# Patient Record
Sex: FEMALE
[De-identification: names, ages, dates, MRNs, and addresses within clinical notes are randomized per-mention and may not be internally consistent; named-entity substitution may affect disease eponyms.]

---

## 2017-04-21 ENCOUNTER — HOSPITAL ENCOUNTER (INPATIENT)
Dept: HOSPITAL 42 - ED | Age: 82
LOS: 6 days | Discharge: NURSING, REHAB OR LONG-TERM CARE | DRG: 309 | End: 2017-04-27
Attending: INTERNAL MEDICINE | Admitting: INTERNAL MEDICINE
Payer: MEDICARE

## 2017-04-21 VITALS — HEART RATE: 64 BPM

## 2017-04-21 VITALS — BODY MASS INDEX: 22.4 KG/M2

## 2017-04-21 DIAGNOSIS — J45.909: ICD-10-CM

## 2017-04-21 DIAGNOSIS — I48.0: Primary | ICD-10-CM

## 2017-04-21 DIAGNOSIS — R19.00: ICD-10-CM

## 2017-04-21 DIAGNOSIS — Z79.01: ICD-10-CM

## 2017-04-21 DIAGNOSIS — Z79.84: ICD-10-CM

## 2017-04-21 DIAGNOSIS — Z91.14: ICD-10-CM

## 2017-04-21 DIAGNOSIS — G30.9: ICD-10-CM

## 2017-04-21 DIAGNOSIS — Z86.73: ICD-10-CM

## 2017-04-21 DIAGNOSIS — Z85.3: ICD-10-CM

## 2017-04-21 DIAGNOSIS — J44.9: ICD-10-CM

## 2017-04-21 DIAGNOSIS — E86.0: ICD-10-CM

## 2017-04-21 DIAGNOSIS — I48.2: ICD-10-CM

## 2017-04-21 DIAGNOSIS — E87.6: ICD-10-CM

## 2017-04-21 DIAGNOSIS — Z95.5: ICD-10-CM

## 2017-04-21 DIAGNOSIS — E11.65: ICD-10-CM

## 2017-04-21 DIAGNOSIS — R44.1: ICD-10-CM

## 2017-04-21 DIAGNOSIS — K59.00: ICD-10-CM

## 2017-04-21 DIAGNOSIS — Z95.1: ICD-10-CM

## 2017-04-21 DIAGNOSIS — J84.9: ICD-10-CM

## 2017-04-21 DIAGNOSIS — I10: ICD-10-CM

## 2017-04-21 DIAGNOSIS — Z90.13: ICD-10-CM

## 2017-04-21 DIAGNOSIS — F02.80: ICD-10-CM

## 2017-04-21 DIAGNOSIS — I25.119: ICD-10-CM

## 2017-04-21 DIAGNOSIS — E78.00: ICD-10-CM

## 2017-04-21 DIAGNOSIS — H91.90: ICD-10-CM

## 2017-04-21 LAB
ADD MANUAL DIFF?: NO
ALBUMIN/GLOB SERPL: 1.2 {RATIO} (ref 1.1–1.8)
ALP SERPL-CCNC: 62 U/L (ref 38–133)
ALT SERPL-CCNC: 40 U/L (ref 7–56)
APPEARANCE UR: CLEAR
APTT BLD: 26.4 SECONDS (ref 23.7–30.8)
AST SERPL-CCNC: 38 U/L (ref 15–39)
BACTERIA #/AREA URNS HPF: (no result) /[HPF]
BASE EXCESS BLDV CALC-SCNC: 1.5 MMOL/L (ref 0–2)
BASOPHILS # BLD AUTO: 0.07 K/MM3 (ref 0–2)
BASOPHILS NFR BLD: 0.6 % (ref 0–3)
BILIRUB SERPL-MCNC: 1.1 MG/DL (ref 0.2–1.3)
BILIRUB UR-MCNC: NEGATIVE MG/DL
BUN SERPL-MCNC: 32 MG/DL (ref 7–21)
CALCIUM SERPL-MCNC: 9.6 MG/DL (ref 8.4–10.5)
CHLORIDE SERPL-SCNC: 103 MMOL/L (ref 98–107)
CO2 SERPL-SCNC: 25 MMOL/L (ref 21–33)
COLOR UR: YELLOW
EOSINOPHIL # BLD: 0.2 10*3/UL (ref 0–0.7)
EOSINOPHIL NFR BLD: 1.3 % (ref 1.5–5)
EPI CELLS #/AREA URNS HPF: (no result) /HPF (ref 0–5)
ERYTHROCYTE [DISTWIDTH] IN BLOOD BY AUTOMATED COUNT: 12.7 % (ref 11.5–14.5)
GLOBULIN SER-MCNC: 3.6 GM/DL
GLUCOSE SERPL-MCNC: 186 MG/DL (ref 70–110)
GLUCOSE UR STRIP-MCNC: 100 MG/DL
GRANULOCYTES # BLD: 9.1 10*3/UL (ref 1.4–6.5)
GRANULOCYTES NFR BLD: 74.3 % (ref 50–68)
HCT VFR BLD CALC: 38.8 % (ref 36–48)
INR PPP: 1 (ref 0.93–1.08)
KETONES UR STRIP-MCNC: 15 MG/DL
LEUKOCYTE ESTERASE UR-ACNC: NEGATIVE LEU/UL
LYMPHOCYTES # BLD: 1.7 10*3/UL (ref 1.2–3.4)
LYMPHOCYTES NFR BLD AUTO: 14 % (ref 22–35)
MAGNESIUM SERPL-MCNC: 1.8 MG/DL (ref 1.7–2.2)
MCH RBC QN AUTO: 31.6 PG (ref 25–35)
MCHC RBC AUTO-ENTMCNC: 35.1 G/DL (ref 31–37)
MCV RBC AUTO: 90 FL (ref 80–105)
MONOCYTES # BLD AUTO: 1.2 10*3/UL (ref 0.1–0.6)
MONOCYTES NFR BLD: 9.8 % (ref 1–6)
PH BLDV: 7.43 [PH] (ref 7.32–7.43)
PH UR STRIP: 6 [PH] (ref 4.7–8)
PHOSPHATE SERPL-MCNC: 3.2 MG/DL (ref 2.5–4.5)
PLATELET # BLD: 242 10^3/UL (ref 120–450)
PMV BLD AUTO: 10 FL (ref 7–11)
POTASSIUM SERPL-SCNC: 3.5 MMOL/L (ref 3.6–5)
PROT SERPL-MCNC: 8 G/DL (ref 5.8–8.3)
PROT UR STRIP-MCNC: 30 MG/DL
RBC # UR STRIP: (no result) /UL
RBC #/AREA URNS HPF: (no result) /HPF (ref 0–2)
SODIUM SERPL-SCNC: 142 MMOL/L (ref 132–148)
SP GR UR STRIP: 1.02 (ref 1–1.03)
T4 FREE SERPL-MCNC: 1.31 NG/DL (ref 0.78–2.19)
TROPONIN I SERPL-MCNC: 0.02 NG/ML
TSH SERPL-ACNC: 1.13 MIU/ML (ref 0.46–4.68)
UROBILINOGEN UR STRIP-ACNC: 0.2 E.U./DL
WBC # BLD AUTO: 12.3 10^3/UL (ref 4.5–11)
WBC #/AREA URNS HPF: (no result) /HPF (ref 0–6)

## 2017-04-21 RX ADMIN — ENOXAPARIN SODIUM SCH MG: 60 INJECTION SUBCUTANEOUS at 23:15

## 2017-04-21 RX ADMIN — DILTIAZEM HYDROCHLORIDE PRN MLS/HR: 100 INJECTION, POWDER, LYOPHILIZED, FOR SOLUTION INTRAVENOUS at 23:33

## 2017-04-21 NOTE — ED PDOC
Arrival/HPI





- General


Chief Complaint: Altered Mental Status


Time Seen by Provider: 04/21/17 19:09





- History of Present Illness


Narrative History of Present Illness (Text): 





04/21/17 19:38


87-year-old female presents the emergency department stating that she is seeing 

things and hearing things that are not there. Patient states that she has had 

these symptoms for the last few weeks. No relieving or exacerbating factors. 

States her hallucinations are not command, and she denies any suicidal or 

homicidal ideations. Patient is alert to name, does not know the location or 

the date.





Past Medical History





- Provider Review


Nursing Documentation Reviewed: Yes





- Infectious Disease


Hx of Infectious Diseases: None





- Tetanus Immunization


Tetanus Immunization: Unknown





- Reproductive


Menopause: Yes





- Cardiac


Hx Cardiac Disorders: Yes


Hx Hypertension: Yes





- Pulmonary


Hx Chronic Obstructive Pulmonary Disease (COPD): Yes





- Neurological


HX Cerebrovascular Accident: Yes





- HEENT


Hx HEENT Disorder: Yes ([place])





- Renal


Hx Renal Disorder: No





- Endocrine/Metabolic


Hx Diabetes Mellitus Type 2: Yes





- Hematological/Oncological


Hx Cancer: Yes (breast)





- Integumentary


Hx Dermatological Disorder: No





- Musculoskeletal/Rheumatological


Hx Falls: Yes





- Gastrointestinal


Hx Gastrointestinal Disorders: Yes (POOR APPETTITE,)





- Genitourinary/Gynecological


Hx Genitourinary Disorders: No


Hx Reproductive Disorders: No





- Psychiatric


Hx Psychophysiologic Disorder: No


Hx Substance Use: No





- Surgical History


Hx Open Heart Surgery: Yes


Other/Comment: Radical mastectomy.





- Anesthesia


Hx Anesthesia: Yes


Hx Anesthesia Reactions: No


Hx Malignant Hyperthermia: No





- Suicidal Assessment


Feels Threatened In Home Enviroment: No





Family/Social History





- Physician Review


Nursing Documentation Reviewed: Yes


Family/Social History: Unknown Family HX


Smoking Status: Never Smoked


Hx Alcohol Use: No


Hx Substance Use: No


Hx Substance Use Treatment: No





Allergies/Home Meds


Allergies/Adverse Reactions: 


Allergies





FISH Allergy (Verified 12/20/16 16:35)


 NAUSEA


Sulfa (Sulfonamide Antibiotics) Allergy (Verified 12/20/16 16:35)


 HEADACHE











Review of Systems





- Review of Systems


Systems not reviewed;Unavailable: Altered Mental Status





Physical Exam





- Physical Exam


Narrative Physical Exam (Text): 





Physical exam





Patient appears age appropriate in no distress, speaking full sentences without 

difficulty





- Systems Exam


Head: Present: Atraumatic, Normocephalic


Pupils: Present: PERRL


Extroacular Muscles: Present: EOMI


Conjunctiva: Present: Normal


Mouth: Present: Dry Mucous Membranes


Neck: Present: Normal Range of Motion.  No: MIDLINE TENDERNESS, Paraspinal 

Tenderness


Respiratory/Chest: Present: Clear to Auscultation, Good Air Exchange.  No: 

Respiratory Distress, Accessory Muscle Use, Tachypneic


Cardiovascular: Present: Irregular, Peripheal Pulses Present. 


Abdomen: Present: Normal Bowel Sounds.  No: Tenderness, Distention, Peritoneal 

Signs, Rebound, Guarding


Back: Present: Normal Inspection.  No: Midline Tenderness, Paraspinal Tenderness


Upper Extremity: Present: Normal Inspection.  No: Cyanosis, Edema


Lower Extremity: Present: Normal Inspection.  No: Edema


Neurological: Present: GCS=15, Speech Normal, cranial nerves II through XII 

fully intact with no cerebellar abnormality, neurosensory fully intact. No 

focal neurological deficits.


Skin: Present: Warm, Dry, Normal Color.  No: Rashes


Lymphatic: Present: OX3, NI, NC


Psychiatric: Present: Alert, not agitated





Vital Signs Reviewed: Yes


Vital Signs











  Temp Pulse Resp BP Pulse Ox


 


 04/21/17 21:45   122 H  20  123/91 H  97


 


 04/21/17 21:29   142 H   151/91 H  92 L


 


 04/21/17 21:24   120 H  23  136/114 H  93 L


 


 04/21/17 21:09   119 H  19  150/85  93 L


 


 04/21/17 20:57   155 H   160/107 H 


 


 04/21/17 20:55   169 H   160/107 H 


 


 04/21/17 20:39   161 H  24  136/105 H  91 L


 


 04/21/17 20:24   151 H   123/74 


 


 04/21/17 20:09   130 H   143/88 


 


 04/21/17 20:02   130 H   139/63 


 


 04/21/17 19:57   172 H   203/127 H 


 


 04/21/17 19:55   166 H   197/117 H 


 


 04/21/17 19:04  98.3 F  115 H  20  186/109 H  95











Temperature: Afebrile


Blood Pressure: Hypertensive


Pulse: Tachycardic


Respiratory Rate: Normal


Appearance: Positive for: Non-Toxic, Comfortable


Pain Distress: None


Mental Status: Positive for: Confused.  No: Agitated, Lethargic





Medical Decision Making


ED Course and Treatment: 


04/21/17 19:13


Previous records reviewed, patient was hospitalized 12/20/16 with altered 

mental status. Patient has a history of atrial fibrillation on Coumadin, 

hypertension, diabetes, breast cancer, dementia.





04/21/17 19:40


Elderly female in the emergency department with hallucinations. No focal 

neurological deficits on examination. Patient is alert to name only.





Labs, imaging ordered. 





Differential diagnoses includes but not limited to: Worsening dementia versus 

infection versus UTI versus pneumonia





04/21/17 20:31


CXR shows vasc. congestion with L. lobar infiltrate. Cardiomegaly. Interpreted 

by me. 





04/21/17 20:40


dw Dr. Lakhani, recommends lopressor first, and states will send resident to 

evaluate





04/21/17 21:37


accepted by Dr. Lakhani to the MICU


Dr. Muller paged





04/21/17 22:03


dw Dr. Muller, accepted pt to her service





- Critical Care


Critical Care Minutes: 30 minutes


Narrative Critical Care (Text): 





04/21/17 20:00


Patient in rapid A. fib, also hypertensive, Cardizem IV push ordered





- Lab Interpretations


Lab Results: 








 04/21/17 19:30 





 04/21/17 19:30 





 Lab Results





04/21/17 21:02: Urine Color Yellow, Urine Appearance Clear, Urine pH 6.0, Ur 

Specific Gravity 1.020, Urine Protein 30 H, Urine Glucose (UA) 100 H, Urine 

Ketones 15 H, Urine Blood Trace-intact H, Urine Nitrate Negative, Urine 

Bilirubin Negative, Urine Urobilinogen 0.2, Ur Leukocyte Esterase Negative, 

Urine RBC 0 - 2, Urine WBC 0 - 2, Ur Epithelial Cells 0 - 2, Urine Bacteria Few


04/21/17 19:30: WBC 12.3 H D, RBC 4.31, Hgb 13.6, Hct 38.8, MCV 90.0, MCH 31.6, 

MCHC 35.1, RDW 12.7, Plt Count 242, MPV 10.0, Gran % 74.3 H, Lymph % (Auto) 

14.0 L, Mono % (Auto) 9.8 H, Eos % (Auto) 1.3 L, Baso % (Auto) 0.6, Gran # 9.10 

H, Lymph # 1.7, Mono # 1.2 H, Eos # 0.2, Baso # 0.07, PT 10.8, INR 1.00, APTT 

26.4, pO2 115 H, VBG pH 7.43, VBG pCO2 39.0 L, VBG HCO3 25.9, VBG Total CO2 27.1

, VBG O2 Sat (Calc) 99.5 H, VBG Base Excess 1.5, VBG Potassium 5.3 H, Glucose 

196 H, Lactate 1.3, FiO2 21.0, Sodium 137.0, Potassium 3.5 L, Chloride 107.0, 

Carbon Dioxide 25, Anion Gap 18, BUN 32 H, Creatinine 0.6, Est GFR (African Amer

) > 60, Est GFR (Non-Af Amer) > 60, Random Glucose 186 H, Calcium 9.6, 

Phosphorus 3.2, Magnesium 1.8, Total Bilirubin 1.1, AST 38, ALT 40, Alkaline 

Phosphatase 62, Lactate Dehydrogenase 789 H, Total Creatine Kinase 176, 

Troponin I 0.02  D, NT-Pro-B Natriuret Pep 997 H, Total Protein 8.0, Albumin 4.3

, Globulin 3.6, Albumin/Globulin Ratio 1.2, Free T4 1.31, TSH 3rd Generation 

1.13, Venous Blood Potassium 5.3 H











- RAD Interpretation


Radiology Orders: 








04/21/17 19:28


HEAD W/O CONTRAST [CT] Stat 


CHEST PORTABLE [RAD] Stat 














- Medication Orders


Current Medication Orders: 








Enoxaparin Sodium (Lovenox)  60 mg SC Q12H JOSHUA


   PRN Reason: Protocol


   Last Admin: 04/21/17 23:15  Dose: 60 MG





Subcutaneous Administrations


 Document     04/21/17 23:15  YP  (Rec: 04/21/17 23:15  YP  [id number])


     Injection Site


      MAR Injection Site                         Right Abdomen


     Charges for Administration


      # of Subcutaneous Administrations          1





diltiaZEM IVPB 100mg in NS (Cardizem 100mg In Ns)  100 mls @ 5 mls/hr IV .Q20H 

PRN; Protocol; 5 MG/HR


   PRN Reason: TITRATE PER MD ORDER


   Last Admin: 04/21/17 23:33  Dose: 5 MLS/HR





Titration Intervention


 Document     04/21/17 23:33  MKN  (Rec: 04/21/17 23:34  KATHY  [id number])


     Titration Intake


      Container Volume                           100


     Titration Dosing


      Titration Dose                             5


      IV Rate                                    5


      Intake/Decrease                            Start


eMAR Start Stop


 Document     04/21/17 23:33  MKN  (Rec: 04/21/17 23:34  [facility]  [id number])


     Intravenous Solution


      Start Date                                 04/21/17


      Start Time                                 23:34





Sodium Chloride (Sodium Chloride 0.9%)  1,000 mls @ 100 mls/hr IV .Q10H JOSHUA


   Last Admin: 04/21/17 23:16  Dose: 100 MLS/HR





eMAR Start Stop


 Document     04/21/17 23:16  YP  (Rec: 04/21/17 23:16  YP  [id number])


     Intravenous Solution


      Start Date                                 04/21/17


      Start Time                                 23:16





Pantoprazole Sodium (Protonix Ec Tab)  40 mg PO 0630 JOSHUA


   Last Admin: 04/22/17 06:41  Dose: 40 MG





Warfarin Sodium (Coumadin)  3 mg PO 1800 JOSHUA


   PRN Reason: Protocol





Discontinued Medications





Diltiazem HCl (Cardizem) Confirm Administered Dose 25 mg .ROUTE .STK-MED ONE


   Stop: 04/21/17 19:57


   Last Admin: 04/21/17 20:00  Dose:  





Diltiazem HCl (Cardizem)  20 mg IVP STAT STA


   Stop: 04/21/17 20:00


   Last Admin: 04/21/17 19:57  Dose: 20 MG





MAR Pulse and Blood Pressure


 Document     04/21/17 19:57  YP  (Rec: 04/21/17 20:01  YP  [id number])


     Pulse


      Pulse Rate (60-90)                         172


     Blood Pressure


      Blood Pressure (100//90)             203/127


IVP Administration


 Document     04/21/17 19:57  YP  (Rec: 04/21/17 20:01  YP  [id number])


     Charges for Administration


      # of IVP Administrations                   1





Furosemide (Lasix)  40 mg IVP STAT STA


   Stop: 04/21/17 20:32


   Last Admin: 04/21/17 20:57  Dose: 40 MG





MAR Blood Pressure


 Document     04/21/17 20:57  YP  (Rec: 04/21/17 20:57  YP  [id number])


     Blood Pressure


      Blood Pressure (100//90)             160/107


IVP Administration


 Document     04/21/17 20:57  YP  (Rec: 04/21/17 20:57  YP  [id number])


     Charges for Administration


      # of IVP Administrations                   1





Magnesium Sulfate/Dextrose (Magnesium Sulfate 1 Gm/100 Ml D5w)  100 mls @ 100 

mls/hr IVPB ONCE ONE


   Stop: 04/21/17 21:27


   Last Admin: 04/21/17 20:58  Dose: 100 MLS/HR





eMAR Start Stop


 Document     04/21/17 20:58  YP  (Rec: 04/21/17 20:58  YP  [id number])


     Intravenous Solution


      Start Date                                 04/21/17


      Start Time                                 20:58


      End Date                                   04/21/17


      End time                                   21:58


      Total Infusion Time                        60





Azithromycin (Zithromax 500mg In Ns)  250 mls @ 167 mls/hr IVPB STAT STA


   PRN Reason: Protocol


   Stop: 04/21/17 22:00


   Last Admin: 04/21/17 21:39  Dose: 167 MLS/HR





eMAR Start Stop


 Document     04/21/17 21:39  YP  (Rec: 04/21/17 21:39  YP  [id number])


     Intravenous Solution


      Start Date                                 04/21/17


      Start Time                                 21:39


      End Date                                   04/21/17


      End time                                   23:09


      Total Infusion Time                        90





Ceftriaxone Sodium (Rocephin 1 Gram Ivpb)  100 mls @ 200 mls/hr IV STAT STA


   PRN Reason: Protocol


   Stop: 04/21/17 21:00


   Last Admin: 04/21/17 20:57  Dose: 200 MLS/HR





eMAR Start Stop


 Document     04/21/17 20:57  YP  (Rec: 04/21/17 20:57  YP  [id number])


     Intravenous Solution


      Start Date                                 04/21/17


      Start Time                                 20:57


      End Date                                   04/21/17


      End time                                   21:27


      Total Infusion Time                        30





Potassium Chloride (Potassium Chloride 20 Meq/100 Ml)  100 mls @ 50 mls/hr IVPB 

ONCE ONE


   Stop: 04/21/17 22:42


   Last Admin: 04/21/17 20:57  Dose: 50 MLS/HR





eMAR Start Stop


 Document     04/21/17 20:57  YP  (Rec: 04/21/17 20:58  YP  [id number])


     Intravenous Solution


      Start Date                                 04/21/17


      Start Time                                 20:57


      End Date                                   04/21/17


      End time                                   22:57


      Total Infusion Time                        120





Metoprolol Tartrate (Lopressor)  5 mg IVP STAT STA


   Stop: 04/21/17 20:41


   Last Admin: 04/21/17 20:57  Dose: 5 MG





MAR Pulse and Blood Pressure


 Document     04/21/17 20:57  YP  (Rec: 04/21/17 20:57  YP  [id number])


     Pulse


      Pulse Rate (60-90)                         155


     Blood Pressure


      Blood Pressure (100//90)             160/107


IVP Administration


 Document     04/21/17 20:57  YP  (Rec: 04/21/17 20:57  YP  [id number])


     Charges for Administration


      # of IVP Administrations                   1














Disposition/Present on Arrival





- Present on Arrival


Any Indicators Present on Arrival: No


History of DVT/PE: No


History of Uncontrolled Diabetes: Yes


Urinary Catheter: No


History of Decub. Ulcer: No


History Surgical Site Infection Following: None





- Disposition


Have Diagnosis and Disposition been Completed?: Yes


Diagnosis: 


 Altered mental status


Disposition: HOSPITALIZED


Disposition Time: 21:38


Patient Plan: Admission


Patient Problems: 


 Current Active Problems











Problem Status Diagnosed


 


Altered mental status Acute 











Condition: FAIR

## 2017-04-21 NOTE — CP.PCM.CON
<Rudolph Wadsworth - Last Filed: 17 22:44>





History of Present Illness





- History of Present Illness


History of Present Illness: 


Rudolph Wadswroth, PGY-1 Consult Note for ICU Service





87 F with PMHx of HTN, NIDDM, COPD, CAD s/p CABG, Afib on coumadin, breast 

cancer s/p mastectomy and chemotherapy and dementia [place] [facility] [place] 

complaints of AMS. Pt was brought [place] [facility] [place] EMS, who reported that she was 

hallucinating in her apartment. As per EMS, pt was experiencing auditory and 

visual hallucinations, as she was seeing her dead dog and hearing laughter. Pt 

denied homicidal or suicidal ideation. She states that she is currently feeling 

weak, and has insight into the fact that she is confused. She is alert, 

orientated to person and more recently to place, is able to follows commands, 

however is easily distracted. Pt stated that she feels she is on a tv show, 

everyone is looking at her and laughing. ROS unable to be obtained on account 

of pts AMS. 





PMHx: HTN, NIDDM, COPD, CAD s/p CABG, Afib on coumadin, breast cancer and 

dementia


PSHx: Double mastectomy, CABG, "valve replacement"


SHx: Denied tobacco/etoh/illicit drug use. Live in [facility]


Famhx: Noncontributory


Meds: metformin, coumadin, sotolol, seroquel, losartan, glipizide, lipitor


Allergies: Fish, Sulfa





Review of Systems





- Review of Systems


Systems not reviewed;Unavailable: Altered Mental Status





Past Patient History





- Infectious Disease


Hx of Infectious Diseases: None





- Tetanus Immunizations


Tetanus Immunization: Unknown





- Past Social History


Smoking Status: Never Smoked





- CARDIAC


Hx Cardiac Disorders: Yes


Hx Hypertension: Yes





- PULMONARY


Hx Chronic Obstructive Pulmonary Disease (COPD): Yes





- NEUROLOGICAL


HX Cerebrovascular Accident: Yes





- HEENT


Hx HEENT Problems: Yes ([place])





- RENAL


Hx Chronic Kidney Disease: No





- ENDOCRINE/METABOLIC


Hx Diabetes Mellitus Type 2: Yes





- HEMATOLOGICAL/ONCOLOGICAL


Hx Cancer: Yes (breast)





- INTEGUMENTARY


Hx Dermatological Problems: No





- MUSCULOSKELETAL/RHEUMATOLOGICAL


Hx Falls: Yes





- GASTROINTESTINAL


Hx Gastrointestinal Disorders: Yes (POOR APPETTITE,)





- GENITOURINARY/GYNECOLOGICAL


Hx Genitourinary Disorders: No


Hx Reproductive Disorders: No





- PSYCHIATRIC


Hx Psychophysiologic Disorder: No


Hx Substance Use: No





- SURGICAL HISTORY


Hx Open Heart Surgery: Yes


Other/Comment: Radical mastectomy.





- ANESTHESIA


Hx Anesthesia: Yes


Hx Anesthesia Reactions: No


Hx Malignant Hyperthermia: No





Meds


Allergies/Adverse Reactions: 


 Allergies











Allergy/AdvReac Type Severity Reaction Status Date / Time


 


FISH Allergy  NAUSEA Verified 16 16:35


 


Sulfa (Sulfonamide Allergy  HEADACHE Verified 16 16:35





Antibiotics)     














- Medications


Medications: 


 Current Medications





Enoxaparin Sodium (Lovenox)  60 mg SC Q12H JOSHUA


   PRN Reason: Protocol


Potassium Chloride (Potassium Chloride 20 Meq/100 Ml)  100 mls @ 50 mls/hr IVPB 

ONCE ONE


   Stop: 17 22:42


   Last Admin: 17 20:57 Dose:  50 mls/hr


diltiaZEM IVPB 100mg in NS (Cardizem 100mg In Ns)  100 mls @ 5 mls/hr IV .Q20H 

PRN; Protocol; 5 MG/HR


   PRN Reason: TITRATE PER MD ORDER


Sodium Chloride (Sodium Chloride 0.9%)  1,000 mls @ 100 mls/hr IV .Q10H JOSHUA


Pantoprazole Sodium (Protonix Ec Tab)  40 mg PO 0630 JOSHUA


Warfarin Sodium (Coumadin)  3 mg PO 1800 JOSHUA


   PRN Reason: Protocol











Physical Exam





- Constitutional


Appears: No Acute Distress, Confused





- Head Exam


Head Exam: ATRAUMATIC, NORMAL INSPECTION, NORMOCEPHALIC





- Eye Exam


Eye Exam: EOMI, Normal appearance, PERRL


Pupil Exam: NORMAL ACCOMODATION, PERRL





- ENT Exam


ENT Exam: Mucous Membranes Dry





- Neck Exam


Neck exam: Positive for: Normal Inspection





- Respiratory Exam


Respiratory Exam: Clear to Auscultation Bilateral, NORMAL BREATHING PATTERN





- Cardiovascular Exam


Cardiovascular Exam: Tachycardia, Irregular Rhythm, +S1, +S2





- GI/Abdominal Exam


GI & Abdominal Exam: Distended (mildly), Normal Bowel Sounds, Soft.  absent: 

Tenderness





- Extremities Exam


Extremities exam: Positive for: normal inspection, pedal pulses present.  

Negative for: pedal edema, tenderness





- Back Exam


Back exam: NORMAL INSPECTION





- Neurological Exam


Neurological exam: Alert, Altered, CN II-XII Intact





- Psychiatric Exam


Psychiatric exam: Normal Affect, Normal Mood





- Skin


Skin Exam: Dry, Intact, Normal Color, Warm


Additional comments: 


chest scars well healed





Results





- Vital Signs


Recent Vital Signs: 


 Last Vital Signs











Temp  98.3 F   17 19:04


 


Pulse  142 H  17 21:29


 


Resp  23   17 21:24


 


BP  151/91 H  17 21:29


 


Pulse Ox  92 L  17 21:29














- Labs


Result Diagrams: 


 17 19:30





 17 19:30





Assessment & Plan





- Assessment and Plan (Free Text)


Assessment: 


87 F with PMHx of HTN, NIDDM, COPD, CAD s/p CABG, Afib on coumadin, breast 

cancer and dementia [place] [facility] [place] with complaints of AMS secondary to an 

unclear etiology, admitted to ICU on account of HD instability. 





Neuro:


- AAOx2, person and place, speaking in full sentences and following commands


- Hx of isolated Mood disorder and psychosis


- Experiencing Auditory and Visual hallucinations


- Neurocheck q4h to monitor for improvement of altered status


- CTH pending





Pulm:


- Hx of COPD


-  Sat 95% on RA


- CXR: Questionable LLL PNA; pt has residual breast tissue s/p mastectomy


- Administered Empiric Abx: Rocephin and Azithromax, will hold for now


- 02 NC PRN to maintain 02 sat >88%





CVS:


- Pt is dehydrated, as demonstrated by labwork, will begin IVF, NS@100ml/hr


- Hx of Afib on coumadin


- currently in Afib with RVR, will begin Cardizem drip and titrate to targer HR 

<130bpm


- Target MAP >70 





GI:


- Mildly distended abdomen


- F/u CT Abd/pelv 


- Protonix GI ppx


- NPO





Renal:


- Monitor I&Os


- Monitor renal fcn


- Hypokalemic, supplemented


- will continue to monitor electrolytes and supplement as needed





ID:


- Leukocytosis, empiric abx for questionable pna


- Fu Bcx and Ua


- Lactate 1.3


- Tylenol prn for fever





Heme:


- Leukocytosis with WBC of 12.3, Azithromycin and Rocephin empirically 

administered


- Subtherapeutic INR: 1.00, will give therapeutic Lovenox 60mg q12 in addition 

to home coumadin 3mg 1800


- Fu INR





Seen reviewed and discussed with attending

















<Pool Lakhani Q - Last Filed: 17 04:37>





Meds





- Medications


Medications: 


 Current Medications





Enoxaparin Sodium (Lovenox)  60 mg SC Q12H JOSHUA


   PRN Reason: Protocol


   Last Admin: 17 23:15 Dose:  60 mg


diltiaZEM IVPB 100mg in NS (Cardizem 100mg In Ns)  100 mls @ 5 mls/hr IV .Q20H 

PRN; Protocol; 5 MG/HR


   PRN Reason: TITRATE PER MD ORDER


   Last Admin: 17 23:33 Dose:  5 mls/hr


Sodium Chloride (Sodium Chloride 0.9%)  1,000 mls @ 100 mls/hr IV .Q10H JOSHUA


   Last Admin: 17 23:16 Dose:  100 mls/hr


Pantoprazole Sodium (Protonix Ec Tab)  40 mg PO 0630 JOSHUA


Warfarin Sodium (Coumadin)  3 mg PO 1800 JOSHUA


   PRN Reason: Protocol











Results





- Vital Signs


Recent Vital Signs: 


 Last Vital Signs











Temp  97.1 F L  17 23:46


 


Pulse  97 H  17 00:00


 


Resp  39 H  17 00:00


 


BP  133/76   17 00:00


 


Pulse Ox  98   17 00:00














- Labs


Result Diagrams: 


 17 19:30





 17 19:30





Attending/Attestation





- Attestation


I have personally seen and examined this patient.: Yes


I have fully participated in the care of the patient.: Yes


I have reviewed all pertinent clinical information: Yes


Notes (Text): 


17 04:33


I agree with the above mentioned note and exam by Dr. Wadsworth with the addition/

exception of the followin y/o female with a PMHx as listed above was brought in to the ED by EMS due 

to an apparent "altered mental status."  There was not much information 

available in the ED regarding what prompted the phone call or who called and 

why regarding the patient's change in mental condition; attempts made to reach 

the phone numbers in the chart without success.  The patient herself happens to 

be AAOx3 however does speak as if she is having visual hallucinations seeing 

people and objects that are not present.  She also had Afib with RVR in the ED 

without a clear cut cause as to what caused this.  She was admitted to the ICU 

given that a bolus dose of cardizem did not alleviate her tachycardia; she was 

placed on a drip and rate controlled. Head CT was done which appears to show no 

acute process at this time; CT of the Abd/pelvis showed a carcnoid mass in the 

stomach.  We will have a better picture as to what the patient's baseline is 

later today when she is seen and examined by her PMD.





Case discussed at length with Dr. Thacker in the ED


labs and images reviewed personally





Total time of care: 40 minutes

## 2017-04-22 LAB
ADD MANUAL DIFF?: NO
ALBUMIN/GLOB SERPL: 1.1 {RATIO} (ref 1.1–1.8)
ALP SERPL-CCNC: 65 U/L (ref 38–133)
ALT SERPL-CCNC: 35 U/L (ref 7–56)
AST SERPL-CCNC: 37 U/L (ref 15–39)
BASOPHILS # BLD AUTO: 0.06 K/MM3 (ref 0–2)
BASOPHILS NFR BLD: 0.5 % (ref 0–3)
BILIRUB SERPL-MCNC: 1.3 MG/DL (ref 0.2–1.3)
BUN SERPL-MCNC: 23 MG/DL (ref 7–21)
CALCIUM SERPL-MCNC: 8.6 MG/DL (ref 8.4–10.5)
CHLORIDE SERPL-SCNC: 102 MMOL/L (ref 98–107)
CO2 SERPL-SCNC: 26 MMOL/L (ref 21–33)
EOSINOPHIL # BLD: 0.1 10*3/UL (ref 0–0.7)
EOSINOPHIL NFR BLD: 0.8 % (ref 1.5–5)
ERYTHROCYTE [DISTWIDTH] IN BLOOD BY AUTOMATED COUNT: 12.9 % (ref 11.5–14.5)
GLOBULIN SER-MCNC: 3.7 GM/DL
GLUCOSE SERPL-MCNC: 147 MG/DL (ref 70–110)
GRANULOCYTES # BLD: 8.13 10*3/UL (ref 1.4–6.5)
GRANULOCYTES NFR BLD: 71.9 % (ref 50–68)
HCT VFR BLD CALC: 40.1 % (ref 36–48)
INR PPP: 1.07 (ref 0.93–1.08)
LABORATORY COMMENT REPORT: 3.4 PG/ML (ref 2.77–5.27)
LYMPHOCYTES # BLD: 1.8 10*3/UL (ref 1.2–3.4)
LYMPHOCYTES NFR BLD AUTO: 16.2 % (ref 22–35)
MAGNESIUM SERPL-MCNC: 1.8 MG/DL (ref 1.7–2.2)
MCH RBC QN AUTO: 30.8 PG (ref 25–35)
MCHC RBC AUTO-ENTMCNC: 34.4 G/DL (ref 31–37)
MCV RBC AUTO: 89.5 FL (ref 80–105)
MONOCYTES # BLD AUTO: 1.2 10*3/UL (ref 0.1–0.6)
MONOCYTES NFR BLD: 10.6 % (ref 1–6)
PHOSPHATE SERPL-MCNC: 3.5 MG/DL (ref 2.5–4.5)
PLATELET # BLD: 256 10^3/UL (ref 120–450)
PMV BLD AUTO: 10.1 FL (ref 7–11)
POTASSIUM SERPL-SCNC: 3.7 MMOL/L (ref 3.6–5)
PROT SERPL-MCNC: 8 G/DL (ref 5.8–8.3)
SODIUM SERPL-SCNC: 142 MMOL/L (ref 132–148)
WBC # BLD AUTO: 11.3 10^3/UL (ref 4.5–11)

## 2017-04-22 RX ADMIN — DILTIAZEM HYDROCHLORIDE PRN MLS/HR: 100 INJECTION, POWDER, LYOPHILIZED, FOR SOLUTION INTRAVENOUS at 17:16

## 2017-04-22 RX ADMIN — ENOXAPARIN SODIUM SCH MG: 60 INJECTION SUBCUTANEOUS at 10:57

## 2017-04-22 RX ADMIN — PANTOPRAZOLE SODIUM SCH MG: 40 TABLET, DELAYED RELEASE ORAL at 06:41

## 2017-04-22 RX ADMIN — ENOXAPARIN SODIUM SCH MG: 60 INJECTION SUBCUTANEOUS at 22:15

## 2017-04-22 NOTE — CON
DATE: 04/22/2017



PULMONARY CRITICAL CARE CONSULTATION 



I am also covering Dr. Muller for today.



REASON FOR CONSULT:  Change of mental status, chronic lung disease.



HISTORY OF PRESENT ILLNESS:  This is an 87-year-old female with known history of hypertension, diabet
es, paroxysmal atrial fibrillation, dementia, chronic lung disease, found to be lethargic.  In the ER
 had AFib with rapid ventricular response.  Was admitted to intensive care unit.  Her atrial fibrilla
tion converted to sinus rhythm spontaneously.  She was started on antibiotics, fluids, and feels bett
er this morning, more awake.  No significant cough or sputum production.  No chest pain.  No hematuri
a, no diarrhea.



PAST MEDICAL HISTORY:  Chronic lung disease, hypertension, diabetes, coronary artery disease, paroxys
mal atrial fibrillation, history of breast cancer requiring mastectomy, Alzheimer-type dementia.



ALLERGIES:  SULFA.



SOCIAL HISTORY:  No history of smoking or alcohol use.



FAMILY HISTORY:  No significant cardiopulmonary disease reported.



MEDICATIONS:  She is on presently diltiazem IV drip, Coumadin 3 mg will be given tonight, Cozaar 100 
mg daily, metoprolol tartrate 25 mg twice a day, Lovenox 60 mg subcutaneous twice a day, Protonix 40 
mg daily, IV fluid normal saline 100 mL per hour.



PHYSICAL EXAMINATION:  

HEENT:  Moist mucous membranes.  Crowded.

NECK:  Supple.  No JVD.

LUNGS:  Have a few scattered rhonchi.

HEART:  S1 and S2.

ABDOMEN:  Soft, nontender.  No organomegaly.

EXTREMITIES:  There is no edema.

NEUROLOGIC:  Awake, alert, follows simple commands, but confused.



LABORATORY DATA:  Shows hemoglobin 13.8, hematocrit 40.1, WBC 11.3, platelet is 256.  INR 1.07.  Had 
a VBG done yesterday, shows pH 7.43, pCO2 39, O2 115.  Sodium 142, potassium 3.7, chloride 102, bicar
bonate 26, BUN 23, creatinine 0.6, glucose 147, calcium 8.6, phosphorus 3.5, magnesium 1.8, AST 37, A
LT 35, alk phos is 65.  ProBNP 2180.  Albumin is 4.2.  TSH is 1.13.  Urinalysis shows WBCs 0-2, RBCs 
0-2.  Had a CAT scan of the abdomen and pelvis done on admission, which shows a 4 cm calcified mass i
n the central mesenteric root, which is nonspecific.  Carcinoid tumor cannot be excluded.  CAT scan o
f the head was done, which shows motion artifact, but no sign of a bleed or stroke observed.  Chest x
-ray done on admission shows chronic interstitial changes.



IMPRESSION AND PLAN:  Atrial fibrillation with rapid ventricular response with change of mental statu
s, chronic lung disease, interstitial lung disease, abdominal mass, Alzheimer-type dementia, hyperten
mireya.  I agree with the present management.  Continue anticoagulation.  Supplement oxygen.  Continue 
IV Cardizem for now.  We will get gastroenterology consult to further evaluate the abdominal mass.  F
ollow up ProBNP, procalcitonin.  Continue gastric prophylaxis, anticoagulation.





__________________________________________

Claire Barcenas MD







cc:



DD: 04/22/2017 16:05:48  [id number]

TT: 04/22/2017 17:05:40

Confirmation # [id number]

Dictation # [id number]

aniya

## 2017-04-22 NOTE — CON
DATE: 04/22/2017



HISTORY OF PRESENT ILLNESS:  This is an 87-year-old lady with history of 
dementia, hypertension, diabetes mellitus, and paroxysmal atrial fibrillation, 
who presented this time with altered mental status/lethargy.  She had mild 
leukocytosis and concern for community-acquired pneumonia was raised by ER 
physician.  The patient was started on ceftriaxone and azithromycin.  IV fluids 
were initiated.  The patient was also noted to have uncontrolled afib with RVR, 
thus she was put on Cardizem drip with subsequent spontaneous conversion to 
sinus rhythm.  The patient was admitted to ICU with prelim diagnosis of severe 
sepsis, dehydration, and questionable ability to protect airways.  No nausea, 
no vomiting, no diarrhea, no constipation.



PAST MEDICAL HISTORY:  Diabetes, hypertension, COPD, coronary artery disease, 
paroxysmal afib, history of breast cancer with mastectomy, dementia.



ALLERGIES:  SULFA DRUGS FISH.



FAMILY HISTORY:  Noncontributory.



SOCIAL HISTORY:  No alcohol or illicit drug abuse.  No tobacco smoking.



REVIEW OF SYSTEMS:  Revealed 12 organ system other than mentioned in history of 
present illness is negative.



MEDICATIONS AT HOME:  Metformin, warfarin, sotalol, Seroquel, Cozaar, glipizide
, bacitracin, Lipitor.



PHYSICAL EXAMINATION:

VITAL SIGNS:  Heart rate 88, blood pressure is 191/119 (patient is on Cozaar, 
which was held yesterday and will be restarted today), Cardizem drip at 5 mg 
per hour, respiratory rate 20, oxygen saturation 99% on nasal cannula 2 liters 
per minute.

HEAD AND NECK:  Atraumatic.

LUNGS:  Clear to auscultation bilaterally.

HEART:  Regular rate and rhythm.  S1, S2 normal.

ABDOMEN:  Soft, nontender, nondistended.

MUSCULOSKELETAL:  No C/C/E.

NEUROLOGIC:  The patient moves all extremities spontaneously.

SKIN:  Moist.

PSYCHIATRIC:  The patient is alert, confused, but not lethargic and clearly 
able to protect her airways.



LABORATORY DATA:  WBC 11.3, hemoglobin 13.8, platelet count 256.  Sodium 142, 
potassium 3.7, chloride 102, carbon dioxide 26, BUN 23, creatinine 0.6, glucose 
147.  Troponin 0.02.  INR 1.07.  Lactic acid 1.3.  Urine showed no nitrites and 
no leukocyte esterase. BUN 23, creatinine 0.6.



MEDICATIONS IN THE HOSPITAL:  Cardizem drip 5 per hour, warfarin, Cozaar, 
metoprolol 25 mg p.o. b.i.d., Lovenox 60 mg subQ q. 12, Protonix 40 mg p.o., 
ceftriaxone, azithromycin given yesterday.



Chest x-ray:  No active pulmonary disease.



ASSESSMENT AND PLAN:  This is an 87-year-old lady who presented with some 
degree of dehydration that led to atrial fibrillation with rapid ventricular 
rate and some altered mental status.  The patient was fluid resuscitated and 
started on Cardizem drip with subsequent spontaneous conversion to sinus 
rhythm.  There are no signs of potential sources of infection.  Urinalysis is 
negative for urine nitrites and leukocyte esterase, chest x-ray did not show 
any distinct infiltrate.  The patient is afebrile and has only minimal 
leukocytosis at 11.3, which likely reactive.  The patient is comfortable, able 
to protect her airways, slightly hypertensive, but otherwise hemodynamically 
relatively stable.  Her oral medication will be restarted.  Okay to downgrade 
to telemetry.  We will continue to target euvolemia, euglycemia, normothermia 
and oxygen saturation more than 90%.  We will continue IV fluid until oral 
hydration and nutrition deemed to be adequate.  We will continue with GI 
prophylaxis.  The patient on therapeutic anticoagulation to prevent stroke, 
proximal atrial fibrillation. Cardiology consult is pending



ccm time 40 min





__________________________________________

Chinmay Calixto MD







cc:   



DD: 04/22/2017 08:00:33  [id number]

TT: 04/22/2017 08:55:08

Confirmation # [id number]

Dictation # [id number]

jn

MTDD

## 2017-04-22 NOTE — CT
PROCEDURE:  CT HEAD WITHOUT CONTRAST.



HISTORY:

HA x2 weeks



COMPARISON:

None available. 



TECHNIQUE:

Axial computed tomography images were obtained through the head/brain 

without intravenous contrast.  



Radiation dose:



Total exam DLP =  mGy-cm.



This CT exam was performed using one or more of the following dose 

reduction techniques: Automated exposure control, adjustment of the 

mA and/or kV according to patient size, and/or use of iterative 

reconstruction technique.



FINDINGS:



HEMORRHAGE:

No intracranial hemorrhage. 



BRAIN:

No mass effect or edema.  No atrophy or chronic microvascular 

ischemic changes.



VENTRICLES:

Unremarkable. No hydrocephalus. 



CALVARIUM:

Unremarkable.



PARANASAL SINUSES:

Unremarkable as visualized. No significant inflammatory changes.



MASTOID AIR CELLS:

Unremarkable as visualized. No inflammatory changes.



OTHER FINDINGS:

None.



IMPRESSION:

Limited by motion artifact. No acute intracranial hemorrhage.

## 2017-04-22 NOTE — CT
PROCEDURE:  CT Abdomen and Pelvis without intravenous contrast



HISTORY:

distended



COMPARISON:

None.



TECHNIQUE:

Technique. 



Contrast Dose: 



Radiation dose:



Total exam DLP =  mGy-cm.



This CT exam was performed using one or more of the following dose 

reduction techniques: Automated exposure control, adjustment of the 

mA and/or kV according to patient size, and/or use of iterative 

reconstruction technique.



FINDINGS:



LOWER THORAX:

Unremarkable. 



LIVER:

Unremarkable. No gross lesion or ductal dilatation.  



GALLBLADDER AND BILE DUCTS:

Gallstones. 



PANCREAS:

Unremarkable. No gross lesion or ductal dilatation.



SPLEEN:

Unremarkable. 



ADRENALS:

Unremarkable. No mass. 



KIDNEYS AND URETERS:

Unremarkable. No hydronephrosis. No solid mass. 



VASCULATURE:

Unremarkable. No aortic aneurysm. 



BOWEL:

Colonic diverticulosis. 



APPENDIX:

Unremarkable. Normal appendix. 



PERITONEUM:

4 centimeter calcified mass in the central mesenteric root. 



LYMPH NODES:

Unremarkable. No enlarged lymph nodes. 



BLADDER:

Unremarkable. 



REPRODUCTIVE:

Unremarkable. 



BONES:

No acute fracture. 



OTHER FINDINGS:

None.



IMPRESSION:

4 centimeter calcified mass in the central mesenteric root which is 

nonspecific. Carcinoid tumor is not excluded.



Cholelithiasis.

## 2017-04-22 NOTE — RAD
HISTORY:

 cough 



COMPARISON:

No prior. 



FINDINGS:



LUNGS:

Chronic interstitial changes.



PLEURA:

No significant pleural effusion identified, no pneumothorax apparent.



CARDIOVASCULAR:

Normal. Status post CABG. 



OSSEOUS STRUCTURES:

No significant abnormalities.



VISUALIZED UPPER ABDOMEN:

Normal.



OTHER FINDINGS:

None.



IMPRESSION:

Chronic interstitial changes. Status post CABG.

## 2017-04-22 NOTE — CARD
--------------- APPROVED REPORT --------------





EKG Measurement

Heart [id number]

[id number]

[id number]

[id number]



<Conclusion>

Atrial fibrillation with rapid ventricular response

Left axis deviation

Moderate voltage criteria for LVH, may be normal variant

ST depression, consider subendocardial injury or digitalis effect

Abnormal ECG

## 2017-04-23 LAB
ADD MANUAL DIFF?: NO
ALBUMIN/GLOB SERPL: 1 {RATIO} (ref 1.1–1.8)
ALP SERPL-CCNC: 51 U/L (ref 38–133)
ALT SERPL-CCNC: 33 U/L (ref 7–56)
AST SERPL-CCNC: 31 U/L (ref 15–39)
BASOPHILS # BLD AUTO: 0.07 K/MM3 (ref 0–2)
BASOPHILS NFR BLD: 0.7 % (ref 0–3)
BILIRUB SERPL-MCNC: 0.8 MG/DL (ref 0.2–1.3)
BUN SERPL-MCNC: 27 MG/DL (ref 7–21)
CALCIUM SERPL-MCNC: 8.6 MG/DL (ref 8.4–10.5)
CHLORIDE SERPL-SCNC: 105 MMOL/L (ref 98–107)
CO2 SERPL-SCNC: 28 MMOL/L (ref 21–33)
EOSINOPHIL # BLD: 0.3 10*3/UL (ref 0–0.7)
EOSINOPHIL NFR BLD: 3 % (ref 1.5–5)
ERYTHROCYTE [DISTWIDTH] IN BLOOD BY AUTOMATED COUNT: 13 % (ref 11.5–14.5)
GLOBULIN SER-MCNC: 3.3 GM/DL
GLUCOSE SERPL-MCNC: 145 MG/DL (ref 70–110)
GRANULOCYTES # BLD: 7.19 10*3/UL (ref 1.4–6.5)
GRANULOCYTES NFR BLD: 68.2 % (ref 50–68)
HCT VFR BLD CALC: 36.1 % (ref 36–48)
INR PPP: 1.02 (ref 0.93–1.08)
LYMPHOCYTES # BLD: 2 10*3/UL (ref 1.2–3.4)
LYMPHOCYTES NFR BLD AUTO: 18.6 % (ref 22–35)
MCH RBC QN AUTO: 30.5 PG (ref 25–35)
MCHC RBC AUTO-ENTMCNC: 33.2 G/DL (ref 31–37)
MCV RBC AUTO: 91.9 FL (ref 80–105)
MONOCYTES # BLD AUTO: 1 10*3/UL (ref 0.1–0.6)
MONOCYTES NFR BLD: 9.5 % (ref 1–6)
PLATELET # BLD: 217 10^3/UL (ref 120–450)
PMV BLD AUTO: 9.9 FL (ref 7–11)
POTASSIUM SERPL-SCNC: 3.6 MMOL/L (ref 3.6–5)
PROT SERPL-MCNC: 6.5 G/DL (ref 5.8–8.3)
SODIUM SERPL-SCNC: 142 MMOL/L (ref 132–148)
WBC # BLD AUTO: 10.5 10^3/UL (ref 4.5–11)

## 2017-04-23 RX ADMIN — ENOXAPARIN SODIUM SCH MG: 60 INJECTION SUBCUTANEOUS at 09:54

## 2017-04-23 RX ADMIN — DILTIAZEM HYDROCHLORIDE PRN MLS/HR: 100 INJECTION, POWDER, LYOPHILIZED, FOR SOLUTION INTRAVENOUS at 17:56

## 2017-04-23 RX ADMIN — ENOXAPARIN SODIUM SCH MG: 60 INJECTION SUBCUTANEOUS at 21:58

## 2017-04-23 RX ADMIN — PANTOPRAZOLE SODIUM SCH MG: 40 TABLET, DELAYED RELEASE ORAL at 06:44

## 2017-04-23 NOTE — RAD
HISTORY:

 chf 



COMPARISON:

No prior. 



FINDINGS:



LUNGS:

Chronic interstitial changes.



PLEURA:

No significant pleural effusion identified, no pneumothorax apparent.



CARDIOVASCULAR:

Normal.



OSSEOUS STRUCTURES:

No significant abnormalities.



VISUALIZED UPPER ABDOMEN:

Normal.



OTHER FINDINGS:

Status post CABG.



IMPRESSION:

Chronic interstitial changes.

## 2017-04-23 NOTE — PN
DATE: 04/23/2017



REFERRING PHYSICIAN:  Dr. Muller.



Also covering Dr. Muller.



SUBJECTIVE:  She is sitting up in bed having lunch.  Night was unremarkable.  Has recurrent atrial fi
brillation with rapid ventricular response requiring a bolus of Cardizem and restarting drip with 10 
mg per hour.  No headache, no rhinitis, no nausea, no vomiting, diarrhea.  No leg pain or leg swellin
g.



OBJECTIVE:

GENERAL:  No acute distress.

VITAL SIGNS:  Temperature is 98, heart rate is 120 with AFib, blood pressure 143/51, pulse ox 94% on 
room air.

HEENT:  Moist mucous membranes.  Small oral cavity.

NECK:  Supple.  No JVD.

LUNGS:  Has a few scattered rhonchi.

HEART:  S1 and S2, irregular, tachycardic.

ABDOMEN:  Soft, nontender.  No organomegaly.

EXTREMITIES:  There is no edema.

NEUROLOGIC:  Awake, alert, follows simple commands, but confused.



MEDICATIONS:  She is on Cardizem 60 mg q. 8 hours, also on Cardizem drip, which is decreased to 5 mg 
per hour, Coumadin 3 mg will be given, Cozaar 100 mg daily, Lovenox 60 mg subQ q. 12 hours, Protonix 
40 mg daily, IV fluid normal saline 100 mL per hour.



LABORATORY DATA:  Shows hemoglobin 12.0, hematocrit 36.1, WBC ____, platelet count is 217.  INR 1.02.
  Sodium 142, potassium 3.6, chloride 105, bicarbonate 28, BUN 27, creatinine 0.6, glucose 145, calci
um 8.6, AST 31, ALT 33, alkaline phosphatase is 51.  ProBNP ____, albumin is 3.3.



MICROBIOLOGY:  Blood culture, urine culture ____ is unremarkable.  Chest x-ray done today shows chron
ic interstitial changes.  No new infiltrate reported.



IMPRESSION AND PLAN:  Atrial fibrillation with rapid ventricular response, chronic lung disease, has 
interstitial infiltrate, abdominal mass, Alzheimer type dementia, hypertension, diabetes.  Case discu
ssed with intensivist, Dr. Calixto, in detail.  I agree with switching to p.o. Cardizem.  If able to
 control rate, may add a small dose of beta blocker.  Gastric prophylaxis.  On anticoagulation.  Foll
ow up CBC, CMP, chest x-ray in the morning.  Procalcitonin level is still pending.  Will follow with 
you.





__________________________________________

Claire Barcenas MD







cc:



DD: 04/23/2017 17:33:47  336

TT: 04/23/2017 18:42:03

Confirmation # [id number]

Dictation # [id number]

rn

## 2017-04-23 NOTE — CP.CCUPN
<Brayden San - Last Filed: 04/23/17 11:26>





CCU Subjective





- Physician Review


Subjective (Free Text): 


04/23/17 11:07


Patient seen and examined at bedside in the ICU.  This is hospital day Overnight

, patient's Afib with RVR was brought under control on cardizem drip, and the 

HR decreased to 50's-60's, so the drip was stopped.  This AM however, the AFib 

with RVR resumed, and with HR from 130-150's, so Cardizem drip was restarted 

and patient was also started on oral cardizem, 60mg q8h.  Today, patient 

remains AAOx3, but remains intermittently confused, with poor insight into her 

condition or why she is in the hospital.  She denies continued sensation of 

"being on TV with everyone watching me," but frequently requires re-orientation 

regarding who staff is and why she was brought to the hospital.  ROS limited 

due to patient's mentation, but denies chest pain, shortness of breath, or new 

focal deficits.





CCU Objective





- Vital Signs / Intake & Output


Vital Signs (Last 4 hours): 


Vital Signs











  Temp Pulse Resp BP Pulse Ox


 


 04/23/17 09:00   119 H  28 H  97/55 L  94 L


 


 04/23/17 08:00  98.6 F  126 H  24  139/79  91 L


 


 04/23/17 07:53   133 H   165/71 H 


 


 04/23/17 07:48   135 H  24  


 


 04/23/17 07:47   133 H  27 H  


 


 04/23/17 07:16   140 H  19  


 


 04/23/17 07:15   143 H  19  


 


 04/23/17 07:14   135 H  22  











Intake and Output (Last 8hrs): 


 Intake & Output











 04/22/17 04/23/17 04/23/17





 22:59 06:59 14:59


 


Intake Total 1500 1200 


 


Output Total 400 1040 


 


Balance 1100 160 


 


Weight  56.245 kg 


 


Intake:   


 


  IV 1260 1200 


 


    rfa 1200  


 


    right forearm 60 1200 


 


  Oral 240  


 


Output:   


 


  Urine 400 1040 


 


    Urethral (Brown) 400 1040 


 


Other:   


 


  # Bowel Movements 0  














- Physical Exam


Head: Positive for: Atraumatic, Normocephalic.  Negative for: Contusion, 

Swelling, Ecchymosis, Abrasion


Pupils: Negative for: Pinpoint


Extroacular Muscles: Positive for: EOMI.  Negative for: Gaze Palsy, Entrapment


Conjunctiva: Positive for: Normal.  Negative for: Injected, Icteric


Mouth: Positive for: Moist Mucous Membranes, Normal Tounge


Nose (External): Positive for: Atraumatic.  Negative for: Abrasion, Contusion, 

Laceration


Neck: Positive for: Normal Range of Motion, Trachea Midline.  Negative for: 

MIDLINE TENDERNESS, JVD


Respiratory/Chest: Positive for: Clear to Auscultation, Good Air Exchange.  

Negative for: Respiratory Distress, Accessory Muscle Use, Wheezes, Decreased 

Breath Sounds, Rales, Rhonchi, Tachypneic, Tender to Palpation


Cardiovascular: Positive for: Normal S1, S2, Irregular Rhythm, Peripheal Pulses 

Present (+2 radials bilaterally), Tachycardic, Other (irregularly irregular, 

rate on bedside monitor 90's-110's throughout exam).  Negative for: Regular 

Rate and Rhythm, Murmurs, Bradycardic


Abdomen: Positive for: Normal Bowel Sounds.  Negative for: Tenderness, 

Distention, Peritoneal Signs, Mass/Organomegaly


Upper Extremity: Positive for: Normal Inspection, Normal ROM, NORMAL PULSES (+2 

radials bilaterally).  Negative for: Cyanosis, Edema, Tenderness, Swelling, 

Erythema


Lower Extremity: Positive for: Normal Inspection.  Negative for: CALF TENDERNESS

, Cyanosis


Neurological: Positive for: GCS=15, CN II-XII Intact, Speech Normal, Motor Func 

Grossly Intact


Skin: Positive for: Warm, Dry, Normal Color.  Negative for: Rashes


Psychiatric: Positive for: Alert, Oriented x 3 (oriented to self, location, year

, president, but intermittenly confused regarding who she is speaking to (

requires freqent re-orientation to staff)), Normal Affect, Anxious.  Negative 

for: Normal Insight, Normal Concentration





- Medications


Active Medications: 


Active Medications











Generic Name Dose Route Start Last Admin





  Trade Name Freq  PRN Reason Stop Dose Admin


 


Diltiazem HCl  60 mg 04/23/17 14:00  





  Cardizem  PO   





  Q8H [facility]   


 


Enoxaparin Sodium  60 mg 04/21/17 22:00 04/23/17 09:54





  Lovenox  SC   60 mg





  Q12H [facility]   Administration





  Protocol   


 


diltiaZEM IVPB 100mg in NS  100 mls @ 5 mls/hr 04/21/17 21:41 04/22/17 17:16





  Cardizem 100mg In Ns  IV   5 mls/hr





  .Q20H PRN   Administration





  TITRATE PER MD ORDER   





  Protocol   





  5 MG/HR   


 


Sodium Chloride  1,000 mls @ 100 mls/hr 04/21/17 22:00 04/22/17 22:15





  Sodium Chloride 0.9%  IV   100 mls/hr





  .Q10H [facility]   Administration


 


Losartan Potassium  100 mg 04/22/17 10:00 04/23/17 09:55





  Cozaar  PO   100 mg





  DAILY [facility]   Administration


 


Pantoprazole Sodium  40 mg 04/22/17 06:30 04/23/17 06:44





  Protonix Ec Tab  PO   40 mg





  0630 [facility]   Administration


 


Warfarin Sodium  3 mg 04/22/17 18:00 04/22/17 17:19





  Coumadin  PO   3 mg





  1800 [facility]   Administration





  Protocol   














- Patient Studies


Lab Studies: 


 Microbiology Studies











 04/22/17 08:11 MRSA Culture (Admit) - Final





 Nose    MRSA NOT DETECTED








 Lab Studies











  04/23/17 04/21/17 Range/Units





  05:00 23:25 


 


WBC  10.5   (4.5-11.0)  10^3/ul


 


RBC  3.93   (3.5-6.1)  10^6/uL


 


Hgb  12.0   (12.0-16.0)  gm/dL


 


Hct  36.1   (36.0-48.0)  %


 


MCV  91.9   (80.0-105.0)  fL


 


MCH  30.5   (25.0-35.0)  pg


 


MCHC  33.2   (31.0-37.0)  g/dl


 


RDW  13.0   (11.5-14.5)  %


 


Plt Count  217   (120.0-450.0)  10^3/uL


 


MPV  9.9   (7.0-11.0)  fl


 


Gran %  68.2 H   (50.0-68.0)  %


 


Lymph % (Auto)  18.6 L   (22.0-35.0)  %


 


Mono % (Auto)  9.5 H   (1.0-6.0)  %


 


Eos % (Auto)  3.0   (1.5-5.0)  %


 


Baso % (Auto)  0.7   (0.0-3.0)  %


 


Gran #  7.19 H   (1.4-6.5)  


 


Lymph #  2.0   (1.2-3.4)  


 


Mono #  1.0 H   (0.1-0.6)  


 


Eos #  0.3   (0.0-0.7)  


 


Baso #  0.07   (0.0-2.0)  K/mm3


 


PT  11.0   (9.9-11.8)  Seconds


 


INR  1.02   (0.93-1.08)  


 


Sodium  142   (132-148)  mmol/L


 


Potassium  3.6   (3.6-5.0)  mmol/L


 


Chloride  105   ()  mmol/L


 


Carbon Dioxide  28   (21-33)  mmol/L


 


Anion Gap  13   (10-20)  


 


BUN  27 H   (7-21)  mg/dL


 


Creatinine  0.6   (0.5-1.4)  mg/dL


 


Est GFR (African Amer)  > 60   


 


Est GFR (Non-Af Amer)  > 60   


 


Random Glucose  145 H   ()  mg/dL


 


Calcium  8.6   (8.4-10.5)  mg/dL


 


Total Bilirubin  0.8   (0.2-1.3)  mg/dL


 


AST  31   (15-39)  U/L


 


ALT  33   (7-56)  U/L


 


Alkaline Phosphatase  51   ()  U/L


 


Total Protein  6.5   (5.8-8.3)  g/dL


 


Albumin  3.3   (3.0-4.8)  g/dL


 


Globulin  3.3   gm/dL


 


Albumin/Globulin Ratio  1.0 L   (1.1-1.8)  


 


RPR   Nonreactive  (NONREACTIVE)  








 Laboratory Results - last 24 hr











  04/21/17 04/23/17





  23:25 05:00


 


WBC   10.5


 


RBC   3.93


 


Hgb   12.0


 


Hct   36.1


 


MCV   91.9


 


MCH   30.5


 


MCHC   33.2


 


RDW   13.0


 


Plt Count   217


 


MPV   9.9


 


Gran %   68.2 H


 


Lymph % (Auto)   18.6 L


 


Mono % (Auto)   9.5 H


 


Eos % (Auto)   3.0


 


Baso % (Auto)   0.7


 


Gran #   7.19 H


 


Lymph #   2.0


 


Mono #   1.0 H


 


Eos #   0.3


 


Baso #   0.07


 


PT   11.0


 


INR   1.02


 


Sodium   142


 


Potassium   3.6


 


Chloride   105


 


Carbon Dioxide   28


 


Anion Gap   13


 


BUN   27 H


 


Creatinine   0.6


 


Est GFR ( Amer)   > 60


 


Est GFR (Non-Af Amer)   > 60


 


Random Glucose   145 H


 


Calcium   8.6


 


Total Bilirubin   0.8


 


AST   31


 


ALT   33


 


Alkaline Phosphatase   51


 


Total Protein   6.5


 


Albumin   3.3


 


Globulin   3.3


 


Albumin/Globulin Ratio   1.0 L


 


RPR  Nonreactive 














Review of Systems





- Review of Systems


Systems not reviewed;Unavailable: Altered Mental Status (AAOx3, but requires 

frequent orientation to staff, no insight into condition, wandering and 

illogical thoughts, not reliable historian)





Critical Care Progress Note





- Nutrition


Nutrition: 


 Nutrition











 Category Date Time Status


 


 Heart Healthy Diet [DIET] Diets  04/22/17 Breakfast Ordered














Assessment/Plan





- Assessment and Plan (Free Text)


Assessment: 


This is an 86 yo  F with PMH of HTN, NIDDM, COPD, CAD s/p CABG, Afib 

on coumadin, breast cancer s/p mastectomy and chemotherapy and dementia who 

initially presented to [facility] [place] for altered mental status.  She was found to have 

bilateral pulmonary infiltrates, and she was admitted to ICU for possible 

sepsis and AMS 2/2 sepsis vs septic encephalopathy.  She was also being managed 

for AFib with RVR on cardizem drip, pending transition from drip to oral 

medication.


Plan: 


Neuro:


-AAO x4 (self, location, year, president), but disoriented to staff requiring 

frequent orientation, poor insight into condition, reason for hospitalization


-moving all extremities spontaneously


-maintain normothermia


-high fall risk given dementia/AMS and on anticoagulation





Pulm:


-CTAB on exam, satting well on 3L NC


-Conservative O2 management, maintain SaO2 > 88% (hx COPD) and paO2 > 60


-Initial presentation was concerning for possible community-acquired pneumonia, 

given Ceftriaxone and Azithromycin in the ED


-CXR on admit read as chronic interstitial changes s/p CABG; repeat CXR today 

read as chronic interstitial changes, no acute changes or infiltrates noted


-Aspiration precautions, head of bed to 30 degrees


-Procal ordered, pending


-Blood and Urine cultures negative x24 hours


-Pulm (Dr. Barcenas) on board, appreciate all recs





Cardio:


-AFib with RVR, was previously brought under control on Cardizem drip, relapsed 

into RVR after drip discontinued, currently on Drip 5mg/hr and started on 

Cardizem 60mg PO q8 to wean from drip; HR 90's-110's at time of exam


-Trop on admit 0.02


-BNP on admit 997, increased to 2180 yesterday


-INR subtherapeutic at 1.02 (was 1.07), bridging to Coumadin with therapeutic 

Lovenox


-Continue Losartan for HTN


-NS 100cc/hr IVF





GI:


-Heart-healthy consistent carb diet


-Protonix for GI ppx


-Incidental 4cm calcified mass found in central mesenteric root on CT Abd/pelvis

, suspicious for Carcinoid tumor


-GI consulted (Dr. Helton), appreciate all recs





Renal:


-Making clear yellow urine


-UA on admit notable for 30 protein, 100 glucose, 15 ketones, and trace intact 

blood


-Cr 0.6


-avoid nephrotoxic drugs where feasible


-maintain euvolemia and euglycemia (-180)


-monitor and replete electrolytes as needed





ID:


-Leukocytosis on admit of 12.3, improved to 10.5 today


-afebrile


-RPR negative


-Procal pending, f/u





Heme:


-Hgb 12.0, was 13.8


-on AC for AFib, but INR subtherapeutic at 1.02, bridging to Coumadin on 

therapeutic Lovenox





Endo:


-maintain euglycemia (-180)





Dispo: ICU, pending transfer to telemetry, pending d/c of cardizem drip after 

transitioned to PO cardizem


FEN: Heart-healthy consistent carb


Access: Peripheral IV


Consults: GI


Ppx: Protonix for GI, Coumadin/Therapeutic Lovenox covers for DVT





Patient seen, reviewed, and discussed with attending, Dr. Calixto.





- Date & Time


Date: 04/23/17


Time: 12:25





<Chinmay Calixto - Last Filed: 04/23/17 13:39>





CCU Objective





- Vital Signs / Intake & Output


Vital Signs (Last 4 hours): 


Vital Signs











  Temp Pulse Ox


 


 04/23/17 12:00  98.1 F  95











Intake and Output (Last 8hrs): 


 Intake & Output











 04/22/17 04/23/17 04/23/17





 22:59 06:59 14:59


 


Intake Total 1500 1200 


 


Output Total 400 1040 


 


Balance 1100 160 


 


Weight  124 lb 


 


Intake:   


 


  IV 1260 1200 


 


    rfa 1200  


 


    right forearm 60 1200 


 


  Oral 240  


 


Output:   


 


  Urine 400 1040 


 


    Urethral (Brown) 400 1040 


 


Other:   


 


  # Bowel Movements 0  














- Medications


Active Medications: 


Active Medications











Generic Name Dose Route Start Last Admin





  Trade Name Freq  PRN Reason Stop Dose Admin


 


Diltiazem HCl  60 mg 04/23/17 14:00  





  Cardizem  PO   





  Q8H [facility]   


 


Enoxaparin Sodium  60 mg 04/21/17 22:00 04/23/17 09:54





  Lovenox  SC   60 mg





  Q12H [facility]   Administration





  Protocol   


 


diltiaZEM IVPB 100mg in NS  100 mls @ 5 mls/hr 04/21/17 21:41 04/22/17 17:16





  Cardizem 100mg In Ns  IV   5 mls/hr





  .Q20H PRN   Administration





  TITRATE PER MD ORDER   





  Protocol   





  5 MG/HR   


 


Sodium Chloride  1,000 mls @ 100 mls/hr 04/21/17 22:00 04/22/17 22:15





  Sodium Chloride 0.9%  IV   100 mls/hr





  .Q10H [facility]   Administration


 


Losartan Potassium  100 mg 04/22/17 10:00 04/23/17 09:55





  Cozaar  PO   100 mg





  DAILY [facility]   Administration


 


Pantoprazole Sodium  40 mg 04/22/17 06:30 04/23/17 06:44





  Protonix Ec Tab  PO   40 mg





  0630 [facility]   Administration


 


Warfarin Sodium  3 mg 04/22/17 18:00 04/22/17 17:19





  Coumadin  PO   3 mg





  1800 [facility]   Administration





  Protocol   














- Patient Studies


Lab Studies: 


 Microbiology Studies











 04/22/17 08:11 MRSA Culture (Admit) - Final





 Nose    MRSA NOT DETECTED








 Lab Studies











  04/23/17 04/21/17 Range/Units





  05:00 23:25 


 


WBC  10.5   (4.5-11.0)  10^3/ul


 


RBC  3.93   (3.5-6.1)  10^6/uL


 


Hgb  12.0   (12.0-16.0)  gm/dL


 


Hct  36.1   (36.0-48.0)  %


 


MCV  91.9   (80.0-105.0)  fL


 


MCH  30.5   (25.0-35.0)  pg


 


MCHC  33.2   (31.0-37.0)  g/dl


 


RDW  13.0   (11.5-14.5)  %


 


Plt Count  217   (120.0-450.0)  10^3/uL


 


MPV  9.9   (7.0-11.0)  fl


 


Gran %  68.2 H   (50.0-68.0)  %


 


Lymph % (Auto)  18.6 L   (22.0-35.0)  %


 


Mono % (Auto)  9.5 H   (1.0-6.0)  %


 


Eos % (Auto)  3.0   (1.5-5.0)  %


 


Baso % (Auto)  0.7   (0.0-3.0)  %


 


Gran #  7.19 H   (1.4-6.5)  


 


Lymph #  2.0   (1.2-3.4)  


 


Mono #  1.0 H   (0.1-0.6)  


 


Eos #  0.3   (0.0-0.7)  


 


Baso #  0.07   (0.0-2.0)  K/mm3


 


PT  11.0   (9.9-11.8)  Seconds


 


INR  1.02   (0.93-1.08)  


 


Sodium  142   (132-148)  mmol/L


 


Potassium  3.6   (3.6-5.0)  mmol/L


 


Chloride  105   ()  mmol/L


 


Carbon Dioxide  28   (21-33)  mmol/L


 


Anion Gap  13   (10-20)  


 


BUN  27 H   (7-21)  mg/dL


 


Creatinine  0.6   (0.5-1.4)  mg/dL


 


Est GFR (African Amer)  > 60   


 


Est GFR (Non-Af Amer)  > 60   


 


Random Glucose  145 H   ()  mg/dL


 


Calcium  8.6   (8.4-10.5)  mg/dL


 


Total Bilirubin  0.8   (0.2-1.3)  mg/dL


 


AST  31   (15-39)  U/L


 


ALT  33   (7-56)  U/L


 


Alkaline Phosphatase  51   ()  U/L


 


Total Protein  6.5   (5.8-8.3)  g/dL


 


Albumin  3.3   (3.0-4.8)  g/dL


 


Globulin  3.3   gm/dL


 


Albumin/Globulin Ratio  1.0 L   (1.1-1.8)  


 


RPR   Nonreactive  (NONREACTIVE)  








 Laboratory Results - last 24 hr











  04/21/17 04/23/17





  23:25 05:00


 


WBC   10.5


 


RBC   3.93


 


Hgb   12.0


 


Hct   36.1


 


MCV   91.9


 


MCH   30.5


 


MCHC   33.2


 


RDW   13.0


 


Plt Count   217


 


MPV   9.9


 


Gran %   68.2 H


 


Lymph % (Auto)   18.6 L


 


Mono % (Auto)   9.5 H


 


Eos % (Auto)   3.0


 


Baso % (Auto)   0.7


 


Gran #   7.19 H


 


Lymph #   2.0


 


Mono #   1.0 H


 


Eos #   0.3


 


Baso #   0.07


 


PT   11.0


 


INR   1.02


 


Sodium   142


 


Potassium   3.6


 


Chloride   105


 


Carbon Dioxide   28


 


Anion Gap   13


 


BUN   27 H


 


Creatinine   0.6


 


Est GFR ( Amer)   > 60


 


Est GFR (Non-Af Amer)   > 60


 


Random Glucose   145 H


 


Calcium   8.6


 


Total Bilirubin   0.8


 


AST   31


 


ALT   33


 


Alkaline Phosphatase   51


 


Total Protein   6.5


 


Albumin   3.3


 


Globulin   3.3


 


Albumin/Globulin Ratio   1.0 L


 


RPR  Nonreactive 














Critical Care Progress Note





- Nutrition


Nutrition: 


 Nutrition











 Category Date Time Status


 


 Heart Healthy Diet [DIET] Diets  04/22/17 Breakfast Ordered














Addendum


Addendum: 





04/23/17 13:36


patient was seen, examined and discussed shoulder to shoulder with Dr. San. 

His note reflects my exam, assessment and plan except as below. Meds/Labs/ONE 

reviewed





86 yo female with afib/rvr due to dehydration. Still on cardizem drip at 5 mg/hr

, but will overlap with cardizem 60 mg PO q8h and wean drip off. Apart from afib

/RVR hemodynamically relatively stable. Ok to downgrade to tele. cardiology 

consult is recommended.





ccm time 40 min

## 2017-04-24 LAB
ADD MANUAL DIFF?: NO
ALBUMIN/GLOB SERPL: 1.1 {RATIO} (ref 1.1–1.8)
ALP SERPL-CCNC: 53 U/L (ref 38–133)
ALT SERPL-CCNC: 35 U/L (ref 7–56)
AST SERPL-CCNC: 24 U/L (ref 15–39)
BASOPHILS # BLD AUTO: 0.09 K/MM3 (ref 0–2)
BASOPHILS NFR BLD: 0.9 % (ref 0–3)
BILIRUB SERPL-MCNC: 0.9 MG/DL (ref 0.2–1.3)
BUN SERPL-MCNC: 20 MG/DL (ref 7–21)
CALCIUM SERPL-MCNC: 8.6 MG/DL (ref 8.4–10.5)
CHLORIDE SERPL-SCNC: 105 MMOL/L (ref 98–107)
CO2 SERPL-SCNC: 26 MMOL/L (ref 21–33)
EOSINOPHIL # BLD: 0.4 10*3/UL (ref 0–0.7)
EOSINOPHIL NFR BLD: 3.8 % (ref 1.5–5)
ERYTHROCYTE [DISTWIDTH] IN BLOOD BY AUTOMATED COUNT: 13.1 % (ref 11.5–14.5)
GLOBULIN SER-MCNC: 3.1 GM/DL
GLUCOSE SERPL-MCNC: 159 MG/DL (ref 70–110)
GRANULOCYTES # BLD: 6.35 10*3/UL (ref 1.4–6.5)
GRANULOCYTES NFR BLD: 65.2 % (ref 50–68)
HCT VFR BLD CALC: 35.4 % (ref 36–48)
INR PPP: 1.05 (ref 0.93–1.08)
LYMPHOCYTES # BLD: 1.9 10*3/UL (ref 1.2–3.4)
LYMPHOCYTES NFR BLD AUTO: 19.4 % (ref 22–35)
MCH RBC QN AUTO: 31.5 PG (ref 25–35)
MCHC RBC AUTO-ENTMCNC: 34.5 G/DL (ref 31–37)
MCV RBC AUTO: 91.5 FL (ref 80–105)
MONOCYTES # BLD AUTO: 1 10*3/UL (ref 0.1–0.6)
MONOCYTES NFR BLD: 10.7 % (ref 1–6)
PLATELET # BLD: 211 10^3/UL (ref 120–450)
PMV BLD AUTO: 10.2 FL (ref 7–11)
POTASSIUM SERPL-SCNC: 3.5 MMOL/L (ref 3.6–5)
PROT SERPL-MCNC: 6.3 G/DL (ref 5.8–8.3)
SODIUM SERPL-SCNC: 140 MMOL/L (ref 132–148)
WBC # BLD AUTO: 9.7 10^3/UL (ref 4.5–11)

## 2017-04-24 RX ADMIN — INSULIN HUMAN SCH UNITS: 100 INJECTION, SOLUTION PARENTERAL at 11:50

## 2017-04-24 RX ADMIN — ENOXAPARIN SODIUM SCH MG: 60 INJECTION SUBCUTANEOUS at 08:18

## 2017-04-24 RX ADMIN — PANTOPRAZOLE SODIUM SCH MG: 40 TABLET, DELAYED RELEASE ORAL at 06:18

## 2017-04-24 RX ADMIN — ENOXAPARIN SODIUM SCH MG: 60 INJECTION SUBCUTANEOUS at 21:52

## 2017-04-24 RX ADMIN — INSULIN HUMAN SCH: 100 INJECTION, SOLUTION PARENTERAL at 21:52

## 2017-04-24 RX ADMIN — ENOXAPARIN SODIUM SCH: 60 INJECTION SUBCUTANEOUS at 09:06

## 2017-04-24 RX ADMIN — INSULIN HUMAN SCH UNITS: 100 INJECTION, SOLUTION PARENTERAL at 18:11

## 2017-04-24 NOTE — PN
DATE: 04/23/2017



SUBJECTIVE:  This patient was seen and evaluated earlier.  The patient is comfortable, confused.



PHYSICAL EXAMINATION:

VITAL SIGNS:  Temperature is 98.3, pulse 83, blood pressure is 135/77.

HEENT:  Atraumatic, anicteric.

NECK:  Supple.

HEART:  S1, S2 heard.

LUNGS:  Bilateral air entry present.

ABDOMEN:  Soft.  There is no tenderness.

EXTREMITIES:  No cyanosis.  No clubbing.

NEUROLOGIC:  The patient is alert, awake, confused.



LABORATORY DATA:  Hemoglobin 12, hematocrit 36.1, WBC is 10.5, platelets 20.  Chemistry is essentiall
y unremarkable.



IMPRESSION:  This is an 87-year-old patient admitted with a change of mental status, history of atria
l fibrillation with rapid ventricular response.  The patient was found to have atrial fibrillation wi
th rapid ventricular response, history of chronic lung disease, history of ______ infiltrate.  The pa
tient is on antibiotics.  The patient is on Cardizem p.o.  The CT scan showed abnormalities in the mi
ssion that lesion, but appeared to be present, even the CAT scan done more than 2 years ago.  It appe
ared to be a benign process _____, but it is difficult.  The patient would need followup studies.  We
 will consider MRI to further evaluate.  When the _____ is more optimized.  We also reviewed with rad
iologist indicating in the previous CAT scan.   The name.  We will continue with other comorbidities 
including diabetes mellitus, chronic lung disease, dementia, hypertension.



We will continue to review the history.  We will continue to closely follow up her care and suggest f
urther management based on the clinical course.





__________________________________________

Stef Helton MD







cc:



DD: 04/24/2017 00:13:32  [id number]

TT: 04/24/2017 01:24:33

Confirmation # [id number]

Dictation # [id number]

mn

## 2017-04-24 NOTE — CON
DATE: 04/24/2017



HISTORY OF PRESENT ILLNESS:  The patient is an 87-year-old woman who presents with atrial fibrillatio
n.



The patient has had paroxysmal atrial fibrillation which she has been treated with sotalol in the pas
t.  



PAST MEDICAL HISTORY:  Includes chronic atrial fibrillation in which she has been treated with sotalo
l as well as warfarin.



She suffers from hypertension and diabetes mellitus.



The patient has chronic dementia.  She is status post coronary artery bypass surgery.



SOCIAL HISTORY AND REVIEW OF SYSTEMS:  Unavailable.



PHYSICAL EXAMINATION:

VITAL SIGNS:  Blood pressure 145/67, heart rate is in the 70s, normal sinus rhythm.

NECK:  Negative JVD.

LUNGS:  ____.

HEART:  A III/VI systolic ejection murmur at the base as well as in the apex.

EXTREMITIES:  Without edema.

NEUROLOGIC:  The patient is confused with decreased memory.



EKG shows atrial fibrillation with nonspecific ST-T changes.



LABORATORY DATA:  Hemoglobin is 12.2.  Chemistries:  The BUN and creatinine are unremarkable.  Glucos
e is 159.  Troponins are negative x 1.



Her PT/INR is 1.05.



IMPRESSION:

1.  Paroxysmal atrial fibrillation which the patient is now back in normal sinus rhythm.

2.  History of coronary artery bypass surgery.

3.  Coronary artery disease. 

4.  Diabetes mellitus.

5.  Hypertension.

6.  Hypercholesterolemia.



Given these findings, we will restart her sotalol.  We will continue her Lovenox until her warfarin b
rings her INR back to therapeutic levels.  Will order an echocardiogram.





__________________________________________

Guerrero Iqbal MD







cc:



DD: 04/24/2017 09:44:18  [id number]

TT: 04/24/2017 10:00:17

Confirmation # [id number]

Dictation # [id number]

mn

## 2017-04-24 NOTE — PN
DATE: 04/24/2017



REFERRING PHYSICIAN:  Dr. Muller.



SUBJECTIVE:  She is sitting up in a bed.  Still on Cardizem, _____ AFib with rapid ventricular respon
se.  No nausea, no vomiting, diarrhea.  No leg pain or leg swelling.



OBJECTIVE:

GENERAL:  No acute distress.

VITAL SIGNS:  Temperature is 98, heart rate is 73, respiratory rate is 20, blood pressure 169/80, pul
se ox 100% on nasal cannula.

HEENT:  Moist mucous membrane.  No ulcer or thrush noted.

NECK:  Supple.  No JVD.

LUNGS:  Has a fair airflow with few rhonchi.

HEART:  S1 and S2.  Irregular and tachycardic.

ABDOMEN:  Soft, nontender.  No organomegaly.

EXTREMITIES:  No edema.

NEUROLOGIC:  Awake, alert, follows simple command.



MEDICATIONS:  She is on sotalol 80 mg twice a day, Coumadin 5 mg given, Cozaar 100 mg daily, insulin 
coverage, Lovenox 60 mg q. 12 hours, Protonix 40 mg daily, IV fluid normal saline 100 mL per hour.



LABORATORY DATA:  Shows hemoglobin 12.2, hematocrit 35.4, WBC 9.7, platelet is 211.  INR 1.05.  Sodiu
m 140, potassium 3.5, chloride 105, bicarbonate 26, BUN 20, creatinine 0.6, glucose 159, calcium 8.6,
 AST 24, ALT 35, alkaline phosphatase is 53, albumin is 3.3.  Procalcitonin 0.05.



MICROBIOLOGY:  Blood culture, urine culture and nasal is unremarkable.  Had echocardiogram done today
, which shows the left ventricle is normal size, there is mild concentric left ventricular hypertroph
y, left ventricular function is normal, aortic valve is mildly calcified, mild valvular aortic sclero
sis, right ventricular systolic pressure is 25.



IMPRESSION AND PLAN:  Atrial fibrillation with rapid ventricular response, chronic obstructive lung d
isease, interstitial infiltrate, has abdominal mass?, Alzheimer type dementia, hypertension, diabetes
.  Case discussed with intensivist.  I also spoke to nursing staff.  The patient seen by cardiology a
nd placed back on sotalol, Cardizem _____.  Pulmonary point of view, she is doing okay, but needs fol
lowup x-ray to assure the stability of chest x-ray, aspiration precaution, gastric prophylaxis, GI fo
llowup.  Thank you and will follow with you.





__________________________________________

Claire Barcenas MD







cc:



DD: 04/24/2017 20:09:22  [id number]

TT: 04/24/2017 21:23:14

Confirmation # [id number]

Dictation # [id number]

mn

## 2017-04-24 NOTE — CON
DATE: 04/21/2017



This patient was seen and evaluated earlier.  I discussed with the intensivist, Dr. Calixto, and als
o the nursing staff.  



This 87-year-old patient with a past medical history of hypertension, COPD, atrial fibrillation, diab
etes mellitus, history of breast cancer, bilateral mastectomy, history of dementia, reduced hearing w
as presented to the Emergency Room with change of mental status.  The patient was found to have AFib 
with rapid ventricular response, controlled with Cardizem.  The patient had a CT of the chest with CT
 of the abdomen done, which showed some calcified mass in the mesenteric area.  GI consultation was r
equested to further evaluate.  The patient denies any vomiting, abdominal pain.  Other past medical h
istory is as above.



SOCIAL HISTORY:  Denies smoking, no alcohol.



REVIEW OF SYSTEMS:  Positive as above, limited.  ____.  All systems reviewed.



ALLERGIES:  FISH AND SULFA.



REVIEW OF SYSTEMS:  Positive as above.  All ____ systems reviewed.



PHYSICAL EXAMINATION: 

GENERAL:  The patient is lying on the bed, not in acute distress.

HEENT:  Atraumatic, anicteric.

NECK:  Supple.

HEART:  S1, S2 heard.

LUNGS:  Bilateral air entry present.

ABDOMEN:  Soft.  There is no tenderness.

EXTREMITIES:  No edema, no cyanosis.

NEUROLOGIC:  Alert, confused, awake.  



LABORATORY DATA:  Hemoglobin 13.8, hematocrit 40.1.  WBCs 11.3, platelets 256.  Chemistry:  BUN 23, c
reatinine 0.6, glucose 147.  



The patient had a CT of the abdomen and pelvis done.  It was reviewed, and calcified lesion noted in 
the root of the mesentery.



IMPRESSION:  This 87-year-old patient admitted with change in mental status, found to be atrial fibri
llation with rapid ventricular response.  The patient had a CT done, which showed a calcified lesion 
in the mesenteric root.  The differential diagnosis with calcified lesion noted. The etiology is uncl
ear.  The patient did have a distended abdomen.  Now the patient has a Brown catheter.



Would recommend:  The CT scan done in 11/2013 was reviewed, and that also showed calcified lesions at
 the root area.  It appears to be significantly unchanged.  There are no obvious significant change n
oticed.  Since there is more than ____ years, this lesion present, it appears to be more benign etiol
ogy than neoplastic.  However, we will review the admitting workup with radiologist and consider abou
t further evaluation after the official review addendum report from the radiologist.



Thank you very much for allowing us to participate in the care of the patient.





__________________________________________

Stef Helton MD







cc:



DD: 04/23/2017 00:14:38  [id number]

TT: 04/23/2017 08:57:27

Confirmation # [id number]

Dictation # [id number]

jn

## 2017-04-24 NOTE — CARD
--------------- APPROVED REPORT --------------





EXAM: Two-dimensional and M-mode echocardiogram with Doppler and 

color Doppler.



INDICATION

Atrial Fibrillation



2D DIMENSIONS 

IVSd1.3   (0.7-1.1cm)LVDd3.9   (3.9-5.9cm)

PWd1.2   (0.7-1.1cm)LVDs2.5   (2.5-4.0cm)

FS (%) 36.3   %LVEF (%)66.7   (>50%)



M-Mode DIMENSIONS 

Aortic Root2.70   (2.2-3.7cm)Aortic Cusp Exc.0.80   (1.5-2.0cm)



Aortic Valve

AoV Peak Ysearvcl237.0cm/sAoV VTI43.7cmAO Peak GR.18mmHg

LVOT Peak Yxyxatxb137.0cm/sLVOT VTI26.00cmAO Mean GR.9mmHg



Mitral Valve

MV E Jowsnhgh423.0cm/sMV A Vocxrwss293.0cm/sMV IYN965le

E/A ratio1.1MVA (PHT)2.06cm2



TDI

Lateral E' Peak V7.21cm/sMedial E' Peak V4.19cm/sE/Lateral E'18.6

E/Medial E'32.0



Pulmonary Valve

PV Peak Wfvpymyc76.5cm/sPV Peak Grad.2mmHg



Tricuspid Valve

TR Peak Tayhqfay944ck/sRAP UEKXJWOW15evWcRS Peak Gr.15mmHg

YIPL26kkPc



 LEFT VENTRICLE 

The left ventricle is normal size. There is mild concentric left 

ventricular hypertrophy. The left ventricular function is normal. The 

left ventricular ejection fraction is within the normal range. There 

is normal LV segmental wall motion. Transmitral Doppler flow pattern 

is Grade I-abnormal relaxation pattern.



 RIGHT VENTRICLE 

The right ventricle is normal size. There is normal right ventricular 

wall thickness. The right ventricular systolic function is normal.



 ATRIA 

The left atrium size is normal. The right atrium size is normal.



 AORTIC VALVE 

The aortic valve is mildly calcified. There is mild valvular aortic 

stenosis.



 MITRAL VALVE 

The mitral valve is moderately thickened.



 TRICUSPID VALVE 

There is no pulmonary hypertension.



 GREAT VESSELS 

The aortic root displays moderate sclerocalcific changes of the 

aortic root.



<Conclusion>

The left ventricle is normal size.

There is mild concentric left ventricular hypertrophy.

The left ventricular function is normal.

The left ventricular ejection fraction is within the normal range.

Transmitral Doppler flow pattern is Grade I-abnormal relaxation 

pattern.

The aortic valve is mildly calcified.

There is mild valvular aortic stenosis.

## 2017-04-25 LAB
ADD MANUAL DIFF?: NO
ALBUMIN/GLOB SERPL: 1.1 {RATIO} (ref 1.1–1.8)
ALP SERPL-CCNC: 62 U/L (ref 38–133)
ALT SERPL-CCNC: 48 U/L (ref 7–56)
AST SERPL-CCNC: 50 U/L (ref 15–39)
BASOPHILS # BLD AUTO: 0.05 K/MM3 (ref 0–2)
BASOPHILS NFR BLD: 0.6 % (ref 0–3)
BILIRUB SERPL-MCNC: 0.6 MG/DL (ref 0.2–1.3)
BUN SERPL-MCNC: 15 MG/DL (ref 7–21)
CALCIUM SERPL-MCNC: 8.9 MG/DL (ref 8.4–10.5)
CHLORIDE SERPL-SCNC: 103 MMOL/L (ref 95–110)
CO2 SERPL-SCNC: 28 MMOL/L (ref 21–33)
EOSINOPHIL # BLD: 0.5 10*3/UL (ref 0–0.7)
EOSINOPHIL NFR BLD: 5.3 % (ref 1.5–5)
ERYTHROCYTE [DISTWIDTH] IN BLOOD BY AUTOMATED COUNT: 12.7 % (ref 11.5–14.5)
GLOBULIN SER-MCNC: 3.1 GM/DL
GLUCOSE SERPL-MCNC: 150 MG/DL (ref 70–110)
GRANULOCYTES # BLD: 5.44 10*3/UL (ref 1.4–6.5)
GRANULOCYTES NFR BLD: 62.7 % (ref 50–68)
HCT VFR BLD CALC: 36.1 % (ref 36–48)
LYMPHOCYTES # BLD: 1.8 10*3/UL (ref 1.2–3.4)
LYMPHOCYTES NFR BLD AUTO: 20.2 % (ref 22–35)
MCH RBC QN AUTO: 30.7 PG (ref 25–35)
MCHC RBC AUTO-ENTMCNC: 33.8 G/DL (ref 31–37)
MCV RBC AUTO: 90.7 FL (ref 80–105)
MONOCYTES # BLD AUTO: 1 10*3/UL (ref 0.1–0.6)
MONOCYTES NFR BLD: 11.2 % (ref 1–6)
PLATELET # BLD: 238 10^3/UL (ref 120–450)
PMV BLD AUTO: 10.5 FL (ref 7–11)
POTASSIUM SERPL-SCNC: 3.6 MMOL/L (ref 3.6–5)
PROT SERPL-MCNC: 6.6 G/DL (ref 5.8–8.3)
SODIUM SERPL-SCNC: 140 MMOL/L (ref 132–148)
WBC # BLD AUTO: 8.7 10^3/UL (ref 4.5–11)

## 2017-04-25 RX ADMIN — ENOXAPARIN SODIUM SCH MG: 60 INJECTION SUBCUTANEOUS at 10:19

## 2017-04-25 RX ADMIN — INSULIN HUMAN SCH UNITS: 100 INJECTION, SOLUTION PARENTERAL at 10:18

## 2017-04-25 RX ADMIN — INSULIN HUMAN SCH UNITS: 100 INJECTION, SOLUTION PARENTERAL at 12:41

## 2017-04-25 RX ADMIN — INSULIN HUMAN SCH: 100 INJECTION, SOLUTION PARENTERAL at 16:30

## 2017-04-25 RX ADMIN — INSULIN HUMAN SCH: 100 INJECTION, SOLUTION PARENTERAL at 21:30

## 2017-04-25 RX ADMIN — PANTOPRAZOLE SODIUM SCH MG: 40 TABLET, DELAYED RELEASE ORAL at 10:20

## 2017-04-25 RX ADMIN — ENOXAPARIN SODIUM SCH: 60 INJECTION SUBCUTANEOUS at 21:38

## 2017-04-25 NOTE — HP
CHIEF COMPLAINT:  Altered mental status.



HISTORY OF PRESENT ILLNESS:  The patient is an 87-year-old, my private patient, 
came to the Emergency Room Department stating that she is seeing things and 
hearing things that are not there.  The patient stated that she has had these 
symptoms for at least a few weeks.  No relieving or exacerbating factors.  The 
patient states that her hallucinations are not command and she denies any 
suicidal or homicidal ideation.  The patient is actually a very educated lady.  
She is a  and she knows what she is talking, but she is hearing 
voices and seeing things.  The patient is alert to name, does not know the 
location or the date at the time of admission, but today, she knows the location
, but she does not know the date.  We admitted the patient in the unit.  
diffrent consult called, getting treatment.  The patient is improving very 
slowly.



PAST MEDICAL HISTORY:  Hypertension, cardiac disease, COPD, cerebrovascular 
accident, diabetes mellitus type 2, history of breast cancer, status post left 
mastectomy, poor appetite, history of open heart surgery, radical mastectomy.



FAMILY HISTORY:  Father and mother noncontributory.  She has only 1 brother and 
that brother has 1 daughter , her  niece.  Otherwise, she does not have any 
other relatives.



HABITS:  No smoking, no drugs, no ethanol.



ALLERGIES:  THE PATIENT IS ALLERGIC WITH FISH AND SULFA.



REVIEW OF SYSTEMS:  The patient is seen and examined on the bedside today in 
the unit, looks a little bit comfortable.  No nausea, vomiting, diarrhea.  Does 
not look like toxic.  No hematuria, no hematochezia.  No swelling of the legs.  
No headache, no dizziness.



PHYSICAL EXAMINATION:

VITAL SIGNS:  Temperature 98.5, pulse 73, blood pressure 172/72, respiratory 
rate 16.

HEENT:  Head normocephalic, atraumatic.  Eyes:  PERRLA.  Extraocular muscles 
intact.  Conjunctivae pink.  Eyelids unremarkable.  Nose patent.

NECK:  Supple.  No carotid bruit, no JVD, no thyromegaly.

CHEST:  Bilaterally symmetrical.

HEART:  S1, S2 positive.

LUNGS:  Clear to auscultation.

ABDOMEN:  Soft.  Bowel sounds positive.  No organomegaly.

EXTREMITIES:  No edema, no cyanosis.

NEUROLOGIC:  The patient is awake, alert, getting episodes of confusion, moving 
all 4 extremities.  Cranial nerves II-XII grossly intact.



LABORATORIES:  White blood cells 9.7, on admission it was 12.3, hemoglobin 12.2
, hematocrit 35.4, platelets 211.  Sodium 140, potassium 3.5, BUN 20, 
creatinine 0.6.  Glucose 204, 182, 370, 159.



ASSESSMENT AND PLAN:  The patient is an 87-year-old lady with history of 
leukocytosis, got better, hypokalemia, replaced, uncontrolled diabetes mellitus
, INR is low, has proteinuria, glucosuria, hematuria, urinary tract infection.  
Seen by Dr. Barcenas, Dr. Guerrero Iqbal and Dr. Helton.  History of atrial 
fibrillation with rapid ventricular response, noncompliant with Coumadin.  
According to her, Coumadin is giving her hallucinations and delusions.  Chronic 
obstructive lung disease, interstitial infiltrates, has abdominal mass, 
advanced dementia, hypertension.  The patient is in ICU.  The patient is put 
back on sotalol by the cardiologist and Cardizem.  Rule out aspiration 
pneumonia.  Gastric prophylaxis.  The patient is partially deaf.  Reviewed Dr. Helton's notes also.  CAT scan of the chest done.  CAT scan of the abdomen 
and pelvis done, 4 cm calcified mass in the , which is nonspecific, carcinoid 
tumor is not to be excluded.  Discussion done with patient's nurse.  Called 
psych consult and according to patient, her cardiologist is Dr. Lozano.  She 
wants to see Dr. Lozano.  We canceled consult with Dr. Guerrero Iqbal, put consult 
with Dr. Lozano.  Spoke to patient's niece, phone number [phone] or [phone]
[phone].  According to   franky , she is power of  for the patient.  
Length of time discussion done and looks like patient is not able to live alone 
by herself.  We will try to get rehab as soon as she will get better.  Otherwise
, gastrointestinal and deep venous thrombosis prophylaxis.  Repeat labs.  We 
will follow up.





__________________________________________

Jennifer Muller MD







cc:   



DD: 04/24/2017 23:23:37  [id number]

TT: 04/25/2017 08:33:59

Job # [id number]

en

MTDD

## 2017-04-25 NOTE — PN
DATE: 04/25/2017



The patient is in a chair, awake, alert, confused, but without distress.  



PHYSICAL EXAMINATION:

VITAL SIGNS:  Blood pressure is 138/86.  The heart rate is approximately 100.

NECK:  Negative JVD.

LUNGS:  Without rales.

HEART:  Reveals S1, S2 with a II/VI systolic ejection murmur.

EXTREMITIES:  Without edema.  



LABORATORY DATA:  The glucose is 150.  Hemoglobin is 12.2.



Echocardiogram reveals good LV function.  Mild aortic sclerosis is noted.



IMPRESSION:

1.  Paroxysmal atrial fibrillation.  The patient is tolerating sotalol.

2.  History of coronary artery bypass surgery.

3.  Coronary artery disease.

4.  Diabetes mellitus.

5.  Hypertension.

6.  Dementia.



PLAN:  Given these findings, we will discontinue telemetry.  We will keep her on the sotalol.  Her IN
R is at 1.05 and the patient is currently on Coumadin with adjusting PT/INR.





__________________________________________

Guerrero Iqbal MD







cc:



DD: 04/25/2017 13:27:01  [id number]

TT: 04/25/2017 13:33:41

Confirmation # [id number]

Dictation # [id number]

sn

## 2017-04-25 NOTE — PN
DATE: 04/25/2017



Seen and examined at the bedside earlier today.  The patient just finished the breakfast.  No complai
nts of nausea, vomiting or abdominal pain.  She is not sure when she last had a bowel movement.  No a
cute overnight events were reported except that earlier today she was having increase in heart rate.



VITAL SIGNS:  Temperature is 97.8, blood pressure is 138/86, heart rate is 120, respirations 20, 100%
 on room air.



LABORATORIES:  WBC 8.7, H and H is 12.2 and 36.1, platelets of 238.  Chem:  Sodium 140, K 3.6, BUN 15
, creatinine 0.6.  Total bilirubin is 0.6, AST 50, ALT 48, alkaline phosphatase is 62.



PHYSICAL EXAMINATION:

HEENT:  Sclerae anicteric.

NECK:  Supple.

CARDIAC:  S1, S2.

LUNG SOUNDS:  With decreased breath sounds, but good air entry, occasional rhonchi, no wheezing.

ABDOMEN:  With bowel sounds, soft, _____ softly distended, nontender on palpation.



ASSESSMENT:  An 87-year-old female, came with change in mental status.  The patient was having atrial
 fibrillation with rapid ventricular response.  She has history of coronary artery disease status pos
t coronary artery bypass graft, found to have an abnormal CT scan reporting calcified lesion in the m
esenteric root, although it appears that the patient's previous CAT scan 2 years ago reported this fi
nding.



PLAN:  We will give patient a dose of MiraLax.  She has not had a bowel movement in the past 2 or 3 d
ays.  She is tolerating her heart healthy diet.  We will continue Protonix.  She is on Coumadin as we
ll as on Lovenox.  She is also getting sotalol b.i.d.  Continue to follow.



The patient was seen and case discussed with Dr. Helton.





__________________________________________

Renetta MOLINA







cc:



DD: 04/25/2017 13:08:41  451

TT: 04/25/2017 13:44:38

Confirmation # [id number]

Dictation # [id number]

en

## 2017-04-25 NOTE — PN
DATE: 04/25/2017



SUBJECTIVE:  The patient was seen and examined in the unit.  Looks comfortable, but a little bit conf
used.  Kathi, friend, was on the bedside also.  The patient pulled her IV line and was not letting n
indirae staff to do another line.  Education done.  No nausea, vomiting, or diarrhea.  No fever, no chil
ls.  No headache, no dizziness.



PHYSICAL EXAMINATION:

VITAL SIGNS:  Temperature 98.1, pulse 64, respiratory rate 20, blood pressure 132/72.

HEAD:  Normocephalic, atraumatic.

Eyes:  PERRLA. Extraocular movements intact.  Conjunctivae clear.  Eyelids unremarkable.  NOSE:  Allen
nt.  Mucous membranes moist.

NECK:  Supple.  No carotid bruit, JVD or thyromegaly.

CHEST:  Bilaterally symmetrical.

HEART:  S1, S2 positive.

LUNGS:  Clear to auscultation.

ABDOMEN:  Soft.  Bowel sounds present.  No organomegaly.

EXTREMITIES:  No edema, no cyanosis.

NEUROLOGIC:  The patient is awake, moving all 4 extremities, but is confused.



MEDICATIONS:  Sotalol, Coumadin, losartan, Lovenox, MiraLax, Seroquel, NS.



LABORATORY DATA:  White blood cells 8.7, hemoglobin 12.2, hematocrit 36.1, platelets 238.  Sodium 140
, potassium 3.4, BUN 15, creatinine 0.6, glucose 264, AST 50.



ASSESSMENT AND PLAN:  The patient is an 87-year-old lady with uncontrolled diabetes mellitus, abnorma
l liver function tests, history of leukocytosis, improved.  INR is low at 1.05, so she is getting Lov
enox with her Coumadin.  The patient was not taking Coumadin at home, given she has atrial fibrillati
on, but according to her, Coumadin is bringing hallucinations and delusions.  Proteinuria, glucosuria
, ketonuria, hematuria, urinary tract infection.  RPR nonreactive.  Seen by Dr. Nancy Reilly, psy
chiatrist, because of her delusions and hallucinations, and seen by GI.  Still has change in mental s
tatus.  History of coronary artery disease, status post coronary artery stents, bypass graft.  Found 
that abnormal CAT scan, calcified lesion in the mesenteric root, although it appears that the patient
's previous CAT scan 2 years ago reported this finding.  Gastrointestinal gave patient MiraLax becaus
e we are not sure about patient's bowel movement. Continue heart healthy diet.  Continue Protonix, Co
umadin, Lovenox, getting sotalol.  Length of time discussion done with Dr. Barcenas and the patient's b
est friend, Kathi, and [facility]  Meanwhile, continu
e present treatment.  Repeat labs.  We will follow up.





__________________________________________

Jennifer Muller MD







cc:



DD: 04/25/2017 21:11:21  [id number]

TT: 04/25/2017 22:01:36

Confirmation # [id number]

Dictation # [id number]

wilfrido

## 2017-04-25 NOTE — PN
DATE: 04/24/2017



SUBJECTIVE:  This patient was seen and evaluated earlier today.  I spoke with the intensivist.  This 
87-year-old patient admitted with change of mental status, atrial fibrillation with rapid ventricular
 response.  The patient is still on Cardizem drip.



PHYSICAL EXAMINATION:

VITAL SIGNS:  Afebrile.  Blood pressure is 169/80, O2 sat is 90%, afebrile, heart rate is 73.

HEENT:  Atraumatic, anicteric.

NECK:  Supple.

HEART:  S1, S2 heard, irregular, tachycardic.

LUNGS:  Bilateral air entry present, _____ occasional rhonchi present.

EXTREMITIES:  No edema, no cyanosis.

NEUROLOGIC:  Alert, confused.

ABDOMEN:  Soft.



LABORATORY DATA:  Hemoglobin 12.2, hematocrit 35.4, WBC is 9.7, and platelets 211.  Chemistry shows L
FTs are essentially unremarkable.



IMPRESSION:  This 87-year-old patient admitted with a change in mental status.  The patient is on IV 
heparin, the patient _____ atrial fibrillation, history of coronary artery disease status CABG, diabe
bhargav mellitus, hypertension.  The patient was found to have a calcified lesion in the mesenteric root.
  It appears to be present even in the CAT scans done more than 2 years ago.  Awaiting for the radiol
ogy official addendum report.  Continue the present treatment.



Thank you very much for allowing us to participate in the care of the patient.  Reviewed with the rad
iologist.





__________________________________________

Stef Helton MD







cc:



DD: 04/24/2017 23:26:12  [id number]

TT: 04/25/2017 00:29:05

Confirmation # [id number]

Dictation # [id number]

mn

## 2017-04-25 NOTE — CON
DATE: 04/25/2017



HISTORY OF PRESENT ILLNESS:  Shortly, patient is an 87-year-old  female with history of mult
iple medical issues.  Also, patient has history of depression and history of admissions to the psychi
atric inpatient unit.  Most recent was in 12/2015 for depression.  This writer is very familiar with 
this patient from the previous admission on the medical floor.  This writer was a consultant.  The pa
delia has history of delirium and change in mental status.  Also, patient has history of visual hallu
cinations and auditory hallucinations and paranoid ideations.  The patient was convinced that it was 
related to Coumadin what she was taking before, as per previous record.



The patient was seen and examined today.  The patient remembered this writer, but does not remember t
his writer's name.  The patient said that she is not sure why she is in the hospital, but reported th
at she was seeing a dog in her apartment, that is why she called 911.  The patient reported that she 
was no dogs and she was concerned about dog to be in her apartment.  As per medical team notes, mary brooks also reported that she had impression that everybody was laughing at her.  At present moment, frank guzman denied any hallucinations, last time was last Friday, but patient is poor, unreliable historian. 
 The patient has episodes of confusion and hallucinations.



VITAL SIGNS:  This writer reviewed vital signs.  Vital signs are stable.  Temperature 98.1, pulse is 
64, blood pressure 132/72, respirations 20.



MEDICATIONS:  Reviewed.  The patient is on Lovenox, Humulin, Cozaar, Protonix.  Seroquel will be resu
med 12.5 mg at the nighttime.  The patient responded well to that dose.  The patient is also on sodiu
m chloride, sotalol, Coumadin.  



LABORATORY DATA:  The patient's labs also reviewed.  Seem to be stable at the time of admission.  The
 patient had leukocytosis, but not now.  Chemistry also reviewed.  Urinalysis showed blood trace.



MENTAL STATUS EXAMINATION:  The patient appears to be alert, knows that she is in the hospital, does 
not know what is the date.  Intermittent eye contact.  The patient described her mood to be fine.  Af
fect was irritable and annoyed by this writer.  Thought process was circumstantial.  Thought content:
  The patient has hallucinations, delusions and paranoia.  The patient denied thoughts of harming her
self or others, denied intent or plan.  Insight and judgment are limited.  Impulses are well controll
ed at the present moment.



IMPRESSION:  The patient has multiple medical issues including leukocytosis, noncompliance with the C
oumadin because patient's impression is that Coumadin is giving her hallucinations.  The patient has 
electrolyte imbalance.  The patient has diabetes.  The patient also has urinary tract infection.  The
 patient also has history of atrial fibrillation with rapid ventricular response.  The patient ____ i
s in ICU.



PLAN:  This writer reviewed previous record.  The patient responded on smaller dose of Seroquel.  It 
will be resumed 12.5 mg at the nighttime to clear delirium stage.  Collateral information needs to be
 obtained from the family.  We will definitely do so.  As per record, patient's niece has power of at
torney for the patient.  We are planning to give a call to her tomorrow, [phone] or [phone]
.  Meanwhile, continue current management.  This writer cannot exclude after medical issues will be a
ddressed, patient will start feeling better and hallucinations will be subsided.  Of note, visual chrissy
lucinations more related to either neurological problems, Lewy body dementia or delirium stage.  Plea
se consider to call neurology consult.  Case will be discussed with Dr. Muller.



Thank you very much for letting me participate in care of your patient.





__________________________________________

Nancy Reilly MD







cc:



DD: 04/25/2017 16:24:53  486

TT: 04/25/2017 16:45:45

Confirmation # [id number]

Dictation # [id number]

sn

## 2017-04-26 VITALS — TEMPERATURE: 98.6 F

## 2017-04-26 RX ADMIN — INSULIN HUMAN SCH: 100 INJECTION, SOLUTION PARENTERAL at 16:37

## 2017-04-26 RX ADMIN — INSULIN HUMAN SCH: 100 INJECTION, SOLUTION PARENTERAL at 07:47

## 2017-04-26 RX ADMIN — PANTOPRAZOLE SODIUM SCH MG: 40 TABLET, DELAYED RELEASE ORAL at 05:48

## 2017-04-26 RX ADMIN — INSULIN HUMAN SCH UNITS: 100 INJECTION, SOLUTION PARENTERAL at 12:25

## 2017-04-26 RX ADMIN — ENOXAPARIN SODIUM SCH MG: 60 INJECTION SUBCUTANEOUS at 09:51

## 2017-04-26 RX ADMIN — INSULIN HUMAN SCH: 100 INJECTION, SOLUTION PARENTERAL at 22:25

## 2017-04-26 RX ADMIN — POLYETHYLENE GLYCOL 3350 SCH: 17 POWDER, FOR SOLUTION ORAL at 11:42

## 2017-04-26 RX ADMIN — ENOXAPARIN SODIUM SCH MG: 60 INJECTION SUBCUTANEOUS at 22:30

## 2017-04-26 NOTE — PN
DATE: 04/26/2017



GI FOLLOWUP NOTE



Seen and examined at the bedside this morning.  The patient was reported to 
have episodes of confusion last night, but no reports of nausea or vomiting.  
The patient denies abdominal pain.  The patient was unaware of last bowel 
movement, was given a dose of MiraLax yesterday, and no reports of BM according 
to nursing staff, although the patient is tolerating oral intake.



VITAL SIGNS:  Temperature 98.6.  Blood pressure is 154/87, pulse 81, 
respirations 20.



LABORATORY DATA:  Labs noted were reviewed, and only noted to have POC glucose 
at 142.



PHYSICAL EXAMINATION:

HEENT:  Sclerae are anicteric.

NECK:  Supple.

CARDIAC:  S1, S2.

LUNGS:  Decreased breath sounds, but good air entry.  No rales or wheeze.

ABDOMEN:  With bowel sounds, soft, nontender on palpation.  No rebound or 
guarding.



ASSESSMENT:  This is an 87-year-old female with history of diabetes mellitus, 
admitted with atrial fibrillation with rapid ventricular response.  She has a 
history of dementia and was found to have abnormal CT scan showing 
calcification at the mesentery ____, although this lesion appears to be stable, 
as it appears that the patient had a previous CAT scan done more than 2 years 
ago with this present.  



The patient was also noted to have mildly elevated LFTs.  The patient will 
continue diet, also  continue GI prophylaxis.  She is on Protonix.  The patient 
is also on Lovenox, and is also on Coumadin although the patient refused last 
night's dose.  We will start the patient on MiraLax daily, hold for any loose 
stools.  



The patient is pending results on 5-HIAA 24-hour urine, rule out any carcinoid 
tumor.



As per medical team, the patient was seen and case discussed with Dr. Helton.





__________________________________________

Renetta MOLINA







cc:   



DD: 04/26/2017 11:15:18  451

TT: 04/26/2017 11:57:00

Confirmation # [id number]

Dictation # [id number]

jn

RISHI

## 2017-04-26 NOTE — PN
DATE: 04/26/2017



REFERRING PHYSICIAN:  Dr. Muller



She is sitting up in the bed, having lunch.  Night was unremarkable.  No headache, no rhinitis.  No n
ausea, vomiting, diarrhea, leg pain or leg swelling.



OBJECTIVE:

GENERAL:  No acute distress.

VITAL SIGNS:  Temp is 98, heart rate is 81, respiratory rate is 20, blood pressure 164/87.

HEENT:  Moist mucous membrane.  No ulcer or oral thrush noted.

NECK:  Supple.  No JVD.

LUNGS:  A few crackles.

HEART:  Irregularly irregular.

ABDOMEN:  Soft, nontender.  No organomegaly.

EXTREMITIES:  There is no edema.

NEUROLOGIC:  Awake, alert, follows simple command.



MEDICATIONS:  She is on Betapace 80 mg twice a day, Coumadin 5 mg, was not given yesterday; Cozaar 10
0 mg daily, insulin coverage, Lovenox 60 mg subQ twice a day, MiraLax 17 grams daily, Protonix 40 mg 
daily, Risperdal 0.5 mg twice a day p.r.n., Seroquel 12.5 mg at bedtime, IV fluid normal saline 100 m
L per hour.



LABORATORY DATA:  Shows blood sugar 303.



IMPRESSION AND PLAN:  Atrial fibrillation with rapid ventricular response, now has a controlled rate 
since she is on sotalol; chronic obstructive lung disease and interstitial infiltrate, abdominal calc
ified mass, hypertension, diabetes, may have Alzheimer type dementia.  Pulmonary point of view, she i
s doing well.  Will continue present care.  Keep head elevated at 45 degree.  Aspiration precaution. 
 Need to convert to Coumadin, but I believe patient is noncompliant, does not take it.  If that is th
e case, maybe she can be kept on Lovenox and sent to subacute facility.  Out of bed to chair if possi
ble.  Fall precautions.



Thank you and we will follow with you.





__________________________________________

Claire Barcenas MD







cc:



DD: 04/26/2017 15:28:48  336

TT: 04/26/2017 15:52:01

Confirmation # [id number]

Dictation # [id number]

sn

## 2017-04-26 NOTE — PN
DATE: 04/25/2017



ADDENDUM



This is an addendum to the GI progress report dictated by Renetta Valdivia NP.



The patient was seen and evaluated earlier today, tolerating the diet.



PHYSICAL EXAMINATION:

ABDOMEN:  Soft.  There is no tenderness.



This 87-year-old with a past medical history of diabetes mellitus, admitted with AFib with rapid vent
ricular response, history of dementia, was also found to calcification at the mesenteric root.  This 
lesion appears to be stable.  The patient does have only mildly elevated transaminase.  We will mannie
nue to follow.



Thank you very much for allowing us to participate in the care of the patient.





__________________________________________

Stef Helton MD







cc:



DD: 04/26/2017 00:39:14  [id number]

TT: 04/26/2017 05:36:35

Confirmation # [id number]

Dictation # [id number]

tn

## 2017-04-26 NOTE — PN
DATE: 04/26/2017



SUBJECTIVE:  The patient was seen and examined on the bedside, looks 
comfortable.  Her friend, Kira, was standing on the bedside, but the patient 
sent her outside because she needed privacy to talk to me, and she was asking 
about her mental status and she was asking me why she came into the hospital.  
Sitting comfortable.  Night was unremarkable.  No fever, no chills, no nausea, 
vomiting, or diarrhea.  No hematuria or hematochezia.  Looks like fully awake 
and alert at this moment.



PHYSICAL EXAMINATION:

VITAL SIGNS:  Temperature 98, heart rate 81, respiratory rate 20, blood 
pressure 164/87, and pulse oximeter  noted 

HEENT:  Normocephalic, atraumatic.  Extraocular muscles intact.  Conjunctivae 
are clear.  Nose patent.  Mucous membranes moist.

NECK:  Supple.  No carotid bruit, JVD or thyromegaly.

CHEST:  Bilaterally symmetrical.

HEART:  S1, S2 positive.

LUNGS:  Clear to auscultation.

ABDOMEN:  Soft.  Bowel sounds present.  No organomegaly.

EXTREMITIES:  No edema, no cyanosis.

NEUROLOGIC:  The patient is awake, alert, moving all 4 extremities.  No focal 
deficits.



MEDICATIONS:  Betapace, Coumadin, Cozaar, insulin coverage, Lovenox, MiraLax, 
Protonix, risperidone, Seroquel, 



LABORATORY DATA:  We do not have recent labs today, but I reviewed old labs.



ASSESSMENT AND PLAN:  The patient is an 87-year-old female with atrial 
fibrillation with rapid ventricular response, now is controlled since she is 
started on sotalol.  Asthma, COPD, interstitial infiltrates, abdominal 
calcified mass, hypertension, diabetes mellitus, dementia Alzheimer type, 
noncompliant, having a history of depression, sometimes having hallucinations 
and delusions.  Aspiration precautions.  The patient needs subacute 
rehabilitation.  I reviewed Dr. Barcenas's notes.  I reviewed Dr. Helton's 
notes also.  The patient's CAT scan shows that at the mesenteric root there is 
calcified lesion that looks like it is stable.  Do not  need  any further 
workup as per Dr. Helton.  Seen by cardiologist, Dr. Guerrero Iqbal's; stable 
angina, coronary artery disease, history of coronary artery bypass graft.  
Given these findings, we will stop IV fluid.  The patient is taking everything 
p.o.  Physical therapy, subacute rehabilitation.  We will follow up.





__________________________________________

Jennifer Muller MD







cc:   



DD: 04/26/2017 22:29:41  [id number]

TT: 04/26/2017 23:53:12

Confirmation # [id number]

Dictation # [id number]

mn

MTDD

## 2017-04-26 NOTE — PN
DATE: 04/26/2017



SUBJECTIVE:  The patient is confused and refuses IV and IV insertion.  The patient was evaluated by kim cobos.  



PHYSICAL EXAMINATION:

VITAL SIGNS:  Blood pressure 164/87, heart rate in the 80s.

NECK:  Negative JVD.

LUNGS:  Without rales.

HEART:  Revealed S1, S2.

EXTREMITIES:  Without edema.



LABORATORY DATA:  Hemoglobin is 12.2.  Chemistries:  Glucose is 150.



IMPRESSION:

1.  Confusion with questionable dementia.

2.  Stable angina.

3.  Coronary artery disease.

4.  History of coronary artery bypass surgery.

5.  Paroxysmal atrial fibrillation, on sotalol.

6.  Diabetes mellitus.

7.  Hypertension.



PLAN:  Given these findings, I would stop her IV fluids given that the patient is taking p.o. well ac
cording to the nurses.  No further cardiac workup is necessary.





__________________________________________

Guerrero Iqbal MD







cc:



DD: 04/26/2017 14:55:38  [id number]

TT: 04/26/2017 15:04:51

Confirmation # [id number]

Dictation # [id number]

mn

## 2017-04-26 NOTE — PN
DATE: 04/26/2017



Shortly, the patient is an 87-year-old  female with history of multiple medical issues.  The
 patient also has history of mental illness, depression, history of delirium and psychosis.  The frank
ent has history of being admitted to the psychiatric inpatient unit.  Most recent was in 2015.  The kim lewis was admitted on the medical floor for evaluation of change in mental status.  The patient was 
seeing dogs in her house, but apparently the patient does not have any dogs in the house.  The geovany tate also had A-fib and electrolyte imbalance and possible urinary tract infection.  Psych consult was brigido zepeda for evaluation of change in mental status and hallucinations.  Please see this writer's initial
 note for more detailed information.  The patient was followed up today.  The patient is argumentativ
e, was refusing to have IV line place.  The patient also is not taking psychotropic medications.  The
 patient does not have any agitation, but the patient is very hard to deal with.  From this writer's 
perspective evaluation, the patient is still psychotic.  The patient said "You are not real, why sholouie corona I talk to you?"  This writer asked why, the patient feels that this writer is not real.  The patie
nt said "you're wearing mask, I don't know you, what is your name?"  The patient is acutely psychotic
 and thought process is disorganized.  The patient feels paranoid.  This writer educated patient abou
t Seroquel and the patient had good response to that medication before.  The patient said "I'm not ta
steve it."  Besides that, the patient refused to talk further and this writer repeats herself.  The christina lin is delirious and psychotic.



This writer reviewed vital signs.  Vital signs seem to be stable.  Temperature is 98.1, pulse is 81, 
blood pressure 164/87, respirations 20.



MEDICATIONS:  Reviewed.  Lovenox, Humulin, Cozaar, Protonix, MiraLax, Seroquel 12.5 mg at the nightti
me was prescribed, but patient refused to take it.  This writer also will implement Risperdal liquid 
form as needed for agitation.



LABORATORY DATA:  Reviewed.  Seems to be no leukocytosis.  Chemistry also reviewed.  AST is 50.  Urin
alysis:  Some blood in there.  RPR is negative.  Reports reviewed, pulmonologist, Dr. Barcenas, GI team
 as well as Dr. Muller, as well as cardiologist reports reviewed.



MENTAL STATUS EXAMINATION:  The patient appears to be alert.  The patient has intense eye contact.  S
peech was underproductive, low volume, slow.  Mood described "I'm fine."  Affect was flat.  Thought p
rocess is disorganized.  Thought content:  The patient is acutely psychotic.  The patient feels that 
this writer is not real as well as this writer ____ and wears masks.  This writer obviously was not w
earing any mask.  The patient's insight and judgment are very limited.  Impulses are well controlled 
so far, but as per medical team note, the patient was not sleeping, was refusing to be transferred fr
 ICU to the medical floor and watching TV in standing position for 5 hours.



IMPRESSION:  Most likely, the patient is in delirium stage.  This writer is not sure if the patient h
as history of dementia.  The patient had visual hallucinations prior to coming to the hospital.  The 
patient was seeing dogs.  Also at present moment, the patient had impression that this writer wears m
ask and this writer is not real.  The patient appeared acutely psychotic.  These symptoms more relate
d to the neurological problems versus to delirium stage.  The patient also has multiple medical issue
s which could affect patient's mental status, including possible urinary tract infection, electrolyte
 imbalance, atrial fibrillation which is improving and also the patient has proteinuria, glucosuria. 
 The patient has coronary artery disease.  Please see medical team notes for more detailed informatio
n.



PLAN:  This writer tried to educate patient about the treatment plan, about medications and risks, be
nefits and alternatives were explained, but patient refused to be on Seroquel.  This writer will cont
inue the same medication because patient responded well on that medication and also patient will slee
p better.  In case of acute psychosis and in case of agitation, the patient would benefit from liquid
 form of Risperdal.  The patient is in behavioral control and does not have any aggression or agitati
on.  From the medical standpoint, the patient might be going to [facility].  There is no 
objection over that, but the patient needs to be seen by a psychiatrist in the nursing home within 1 
week after transfer.  Medications should be continued.  This writer will be monitoring the patient cl
osely and advise accordingly.



Thank you very much for letting me participate in care of the patient.  Should you have any questions
, give me a call back.





__________________________________________

Nancy Reilly MD







cc:



DD: 04/26/2017 14:04:36  486

TT: 04/26/2017 14:49:00

Confirmation # [id number]

Dictation # [id number]

tn

## 2017-04-26 NOTE — PN
DATE: 04/25/2017



REFERRING PHYSICIAN:  Dr. Muller.



SUBJECTIVE:  The patient is lying in the bed, friend is at bedside, nursing staff at bedside, head at
 45 degrees, feels much better.  No headaches, no rhinitis, no shortness of breath, no nausea, no vom
iting, diarrhea.  No leg pain or leg swelling.  Refusing to get IV, refused medication this morning.



OBJECTIVE:

GENERAL:  No acute distress.

VITAL SIGNS:  Temp is 98, heart rate is 72, respiratory rate is 20, blood pressure 132/72, pulse ox 9
9% nasal cannula.

HEENT:  Moist mucous membranes.  Small oral cavity.

NECK:  Supple.  No JVD.

LUNGS:  Has a few scattered rhonchi.

HEART:  Irregularly irregular.

ABDOMEN:  Soft, nontender.  No organomegaly.

EXTREMITIES:  There is no edema.

NEUROLOGIC:  Awake, alert, follows simple commands.



MEDICATIONS:  She is on Sotalol 80 mg twice a day, Coumadin 5 mg given today, Cozaar 100 mg daily, in
sulin coverage, Lovenox 60 mg subQ q. 12 hours, Protonix 40 mg daily, Seroquel 12.5 mg at bedtime, IV
 fluid normal saline 100 mL per hour.



LABORATORY DATA:  Shows hemoglobin 12.2, hematocrit 36.1, WBC 8.7, platelet is 238.  Sodium 140, pota
ssium 3.6, chloride 103, bicarbonate 28, BUN 15, creatinine 0.6, glucose 150, calcium is 8.9.  AST 50
, ALT 48, alkaline phosphatase is 62, albumin is 3.5.



IMPRESSION AND PLAN:  Atrial fibrillation with rapid ventricular response, which is much improved, st
arted Sotalol, chronic obstructive lung disease, interstitial infiltrates.  Abdominal muscles _____, 
dementia, hypertension, diabetes.  Case discussed with Dr. Muller, also spoke to friend at bedside.  
Aspiration precaution.  Keep head at 45 degrees.  She has some interstitial infiltrate on x-ray.  Lazarus
gs improved, need followup x-ray to assure the stability of her interstitial infiltrates.  Fall preca
utions.



Thank you and will follow with you.





__________________________________________

Claire Barcenas MD







cc:



DD: 04/25/2017 23:22:34  336

TT: 04/26/2017 02:48:17

Confirmation # [id number]

Dictation # [id number]

mn

## 2017-04-26 NOTE — PN
DATE: 04/26/2017



ADDENDUM



This is an addendum to the GI progress report dictated by Renetta Valdivia NP.  



The patient is comfortable, tolerating the diet, more alert now.



PHYSICAL EXAMINATION:

ABDOMEN:  Soft.  There is no tenderness.  Denies any abdominal complaints.  



The CT scan findings compared to the old CT _____ root of the mesentery calcification.  The calcifica
tion appears stable.  Would not contemplate any further GI workup, at this time for this 87-year-old 
patient with multiple other comorbidities.



Thank you very much for allowing us to participate in the care of the patient.





__________________________________________

Stef Helton MD







cc:



DD: 04/26/2017 21:39:33  [id number]

TT: 04/26/2017 21:52:54

Confirmation # [id number]

Dictation # [id number]

tn

## 2017-04-27 VITALS
DIASTOLIC BLOOD PRESSURE: 80 MMHG | OXYGEN SATURATION: 94 % | HEART RATE: 69 BPM | RESPIRATION RATE: 20 BRPM | SYSTOLIC BLOOD PRESSURE: 172 MMHG

## 2017-04-27 LAB
5-HIAA: 3.8 MG/24 H (ref ?–6)
ALBUMIN/GLOB SERPL: 1.1 {RATIO} (ref 1.1–1.8)
ALP SERPL-CCNC: 63 U/L (ref 38–133)
ALT SERPL-CCNC: 68 U/L (ref 7–56)
AST SERPL-CCNC: 61 U/L (ref 15–39)
BILIRUB SERPL-MCNC: 0.6 MG/DL (ref 0.2–1.3)
BUN SERPL-MCNC: 19 MG/DL (ref 7–21)
CALCIUM SERPL-MCNC: 9 MG/DL (ref 8.4–10.5)
CHLORIDE SERPL-SCNC: 100 MMOL/L (ref 98–107)
CO2 SERPL-SCNC: 31 MMOL/L (ref 21–33)
ERYTHROCYTE [DISTWIDTH] IN BLOOD BY AUTOMATED COUNT: 12.7 % (ref 11.5–14.5)
GLOBULIN SER-MCNC: 3.3 GM/DL
GLUCOSE SERPL-MCNC: 130 MG/DL (ref 70–110)
HCT VFR BLD CALC: 35.8 % (ref 36–48)
INR PPP: 1.3 (ref 0.93–1.08)
MCH RBC QN AUTO: 30.6 PG (ref 25–35)
MCHC RBC AUTO-ENTMCNC: 33.8 G/DL (ref 31–37)
MCV RBC AUTO: 90.6 FL (ref 80–105)
PLATELET # BLD: 226 10^3/UL (ref 120–450)
PMV BLD AUTO: 9.9 FL (ref 7–11)
POTASSIUM SERPL-SCNC: 3.6 MMOL/L (ref 3.6–5)
PROT SERPL-MCNC: 6.8 G/DL (ref 5.8–8.3)
SODIUM SERPL-SCNC: 139 MMOL/L (ref 132–148)
WBC # BLD AUTO: 6.9 10^3/UL (ref 4.5–11)

## 2017-04-27 RX ADMIN — ENOXAPARIN SODIUM SCH MG: 60 INJECTION SUBCUTANEOUS at 09:40

## 2017-04-27 RX ADMIN — ENOXAPARIN SODIUM SCH: 60 INJECTION SUBCUTANEOUS at 13:51

## 2017-04-27 RX ADMIN — INSULIN HUMAN SCH: 100 INJECTION, SOLUTION PARENTERAL at 09:40

## 2017-04-27 RX ADMIN — INSULIN HUMAN SCH: 100 INJECTION, SOLUTION PARENTERAL at 13:51

## 2017-04-27 RX ADMIN — POLYETHYLENE GLYCOL 3350 SCH GM: 17 POWDER, FOR SOLUTION ORAL at 09:41

## 2017-04-27 RX ADMIN — INSULIN HUMAN SCH: 100 INJECTION, SOLUTION PARENTERAL at 17:23

## 2017-04-27 RX ADMIN — PANTOPRAZOLE SODIUM SCH MG: 40 TABLET, DELAYED RELEASE ORAL at 06:08

## 2017-04-27 NOTE — PN
DATE: 04/27/2017



REFERRING PHYSICIAN: Dr. Muller 



SUBJECTIVE:  She is sitting in a chair, night was unremarkable, being transferred to subacute for con
tinued care.  No headache, no rhinitis, no nausea, no vomiting, no diarrhea.  No leg pain or leg swel
ling.  Gets short of breath with exertion.



OBJECTIVE:

GENERAL:  No acute distress.

VITAL SIGNS:  Temp is 98, heart rate is 60, respiratory rate is 20, blood pressure 172/80, pulse ox 9
4% on nasal cannula.

HEENT:  Moist mucous membrane.  Crowded airway.

NECK:  Supple, no JVD.

LUNGS:  Have a fair airflow with few rhonchi.

HEART:  S1, S2 irregular.

ABDOMEN:  Soft, nontender.  No organomegaly.

EXTREMITIES:  There is no edema.

NEUROLOGIC:  Awake, alert, follows simple command.



MEDICATIONS:  Reviewed.  No new medication reported since yesterday.



LABORATORY DATA:  Reviewed.  Hemoglobin is 12.1, hematocrit 35.8, WBC 6.9, and platelet is 226.  INR 
1.30.  Sodium 139, potassium _____, chloride 100, bicarbonate 31, BUN 19, creatinine 0.7, _____, gluc
ose is 130, calcium 9.0, AST 61, ALT 68, alkaline phosphatase is 63, albumin is 3.5.



MICROBIOLOGY:  Blood culture, urine culture, nose culture with no growth.



IMPRESSION AND PLAN:  Atrial fibrillation with rapid ventricular response, presently controlled rate,
 chronic interstitial lung disease, history of abdominal calcified mass, hypertension, diabetes, Alzh
eimer's type dementia.  Pulmonary point of view, doing okay.  Will need a followup x-ray to assure th
e stability of interstitial infiltrate.  Fall precaution on anticoagulation, being transferred to sub
acute for continued care.





__________________________________________

Claire Barcenas MD







cc:



DD: 04/27/2017 22:35:12  336

TT: 04/27/2017 23:48:59

Confirmation # [id number]

Dictation # [id number]

ln

## 2017-04-27 NOTE — PN
DATE: 04/27/2017



Shortly, the patient is an 87-year-old  female with history of paranoia.  The patient has mu
ltiple medical issues.  The patient was admitted on the medical floor for hallucinations.  Also, reji
ge in mental status.  Psych consult was called for the same reason.  The patient is familiar to this 
writer from the previous admissions on the medical floor as a consultant.  The patient was followed u
p today and discussed with the nursing staff.  As per nursing staff, the patient was compliant with t
he medication and had a good night's sleep.  The patient was seen today.  The patient is still psycho
tic.  The patient said that this writer is a good copy of what she saw on TV.  The patient said that 
she wants to go home and she does not want to go to rehab.  From this writer's perspective, patient l
acks capacity to make decision about disposition plan at present moment and patient's niece, the frank
ent's POA, wants patient to go to rehab.  The patient could be argumentative, irritable, but there is
 no physical aggression.



VITAL SIGNS:  Reviewed.  Temperature 98.6, pulse is 69, blood pressure 172/80, respirations 20, oxyge
n saturation 94.



MEDICATIONS:  Reviewed.  Lovenox, Humulin, Cozaar, Protonix, MiraLax, Seroquel was 12.5 mg at the nig
httime.  This writer will increase that to 25 mg at the nighttime. Risperdal 0.5 mg as needed for christopher
tation and psychosis, and Coumadin.



LABORATORY DATA:  Reviewed.  Most recent was from today.



MENTAL STATUS EXAMINATION:  The patient was alert, oriented to place as well as time.  The patient se
ems to be comfortable.  Intense eye contact.  Speech today was loud.  Mood described, "I am fine. I t
hought I am in the [place].  What I mean, that I thought I had freedom to go anywhere I wanted.
"  Mood was irritable. Affect was mood congruent.  Thought process is circumstantial.  Thought conten
t:  The patient denied visual, auditory, or tactile hallucinations, but obviously the patient is para
noid and thinks that this writer is not real.  Insight and judgment are limited.  Impulses are well p
redictable.



IMPRESSION:  Most likely the patient is in delirium stage.  It will take a while for delirium to jovany
r up.  This writer had impression after medical issues would be resolved, patient will start feeling 
better.  The patient has hypertension, non-insulin dependent diabetes mellitus, chronic obstructive p
ulmonary disease, coronary artery disease, atrial fibrillation, breast cancer, status post mastectomy
 and chemotherapy.  This writer is not sure about dementia because patient is alert and oriented, kno
ws the circumstances of her admission.



PLAN:  This writer will increase the dose of Seroquel to 25 mg at the nighttime.  Continue current ma
nagement.  POA needs to be involved.  If POA wants patient to go to rehab, there is no objection over
 that.  Will monitor patient closely.  There is no physical aggression or agitation.  If patient will
 stay overnight, we will follow up on this patient tomorrow.  We will consider transferring patient t
o the psychiatric inpatient unit if patient's POA will be in agreement with that.  Should you have an
y questions, give me a call back.  P.r.n. medications are in the computer.



Thank you very much for letting me participate in the care of your patient.





__________________________________________

Nancy Reilly MD







cc:



DD: 04/27/2017 14:51:54  486

TT: 04/27/2017 15:19:46

Confirmation # [id number]

Dictation # [id number]

[email or web address]

## 2017-04-28 NOTE — PN
DATE: 04/27/2017



Seen and examined at the bedside earlier this morning.  The patient was 
reported to have 2 bowel movements yesterday.  The patient remains confused, 
but no reports of nausea, vomiting, abdominal pain.  The patient is tolerating 
oral intake.



VITAL SIGNS:  Temperature 98.6, blood pressure is 172/80, pulse 69, 
respirations 20, 94 O2 saturation.



LABORATORY DATA:  WBC 6.9, H and H is 12.1 and 35.8, platelets 226.  PT 14.0, 
INR is 1.30.  Sodium 139, K is 3.6, BUN 19, creatinine 0.7.  Total bilirubin is 
0.6, AST 61, ALT 68.  Hepatitis panel was negative.



PHYSICAL EXAMINATION:

HEENT:  Sclerae anicteric.

NECK:  Supple.

CARDIAC:  S1, S2.

LUNGS:  Decreased breath sounds but good air entry, no rales or wheeze.

ABDOMEN:  With bowel sounds, soft, nontender, no organomegaly.

EXTREMITIES:  No edema.



ASSESSMENT:  An 87-year-old female with atrial fibrillation with rapid 
ventricular rate now controlled.  The patient reported to have abdominal mass, 
has calcification messenteric root, history of Alzheimer's dementia, diabetes 
mellitus, chronic constipation.  The patient is noted to have elevated AST, 
ALT.  Hepatitis panel was negative.  The patient refused to go for abdominal 
ultrasound.  Had old CT scan with calcification reported previously and 
remained stable.  No further GI workup is planned for this patient with 
multiple comorbidities.  The patient is planned to be sent back to [facility].  The patient was seen and case discussed with nursing staff and also 
patient was seen and discussed with Dr. Helton.





__________________________________________

Renetta MOLINA







cc:   



DD: 04/27/2017 17:26:56  451

TT: 04/27/2017 18:14:43

Confirmation # [id number]

Dictation # [id number]

jn

RISHI

## 2017-05-08 NOTE — DS
The patient was admitted on 4/21/17 to [facility] Emergency Room and 
discharged to [facility] on 4/27/2017.



CHIEF COMPLAINT:  Altered mental status.



HISTORY OF PRESENT ILLNESS:  The patient is an 87-year-old lady, my private 
patient who came to the Emergency Room because she is seeing things and hearing 
things that are not there.  The patient stated that she has had these symptoms 
for at least a few weeks.  No relieving or exacerbating factors.  The patient 
states that she is depressed and she denies any suicidal or homicidal ideation, 
but the patient is 87 years old,  advance  dementia, living alone in her 
apartment , friends  are helping her at home .  The patient was refusing a 
homemaker/visiting nurse.  She has only 1 living brother who is 93 living far 
away and she has Hillary cheney, who is a power of  for the patient.  We 
admitted the patient and did a CAT scan of the head and a CAT scan of abdomen 
and pelvis.  A consult was called to see the psychiatrist.  dr. Nancy Valera  saw the patient, Dr. Barcenas saw the patient and cardiologist 
also saw the patient.  The patient improved  and is still not able to do her 
adl .  Transferred the patient to [facility].  The patient is very 
noncompliant with office visits and especially medications.  She is taking her 
Coumadin, is giving her hallucinations and she is not taking medicines 
appropriately.



PAST MEDICAL HISTORY:  Hypertension, cardiac disease, COPD,  diabetes mellitus 
type 2, history of breast cancer status post left mastectomy .



FAMILY HISTORY:   noncontributory.



HABITS:  No smoking, no drugs , no ethnol 



ALLERGIES:  THE PATIENT IS ALLERGIC TO FISH AND SULFA.



REVIEW OF SYSTEMS:  The patient is seen and examined at the bedside on 4/27/17 
and looks comfortable, a lot better.  No nausea, vomiting or diarrhea.  No  
hematochezia.  No headache, no dizziness.  No chest pain, no palpitations.  
Gets shortness of breath with exertion.



PHYSICAL EXAMINATION:

VITAL SIGNS:  Temperature 98, heart rate 60, respiration 20, blood pressure 170/
80, pulse 94% on nasal cannula/

HEAD:  Normocephalic, atraumatic.

EYES:  PERRLA.   Conjunctiva clear.

NOSE:  Patent.

NECK:  Supple.   No thyromegaly.

CHEST:  Bilateral  symetricle 

HEART:  S1, S2 positive.

LUNGS:  Clear to auscultation.

ABDOMEN:  Soft.  Bowel sounds present.  No organomegaly.

EXTREMITIES:  No edema, no cyanosis.

NEUROLOGIC:  moving all extremities.  No focal deficits, but getting episodes 
of confusion.



LABORATORY DATA:  Hemoglobin 12.1, hematocrit 35.8,  platelets 226.  INR 1.30.  
Sodium 139.   BUN 19, creatinine 0.7, glucose 130, AST 61, ALT 68.



MEDICATIONS:  Betapace, Coumadin, Cozaar, insulin coverage, Lovenox, MiraLax, 
Protonix,  Seroquel.  



ASSESSMENT AND PLAN:  The patient is an 87-year-old lady with multiple medical 
problems, atrial fibrillation,  now is in sinus rythem  she is started on 
sotalol, asthma, chronic obstructive pulmonary disease, interstitial infiltrates
, abdominal calcified mass, hypertension, diabetes mellitus, dementia, 
noncompliant,  had depression , aspiration precautions, chronic obstructive 
pulmonary disease, history of anemia, is seen by Dr. Helton and Dr. Barcenas, 
and by the cardiologist , had  coronary artery disease, coronary artery bypass 
graft, noncompliant with medicines,   Transferred to [facility].  
Will continue treatment.





__________________________________________

Jennifer Muller MD







cc:   



DD: 05/06/2017 16:26:06  [id number]

TT: 05/06/2017 21:17:10

Job # [id number]

aniya BLACKWELL

## 2017-05-10 ENCOUNTER — HOSPITAL ENCOUNTER (INPATIENT)
Dept: HOSPITAL 42 - ED | Age: 82
LOS: 2 days | Discharge: NURSING, REHAB OR LONG-TERM CARE | DRG: 92 | End: 2017-05-12
Attending: INTERNAL MEDICINE | Admitting: INTERNAL MEDICINE
Payer: MEDICARE

## 2017-05-10 VITALS — HEART RATE: 64 BPM

## 2017-05-10 VITALS — BODY MASS INDEX: 22.1 KG/M2

## 2017-05-10 DIAGNOSIS — I25.10: ICD-10-CM

## 2017-05-10 DIAGNOSIS — Z79.899: ICD-10-CM

## 2017-05-10 DIAGNOSIS — J84.9: ICD-10-CM

## 2017-05-10 DIAGNOSIS — Z86.73: ICD-10-CM

## 2017-05-10 DIAGNOSIS — Z95.1: ICD-10-CM

## 2017-05-10 DIAGNOSIS — I48.91: ICD-10-CM

## 2017-05-10 DIAGNOSIS — F02.80: ICD-10-CM

## 2017-05-10 DIAGNOSIS — I10: ICD-10-CM

## 2017-05-10 DIAGNOSIS — J44.9: ICD-10-CM

## 2017-05-10 DIAGNOSIS — E11.9: ICD-10-CM

## 2017-05-10 DIAGNOSIS — R56.9: ICD-10-CM

## 2017-05-10 DIAGNOSIS — Z88.2: ICD-10-CM

## 2017-05-10 DIAGNOSIS — Z66: ICD-10-CM

## 2017-05-10 DIAGNOSIS — Z90.13: ICD-10-CM
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
## 2017-05-10 DIAGNOSIS — G92: Primary | ICD-10-CM

## 2017-05-10 DIAGNOSIS — Z85.3: ICD-10-CM

## 2017-05-10 DIAGNOSIS — I44.0: ICD-10-CM
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
## 2017-05-10 DIAGNOSIS — H91.90: ICD-10-CM

## 2017-05-10 DIAGNOSIS — E78.00: ICD-10-CM

## 2017-05-10 DIAGNOSIS — Z95.2: ICD-10-CM

## 2017-05-10 DIAGNOSIS — E78.5: ICD-10-CM

## 2017-05-10 DIAGNOSIS — R41.0: ICD-10-CM

## 2017-05-10 DIAGNOSIS — G30.9: ICD-10-CM

## 2017-05-10 DIAGNOSIS — Z79.01: ICD-10-CM

## 2017-05-10 DIAGNOSIS — Z91.013: ICD-10-CM

## 2017-05-10 DIAGNOSIS — R44.1: ICD-10-CM

## 2017-05-10 DIAGNOSIS — F22: ICD-10-CM

## 2017-05-10 DIAGNOSIS — S09.90XA: ICD-10-CM

## 2017-05-10 LAB
ADD MANUAL DIFF?: NO
ALBUMIN/GLOB SERPL: 1.1 {RATIO} (ref 1.1–1.8)
ALP SERPL-CCNC: 68 U/L (ref 38–133)
ALT SERPL-CCNC: 41 U/L (ref 7–56)
APTT BLD: 24.7 SECONDS (ref 23.7–30.8)
AST SERPL-CCNC: 30 U/L (ref 15–39)
BASE EXCESS BLDV CALC-SCNC: 3.6 MMOL/L (ref 0–2)
BASOPHILS # BLD AUTO: 0.07 K/MM3 (ref 0–2)
BASOPHILS NFR BLD: 0.7 % (ref 0–3)
BILIRUB SERPL-MCNC: 0.8 MG/DL (ref 0.2–1.3)
BUN SERPL-MCNC: 18 MG/DL (ref 7–21)
CALCIUM SERPL-MCNC: 9.3 MG/DL (ref 8.4–10.5)
CHLORIDE SERPL-SCNC: 96 MMOL/L (ref 98–107)
CHOLEST SERPL-MCNC: 204 MG/DL (ref 130–200)
CO2 SERPL-SCNC: 31 MMOL/L (ref 21–33)
EOSINOPHIL # BLD: 0.1 10*3/UL (ref 0–0.7)
EOSINOPHIL NFR BLD: 0.9 % (ref 1.5–5)
ERYTHROCYTE [DISTWIDTH] IN BLOOD BY AUTOMATED COUNT: 12.6 % (ref 11.5–14.5)
GLOBULIN SER-MCNC: 3.5 GM/DL
GLUCOSE SERPL-MCNC: 263 MG/DL (ref 70–110)
GRANULOCYTES # BLD: 8.35 10*3/UL (ref 1.4–6.5)
GRANULOCYTES NFR BLD: 80.2 % (ref 50–68)
HCT VFR BLD CALC: 39.1 % (ref 36–48)
INR PPP: 1.06 (ref 0.93–1.08)
LYMPHOCYTES # BLD: 1.3 10*3/UL (ref 1.2–3.4)
LYMPHOCYTES NFR BLD AUTO: 12.1 % (ref 22–35)
MCH RBC QN AUTO: 30.7 PG (ref 25–35)
MCHC RBC AUTO-ENTMCNC: 34 G/DL (ref 31–37)
MCV RBC AUTO: 90.3 FL (ref 80–105)
MONOCYTES # BLD AUTO: 0.6 10*3/UL (ref 0.1–0.6)
MONOCYTES NFR BLD: 6.1 % (ref 1–6)
PH BLDV: 7.33 [PH] (ref 7.32–7.43)
PLATELET # BLD: 233 10^3/UL (ref 120–450)
PMV BLD AUTO: 10.2 FL (ref 7–11)
POTASSIUM SERPL-SCNC: 4.3 MMOL/L (ref 3.6–5)
PROT SERPL-MCNC: 7.3 G/DL (ref 5.8–8.3)
SODIUM SERPL-SCNC: 136 MMOL/L (ref 132–148)
TROPONIN I SERPL-MCNC: 0.09 NG/ML
WBC # BLD AUTO: 10.4 10^3/UL (ref 4.5–11)

## 2017-05-10 NOTE — CT
PROCEDURE:  CT HEAD WITHOUT CONTRAST.



HISTORY:

AMS r/o ICH; on coumadin



COMPARISON:

04/21/2017 



TECHNIQUE:

Axial computed tomography images were obtained through the head/brain 

without intravenous contrast.  



Radiation dose:



Total exam DLP = 688 mGy-cm.



This CT exam was performed using one or more of the following dose 

reduction techniques: Automated exposure control, adjustment of the 

mA and/or kV according to patient size, and/or use of iterative 

reconstruction technique.



FINDINGS:



HEMORRHAGE:

No intracranial hemorrhage. 



BRAIN:

No mass effect or edema.  Chronic microvascular changes are seen in 

the periventricular white matter.



VENTRICLES:

Unremarkable. No hydrocephalus. 



CALVARIUM:

Unremarkable.



PARANASAL SINUSES:

Unremarkable as visualized. No significant inflammatory changes.



MASTOID AIR CELLS:

Unremarkable as visualized. No inflammatory changes.



OTHER FINDINGS:

None.



IMPRESSION:

Chronic microvascular changes.  No acute intracranial findings

## 2017-05-10 NOTE — CP.PCM.CON
History of Present Illness





- History of Present Illness


History of Present Illness: 





Infectious Disease Consultation:


May 10, 2017





86 yo female presenting with fall at [facility].  

Patient is a poor historian.  No major complaints by the patient at this time.  

No fevers or chills reported.  She states she was conscious during the fall.  

She has an extensive medical history that includes HTN, NIDDM, COPD, CAD, 

Atrial fibrillation, Dementia, and history of breast cancer.





PMHx:


HTN, NIDDM, COPD, CAD, Atrial fibrillation, Dementia, and history of breast 

cancer





PSHx:


Double mastectomy, CABG, "valve replacement"





Allergies:


FISH, Sulfa





Social Hx:


No tobacco, EtOH, or illicit drug use





Active Medications





Donepezil HCl (Aricept)  5 mg PO DAILY JOSHUA


Sodium Chloride (Sodium Chloride 0.9%)  1,000 mls @ 100 mls/hr IV .Q10H JOSHUA


   Last Admin: 05/10/17 16:44 Dose:  100 mls/hr


Losartan Potassium (Cozaar)  100 mg PO DAILY JOSHUA


Pantoprazole Sodium (Protonix Ec Tab)  40 mg PO DAILY JOSHUA


Polyethylene Glycol (Miralax)  17 gm PO DAILY JOSHUA


Quetiapine Fumarate (Seroquel)  50 mg PO HS JOSHUA


   PRN Reason: Protocol


Risperidone (Risperdal Tab)  0.25 mg PO BID JOSHUA


   PRN Reason: Protocol


Sotalol HCl (Betapace)  80 mg PO BID JOSHUA


Warfarin Sodium (Coumadin)  3 mg PO 1800 JOSHUA


   PRN Reason: Protocol





Family Hx:


none given





ROS:


No fevers, chills, nausea, vomiting ,diarrhea, headaches, dizziness, chest pain

, melena, hematuria, hematemesis, hematochezia, depression, anxiety, vision loss

, hearing loss.  Patient with baseline dementia and confusion.





Past Patient History





- Infectious Disease


Hx of Infectious Diseases: None





- Tetanus Immunizations


Tetanus Immunization: Unknown





- Past Social History


Smoking Status: Never Smoked





- CARDIAC


Hx Cardiac Disorders: Yes


Hx Hypertension: Yes





- PULMONARY


Hx Chronic Obstructive Pulmonary Disease (COPD): Yes





- NEUROLOGICAL


HX Cerebrovascular Accident: Yes





- HEENT


Hx HEENT Problems: Yes ([place])





- RENAL


Hx Chronic Kidney Disease: No





- ENDOCRINE/METABOLIC


Hx Diabetes Mellitus Type 2: Yes





- HEMATOLOGICAL/ONCOLOGICAL


Hx Cancer: Yes (breast)





- INTEGUMENTARY


Hx Dermatological Problems: No





- MUSCULOSKELETAL/RHEUMATOLOGICAL


Hx Falls: Yes





- GASTROINTESTINAL


Hx Gastrointestinal Disorders: Yes (POOR APPETTITE,)





- GENITOURINARY/GYNECOLOGICAL


Hx Genitourinary Disorders: No


Hx Reproductive Disorders: No





- PSYCHIATRIC


Hx Psychophysiologic Disorder: No


Hx Substance Use: No





- SURGICAL HISTORY


Hx Open Heart Surgery: Yes


Other/Comment: Radical mastectomy.





- ANESTHESIA


Hx Anesthesia: Yes


Hx Anesthesia Reactions: No


Hx Malignant Hyperthermia: No





Meds


Allergies/Adverse Reactions: 


 Allergies











Allergy/AdvReac Type Severity Reaction Status Date / Time


 


FISH Allergy  NAUSEA Verified 05/10/17 11:51


 


Sulfa (Sulfonamide Allergy  HEADACHE Verified 05/10/17 11:51





Antibiotics)     














- Medications


Medications: 


 Current Medications





Donepezil HCl (Aricept)  5 mg PO DAILY JOSHUA


Sodium Chloride (Sodium Chloride 0.9%)  1,000 mls @ 100 mls/hr IV .Q10H JOSHUA


   Last Admin: 05/10/17 16:44 Dose:  100 mls/hr


Losartan Potassium (Cozaar)  100 mg PO DAILY JOSHUA


Pantoprazole Sodium (Protonix Ec Tab)  40 mg PO DAILY JOSHUA


Polyethylene Glycol (Miralax)  17 gm PO DAILY JOSHUA


Quetiapine Fumarate (Seroquel)  50 mg PO HS JOSHUA


   PRN Reason: Protocol


Risperidone (Risperdal Tab)  0.25 mg PO BID JOSHUA


   PRN Reason: Protocol


Sotalol HCl (Betapace)  80 mg PO BID JOSHUA


Warfarin Sodium (Coumadin)  3 mg PO 1800 JOSHUA


   PRN Reason: Protocol











Physical Exam





- Constitutional


Appears: Non-toxic, No Acute Distress, Chronically Ill





- Head Exam


Head Exam: ATRAUMATIC, NORMOCEPHALIC





- Eye Exam


Eye Exam: EOMI, PERRL


Pupil Exam: NORMAL ACCOMODATION, PERRL





- ENT Exam


ENT Exam: Mucous Membranes Moist, TM's Normal Bilaterally





- Respiratory Exam


Respiratory Exam: Clear to Auscultation Bilateral, NORMAL BREATHING PATTERN.  

absent: Rales, Rhonchi, Wheezes





- Cardiovascular Exam


Cardiovascular Exam: REGULAR RHYTHM, RRR, +S1, +S2





- GI/Abdominal Exam


GI & Abdominal Exam: Normal Bowel Sounds, Soft.  absent: Distended, Tenderness





- Extremities Exam


Extremities exam: Positive for: full ROM, normal inspection





- Neurological Exam


Neurological exam: Alert, CN II-XII Intact, Oriented x3





- Psychiatric Exam


Psychiatric exam: Normal Affect, Normal Mood





- Skin


Skin Exam: Intact, Normal Color





Results





- Vital Signs


Recent Vital Signs: 


 Last Vital Signs











Temp  98 F   05/10/17 17:18


 


Pulse  109 H  05/10/17 17:40


 


Resp  16   05/10/17 17:18


 


BP  126/66   05/10/17 17:40


 


Pulse Ox  95   05/10/17 16:36














- Labs


Result Diagrams: 


 05/10/17 12:15





 05/10/17 12:15





Assessment & Plan





- Assessment and Plan (Free Text)


Assessment: 





86 yo female with fall at nursing facility.  No documented head trauma.  No 

fevers or leukocytosis.  Supportive care.  Not currently on IV antibiotics.  

Would check urinalysis.  Chest X-ray showing chronic interstitial changes.  CT 

Head showing no acute intracranial findings.  Check urinalysis before 

consideration of antibiotic use at this point.  Supportive care.





Thank you for allowing me to participate in the care of the patient, we will 

follow with you.

## 2017-05-10 NOTE — CON
DATE: 05/10/2017



HISTORY OF PRESENT ILLNESS:  This is an 87-year-old white female with past medical history of hyperte
nsion and hyperlipidemia.  The patient is a resident of [facility], fell, and came here, 
hit her head.  No loss of consciousness.  Called to evaluate the patient.  CAT scan of the head was d
one, which was reported as no bleed.  The patient is a poor historian, able to show me 2 fingers.



PAST MEDICAL HISTORY:  Hypertension and also diabetes.



ALLERGIES:  FISH AND SULFONAMIDE, SULFA.



HOME MEDICATIONS:  Aricept, Seroquel and Risperdal.



REVIEW OF SYSTEMS:  A 10-point review of system was negative, except patient is more drowsy and tearf
ul.



PHYSICAL EXAMINATION:

HEENT:  Normocephalic, atraumatic.

NECK:  Supple.

NEUROLOGIC:  Awake, drowsy.  Cranial nerves II through XII were tested.  Pupils reactive.  Spontaneou
s movement of the extremities noted.  Deep tendon reflexes 1+.  Both plantars are downgoing.  Sensory
 appears intact.  Cerebellar, gait deferred.



IMPRESSION AND PLAN:  Encephalopathy and possibly toxic metabolic.  Rule out seizure, though less lik
ely.  We will do EEG in the morning and followup.





__________________________________________

Korey Valera MD







cc:



DD: 05/10/2017 18:30:48  [id number]

TT: 05/10/2017 19:22:19

Confirmation # [id number]

Dictation # [id number]

[email or web address]

## 2017-05-10 NOTE — RAD
PROCEDURE:  Radiographs of the pelvis.



HISTORY:

fall r/o fx



COMPARISON:

None.



FINDINGS:



BONES:

Pelvic Bones: No fracture seen. Vague  left sacroiliac sclerosis



Hips: No fracture dislocation. Bilateral osteoarthrosis



JOINTS:

Sacroiliac Joints: As above



Pubic Symphysis: Joint space narrowing with mild osseous hypertrophy 

-degenerative changes



OTHER FINDINGS:

A amorphous coalescent calcification and/or ossification bordering 

the left L4 vertebral body



IMPRESSION:

No fracture or dislocation appreciated. If symptoms persist/warrant 

consider CT or MRI for more sensitive evaluation.

## 2017-05-10 NOTE — ED PDOC
Arrival/HPI





- General


Chief Complaint: Trauma


Time Seen by Provider: 05/10/17 11:42


Historian: Patient, Other (nurse- triage)





- History of Present Illness


Narrative History of Present Illness (Text): 


05/10/17 12:15


An 87 year old female h/o Afib (not on coumadin; previous note says she refuses)

, HTN, HLD presents to the emergency department from [facility] 

after a fall today. Nurse reported patient fell today and hit her head. Patient 

reports she fell last night and does not remember hitting her head. She also 

reports she wasn't unconscious during the fall. Patient denies any chest pain, 

shortness of breath, neck pain, dizziness or any other complaints at this time. 

Patient is drowsy but alert and oriented x 3. Thought she is a poor historian.





PMD: Dr. Muller


Symptom Onset: Sudden


Activities at Onset: Rest


Modifying Factors (Text): 


none


Context: Other ([facility])


Associated Symptoms (Text): 


none





Past Medical History





- Provider Review


Nursing Documentation Reviewed: Yes





- Infectious Disease


Hx of Infectious Diseases: None





- Tetanus Immunization


Tetanus Immunization: Unknown





- Reproductive


Menopause: Yes





- Cardiac


Hx Cardiac Disorders: Yes


Hx Hypertension: Yes





- Pulmonary


Hx Chronic Obstructive Pulmonary Disease (COPD): Yes





- Neurological


HX Cerebrovascular Accident: Yes





- HEENT


Hx HEENT Disorder: Yes ([place])





- Renal


Hx Renal Disorder: No





- Endocrine/Metabolic


Hx Diabetes Mellitus Type 2: Yes





- Hematological/Oncological


Hx Cancer: Yes (breast)





- Integumentary


Hx Dermatological Disorder: No





- Musculoskeletal/Rheumatological


Hx Falls: Yes





- Gastrointestinal


Hx Gastrointestinal Disorders: Yes (POOR APPETTITE,)





- Genitourinary/Gynecological


Hx Genitourinary Disorders: No


Hx Reproductive Disorders: No





- Psychiatric


Hx Psychophysiologic Disorder: No


Hx Substance Use: No





- Surgical History


Hx Open Heart Surgery: Yes


Other/Comment: Radical mastectomy.





- Anesthesia


Hx Anesthesia: Yes


Hx Anesthesia Reactions: No


Hx Malignant Hyperthermia: No





- Suicidal Assessment


Feels Threatened In Home Enviroment: No





Family/Social History





- Physician Review


Nursing Documentation Reviewed: Yes


Family/Social History: No Known Family HX


Smoking Status: Never Smoked


Hx Alcohol Use: No


Hx Substance Use: No


Hx Substance Use Treatment: No





Allergies/Home Meds


Allergies/Adverse Reactions: 


Allergies





FISH Allergy (Verified 05/10/17 11:51)


 NAUSEA


Sulfa (Sulfonamide Antibiotics) Allergy (Verified 05/10/17 11:51)


 HEADACHE








Home Medications: 


 Home Meds











 Medication  Instructions  Recorded  Confirmed


 


Donepezil HCl [Aricept] 5 mg PO DAILY 05/10/17 05/10/17


 


Pantoprazole Sodium [Protonix] 40 mg PO DAILY 05/10/17 05/10/17


 


Polyethylene Glycol 3350 [Miralax] 1 packet PO DAILY 05/10/17 05/10/17


 


QUEtiapine [Seroquel] 50 mg PO HS 05/10/17 05/10/17


 


Risperidone [Risperdal] 0.25 mg PO BID 05/10/17 05/10/17


 


Risperidone [Risperdal] 0.5 mg PO PRN PRN 05/10/17 05/10/17














Review of Systems





- Physician Review


All systems were reviewed & negative as marked: Yes





- Review of Systems


Constitutional: Normal


Eyes: Normal


ENT: Normal


Respiratory: absent: SOB


Cardiovascular: absent: Chest Pain


Gastrointestinal: Normal


Genitourinary Female: Normal


Musculoskeletal: absent: Neck Pain


Skin: Normal


Neurological: absent: Dizziness


Endocrine: Normal


Hemo/Lymphatic: Normal


Psychiatric: Normal





Physical Exam


Vital Signs Reviewed: Yes


Vital Signs











  Temp Pulse Pulse Resp BP Pulse Ox


 


 05/10/17 17:40   109 H    126/66 


 


 05/10/17 17:35   109 H    123/66 


 


 05/10/17 17:18  98 F  132 H  132 H  16  139/83 


 


 05/10/17 17:08  98 F  132 H  132 H  16  139/83 


 


 05/10/17 16:45   132 H    139/83 


 


 05/10/17 16:36  98.0 F  134 H   16  134/83  95


 


 05/10/17 11:40  98.8 F  73   16  160/72 H  95











Temperature: Afebrile


Blood Pressure: Hypertensive


Pulse: Regular


Respiratory Rate: Normal


Appearance: Positive for: Well-Appearing, Non-Toxic, Comfortable


Pain Distress: None


Mental Status: Positive for: Alert and Oriented X 3


Finger Stick Blood Glucose: 302





- Systems Exam


Head: Present: Atraumatic, Normocephalic


Pupils: Present: PERRL


Extroacular Muscles: Present: EOMI


Conjunctiva: Present: Normal


Mouth: Present: Moist Mucous Membranes


Neck: Present: Normal Range of Motion


Respiratory/Chest: Present: Clear to Auscultation, Good Air Exchange.  No: 

Respiratory Distress, Accessory Muscle Use


Cardiovascular: Present: Regular Rate and Rhythm, Normal S1, S2.  No: Murmurs


Abdomen: Present: Normal Bowel Sounds.  No: Tenderness, Distention, Peritoneal 

Signs


Breast/Axillary: Present: Other (double mastectomy)


Back: Present: Normal Inspection


Upper Extremity: Present: Normal Inspection.  No: Cyanosis, Edema


Lower Extremity: Present: Normal Inspection.  No: Edema


Neurological: Present: GCS=15, CN II-XII Intact, Speech Normal


Skin: Present: Warm, Dry, Normal Color.  No: Rashes


Psychiatric: Present: Alert, Oriented x 3, Other (drowsy)





Medical Decision Making


ED Course and Treatment: 


05/10/17 12:03


Impression:


An 87 year old female presents after fall.





Differential Diagnosis included but are not limited to:  sycnope vs. mechanical 

fall vs. head injury r/o hemorrhage r/o CVA





Plan:


-- EKG


-- CT head


-- chest xray


-- Radiology pelvis


-- labs


-- Reassess and disposition





Prior Visits:


Notes and results from previous visits were reviewed.


Patient last reported to emergency department on 4/21/17 for evaluation of 

hallucinations. Patient was admitted to Dr. Muller's service.





Progress Notes:


EKG:


Ordered, reviewed, and independently interpreted the EKG.


Rate :  72 BPM


Rhythm : NSR


Interpretation : First degree AV block, left axis deviation, LVH 


Comparison : No previous EKG for comparison.





CT head:


Creator : Wale Camargo MD


05/10/2017 12:35


IMPRESSION:


Chronic microvascular changes.  No acute intracranial findings





Pelvis xray:


Creator : Dr. Caldwell-Lombardi, Kathleen V.


05/10/2017 12:58


IMPRESSION:


No fracture or dislocation appreciated. If symptoms persist/warrant consider CT 

or MRI for more sensitive evaluation.








chest xray:


Creator : Dr. Caldwell-Lombardi, Kathleen V.


05/10/2017 13:04


IMPRESSION:


No interval acute pathology.  Chronic interstitial lung changes. Cardiovascular 

findings as above





When patient came back from CT it was noted that she was not responsive. She 

was foaming at the both with some light blood. She was not witnessed to be 

convulsing  but reported to be tight in her arms. She did not fall. She slowly 

woke up from what appeared to be a postictal state. Neuro exam was unchanged 

from arrival. AAOx3. Drowsy. MS 5/5 AE, sensation intact, no NFD, good finger 

to nose. 





CT Head was negative for bleed. Aspirin AK ordered. I discussed case with Dr. Valera who recommends Keppra 500mg IV dose now and then BID. Dr. Muller is 

aware of admission to telemetry. 





05/10/17 16:55


Patient's HR went into Afib at 140's.


Repeat EKG shows Afib at 139 bpm 


Cardiazem 10mg IV x 2 ordered and HR improved to 90's. 








Case was discussed with Dr. Muller again with updates of her status.








- Critical Care


Critical Care Minutes: 60 minutes





- Lab Interpretations


Lab Results: 








 05/10/17 12:15 





 05/10/17 12:15 





 Lab Results





05/10/17 13:56: Blood Type Confirm A POSITIVE


05/10/17 12:52: POC Glucose (mg/dL) 244 H


05/10/17 12:15: pO2 42, VBG pH 7.33, VBG pCO2 59.0, VBG HCO3 31.1 H, VBG Total 

CO2 32.9 H, VBG O2 Sat (Calc) 79.1 H, VBG Base Excess 3.6 H, VBG Potassium 4.3, 

Sodium 134.0, Chloride 102.0, Glucose 268 H, Lactate 1.4, FiO2 21.0, Venous 

Blood Potassium 4.3


05/10/17 12:15: Blood Type A POSITIVE, Antibody Screen Negative, BBK History 

Checked No verified bt


05/10/17 12:15: Hemoglobin A1c 7.4 H


05/10/17 12:15: Sodium 136, Chloride 96 L, Potassium 4.3, Carbon Dioxide 31, 

Anion Gap 13, BUN 18, Creatinine 0.8, Est GFR (African Amer) > 60, Est GFR (Non-

Af Amer) > 60, Random Glucose 263 H, Calcium 9.3, Total Bilirubin 0.8, AST 30, 

ALT 41, Alkaline Phosphatase 68, Lactate Dehydrogenase 693, Total Creatine 

Kinase 41, Troponin I 0.09  D, Total Protein 7.3, Albumin 3.8, Globulin 3.5, 

Albumin/Globulin Ratio 1.1, Triglycerides 116, Cholesterol 204 H, LDL 

Cholesterol Direct 127, HDL Cholesterol 40


05/10/17 12:15: PT 11.5, INR 1.06, APTT 24.7


05/10/17 12:15: WBC 10.4  D, RBC 4.33, Hgb 13.3, Hct 39.1, MCV 90.3, MCH 30.7, 

MCHC 34.0, RDW 12.6, Plt Count 233, MPV 10.2, Gran % 80.2 H, Lymph % (Auto) 

12.1 L, Mono % (Auto) 6.1 H, Eos % (Auto) 0.9 L, Baso % (Auto) 0.7, Gran # 8.35 

H, Lymph # 1.3, Mono # 0.6, Eos # 0.1, Baso # 0.07


05/10/17 11:39: POC Glucose (mg/dL) 302 H








I have reviewed the lab results: Yes





- RAD Interpretation


Radiology Orders: 








05/10/17 11:55


CHEST PORTABLE [RAD] Stat 





05/10/17 11:56


HEAD W/O CONTRAST [CT] Stat 





05/10/17 12:03


PELVIS ONE VIEW [RAD] Stat 














- EKG Interpretation


Interpreted by ED Physician: Yes


Type: 12 lead EKG





- Medication Orders


Current Medication Orders: 








Sodium Chloride (Sodium Chloride 0.9%)  1,000 mls @ 100 mls/hr IV .Q10H JOSHUA


   Last Admin: 05/10/17 16:44  Dose: 100 mls/hr





Discontinued Medications





Diltiazem HCl (Cardizem)  10 mg IVP STAT STA


   Stop: 05/10/17 16:44


   Last Admin: 05/10/17 16:45  Dose: 10 mg





Diltiazem HCl (Cardizem)  30 mg PO STAT STA


   Stop: 05/10/17 16:51


   Last Admin: 05/10/17 17:40  Dose: 30 mg





Diltiazem HCl (Cardizem)  10 mg IVP STAT STA


   Stop: 05/10/17 17:33


   Last Admin: 05/10/17 17:35  Dose: 10 mg





Levetiracetam (Keppra 500mg Ivpb)  500 mg in 100 mls @ 400 mls/hr IVPB STAT STA


   Stop: 05/10/17 16:36


   Last Admin: 05/10/17 16:43  Dose: 400 mls/hr





Pneumococcal Polyvalent Vaccine (Pneumovax 23 Vaccine)  0.5 ml IM .ONCE ONE


   Stop: 05/10/17 17:39











NIHSS Scale ([place])


Time Performed: 11:42





- How Severe is the Stoke


  ** Baseline


Level of Consciousness: 0=Alert


LOC to Questions: 0=Both comments correct


LOC to commands: 0=Obeys both correctly


Best Gaze: 0=Normal


Visual: 0=No visual loss


Facial: 0=Normal


Motor Arm - Left: 0=No drift


Motor Arm - Right: 0=No drift


Motor Leg - Left: 0=No drift


Motor Leg - Right: 0=No drift


Limb Ataxia: 0=Absent


Sensory: 0=Normal


Best Language: 0=No aphasia


Dysarthia: 0=Normal articulation


Extinction & Inattention (Neglect): 0=Normal, no object


Score: 0


Risk Level: No Stroke Risk





rTPA Inclusion/Exclusion





- Refusal of Treatment


Patient Refused Treatment: No





- Inclusion Criteria for Altepase


Patient is 18 years or Older: Yes


The Clinical Diagnosis of Ischemic Stroke That is Causing a Potentially 

Disabling Neurological Deficit: Yes


Time of Onset is Well Established to be Less Than 270 Minute Before Treatment 

Would Begin: No


Risk/Benefit Discussed With Patient/Family Member Present: No





- Warning to TPA With Conditions


Condition: Stroke Serevity Too Mild, Age Greater Than 75 years





- Scribe Statement


The provider has reviewed the documentation as recorded by the Kareem Prather





Provider Scribe Attestation:


All medical record entries made by the Scribe were at my direction and 

personally dictated by me. I have reviewed the chart and agree that the record 

accurately reflects my personal performance of the history, physical exam, 

medical decision making, and the department course for this patient. I have 

also personally directed, reviewed, and agree with the discharge instructions 

and disposition.











Disposition/Present on Arrival





- Present on Arrival


Any Indicators Present on Arrival: Yes


History of DVT/PE: No


History of Uncontrolled Diabetes: Yes


Urinary Catheter: No


History of Decub. Ulcer: No


History Surgical Site Infection Following: None





- Disposition


Have Diagnosis and Disposition been Completed?: Yes


Diagnosis: 


 Altered mental status, Seizure, Fall, Head injury, Rapid atrial fibrillation





Disposition: HOSPITALIZED


Disposition Time: 14:00


Patient Plan: Admission


Patient Problems: 


 Current Active Problems











Problem Status Onset


 


Altered mental status Acute  


 


Fall Acute  


 


Head injury Acute  


 


Seizure Acute  











Condition: GUARDED

## 2017-05-10 NOTE — CARD
--------------- APPROVED REPORT --------------





EKG Measurement

Heart [id number]

VT [id number]

[id number]

[id number]

[id number]



<Conclusion>

Sinus rhythm with 1st degree AV block

Possible Left atrial enlargement

Left axis deviation

Left ventricular hypertrophy with repolarization abnormality

PRWP

## 2017-05-10 NOTE — RAD
HISTORY:

altered mental status  



COMPARISON:

No prior. 



FINDINGS:



LUNGS:

Chronic appearing interstitial lung changes.  No consolidation. Left 

hemidiaphragm is slightly asymmetrically elevated more so than before







PLEURA:

No significant pleural effusion identified, no pneumothorax apparent.



CARDIOVASCULAR:

Cardiomegaly, atherosclerotic vascular calcifications with tortuosity 

of the thoracic aorta.  Aortic valve prosthesis. Intact sternal 

wires/CABG 







OSSEOUS STRUCTURES:

Generalized osteopenia, thoracic spondylosis. Left shoulder arthrosis



VISUALIZED UPPER ABDOMEN:

Normal.



OTHER FINDINGS:

None.



IMPRESSION:

No interval acute pathology.  Chronic interstitial lung changes. 

Cardiovascular findings as above

## 2017-05-11 LAB
APPEARANCE UR: CLEAR
BACTERIA #/AREA URNS HPF: (no result) /[HPF]
BILIRUB UR-MCNC: NEGATIVE MG/DL
COLOR UR: YELLOW
GLUCOSE UR STRIP-MCNC: 250 MG/DL
KETONES UR STRIP-MCNC: (no result) MG/DL
LEUKOCYTE ESTERASE UR-ACNC: NEGATIVE LEU/UL
PH UR STRIP: 5.5 [PH] (ref 4.7–8)
PROT UR STRIP-MCNC: (no result) MG/DL
RBC # UR STRIP: NEGATIVE /UL
RBC #/AREA URNS HPF: (no result) /HPF (ref 0–2)
SP GR UR STRIP: >= 1.03 (ref 1–1.03)
UROBILINOGEN UR STRIP-ACNC: 0.2 E.U./DL
WBC #/AREA URNS HPF: (no result) /HPF (ref 0–6)

## 2017-05-11 RX ADMIN — INSULIN HUMAN SCH UNITS: 100 INJECTION, SOLUTION PARENTERAL at 22:08

## 2017-05-11 RX ADMIN — POLYETHYLENE GLYCOL 3350 SCH GM: 17 POWDER, FOR SOLUTION ORAL at 09:20

## 2017-05-11 NOTE — CON
DATE: 05/11/2017



HISTORY OF PRESENT ILLNESS:  Shortly, the patient is an 87-year-old  female with multiple me
dical comorbidities including afib, hypertension and dyslipidemia.  The patient also has history of d
elirium and psychotic symptoms, visual hallucinations.  This writer is very familiar with this patien
t from the previous admission on the medical floor.  The patient was transferred from [facility] status post fall.  Psych consult was called for evaluation of possible visual hallucinations. 
 The patient was seen today, this afternoon.  The patient presented to be pleasantly confused.  The p
joshua does not remember the circumstances of her admission to the hospital.  The patient does not re
member this writer.  The patient reported that she does not know why she is in the hospital and very 
hard to have meaningful conversation because the patient is very confused, but pleasant.  As per [facility] staff report, patient has episodes where she would have visual hallucinations.  Last admission, t
he patient saw a dogs in her apartment, no dog was there.  The patient also has feeling that someone 
is coming out from the TV today.  The patient does not remember such episodes.  



VITAL SIGNS:  Stable.  Temperature 98.5, pulse is 62, blood pressure 120/60, respirations 20.



MEDICATIONS:  Reviewed.  The patient is on Aricept 5 mg daily, Keppra 500 mg twice a day, Cozaar, Pro
tonix, MiraLax.  The patient is on Seroquel 50 mg at the nighttime, Risperdal 0.25 mg twice a day.  T
he patient is on sotalol and Coumadin.



LABORATORY DATA:  Reviewed.  Seem to be within normal limits.  The patient was seen by Dr. Valera as 
well as Dr. Herman.



MENTAL STATUS EXAMINATION:  The patient appears to be alert, but disoriented, intermittent eye contac
t, pleasantly confused.  The patient was smiling.  Mood described as "I am fine".  Affect was reactiv
e.  At times, the patient smiles inappropriately.  Thought process is disorganized.  Thought content:
  The patient denied visual, auditory, tactile hallucinations during the interview, but most likely kim lewis was not able to understand the context of the question.  Earlier, patient complained that some
body is coming out from the TV.  Insight and judgment are limited.  Impulses are well controlled at t
he present moment.



IMPRESSION:  The patient deemed to have delirium symptoms, visual hallucinations are related to medic
al issues or withdrawal symptoms or seizures.  Very rare patient would have visual hallucinations if 
patient has mental illness.  Most likely the patient has delirium stage.  The patient also has multip
le medical issues, status post fall.  Please see Dr. Muller's note for more detailed information.



PLAN:  The patient should not be on 2 antipsychotic medications.  Seroquel could be discontinued.  
is writer will continue Risperdal only and will give liquid form because sometimes patient has tenden
cy of refusing to take medications.  Continue current management.  The patient will be followed up wi
 neurology as well as infectious disease.  We will follow up on this patient and advise accordingly
.



Thank you very much for letting me participate in care of your patient.





__________________________________________

Nancy Reilly MD







cc:



DD: 05/11/2017 17:41:01  [id number]

TT: 05/11/2017 19:09:39

Confirmation # [id number]

Dictation # [id number]

nayan

## 2017-05-11 NOTE — HP
CHIEF COMPLAINT:  Fall, hitting head on the floor.



HISTORY OF PRESENT ILLNESS:  The patient is an 87-year-old lady with history of 
atrial fibrillation, is on Coumadin, but most of the time she was refusing 
comedine in  rehab in [facility].  As per nursing staff, she had a 
fall.  According to nursing report, patient hit her head.  The patient reports 
that she fell last night and does not remember hitting her head and she also 
reports that she was not unconscious during the fall. The patient denies loss 
of consciousness, but has intermittent altered mental status, 



PAST MEDICAL HISTORY:  As above, atrial fibrillation, hypertension, 
hypercholesterolemia, advanced dementia, COPD, diabetes mellitus, radical 
mastectomy bilaterally, history of breast cancer.



FAMILY HISTORY:  Father and mother noncontributory.



HABITS:  Never smoked, no drugs, no ethanol.



HOME MEDICATIONS:  Aricept, Protonix, MiraLax, Seroquel, Risperdal.



REVIEW OF SYSTEMS:  The patient seen and examined in the Emergency Room, looks 
like change in the status of the mental.  No shortness of breath.  No nausea, 
vomiting, or diarrhea.  No headache, no dizziness, no fever, no chills.



PHYSICAL EXAMINATION:

VITAL SIGNS:  Temperature 98.2 , pulse 73, respiratory rate 16, blood pressure 
160/72.

HEENT:  Head normocephalic.  Eyes:  PERRLA. Extraocular muscles intact.  
Conjunctivae are clear.  Nose patent.  Mucous membranes moist.

NECK:  Supple.  No carotid bruit, JVD or thyromegaly.

CHEST:  Bilaterally symmetrical.

HEART:  S1, S2 positive.

LUNGS:  Clear to auscultation.

ABDOMEN:  Soft.  Bowel sounds present.  No organomegaly.

EXTREMITIES:  No edema, no cyanosis.

NEUROLOGIC:  The patient is awake, alert, moving all 4 extremities.  No focal 
deficits.



LABORATORY DATA:  White blood cells 10.4, hemoglobin 13.3, hematocrit 39.1, 
platelets ,  Sodium  noted , potassium 4.3, BUN 80, creatinine 0.8, glucose 263.



ASSESSMENT AND PLAN:  The patient is an 87-year-old lady who has encephalopathy 
and possibly toxic metabolic, as per Dr. Korey Valera.  Rule out seizure, 
though it is less likely, as per Dr. Valera.  According to him, he will do EEG 
in the morning and follow up. CAT scan of the head is done and reviewed by me.  
The patient has history of hypertension, non-insulin dependent diabetes mellitus
, chronic obstructive pulmonary disease, coronary artery disease, atrial 
fibrillation, dementia, history of breast cancer bilaterally, mastectomy, 
history of valve replacement.  Was getting rehab in [facility].  Rule out sepsis.  I appreciated Dr. Herman's input.  ____ chronic 
obstructive pulmonary disease. Supportive care.  We will follow up.





__________________________________________

Jennifer Muller MD







cc:   



DD: 05/10/2017 20:07:15  [id number]

TT: 05/10/2017 21:19:35

Job # [id number]

rn



05/11/2017 06:37:06

RISHI

## 2017-05-11 NOTE — CON
DATE: 05/11/2017



REFERRING PHYSICIAN:   _____.



REASON FOR CONSULT:  _____, rule out sleep apnea syndrome, status post syncopal type episode.



HISTORY OF PRESENT ILLNESS:  This is an 87-year-old female with past medical history significant for 
hypertension, hyperlipidemia, diabetes, history of breast cancer requiring bilateral mastectomy in th
e remote past, history of open heart surgery in the remote past.  Apparently, has a questionable near
 syncopal episode.  Came into ER, was admitted for further workup including rule out seizure.  Presen
tly, she is lying in the bed.  Her family is at bedside, awake, alert.  No headache, no rhinitis, no 
nausea, no vomiting, no diarrhea.  No leg pain or leg swelling.



PAST MEDICAL HISTORY:  As per history of present illness.  Also has chronic obstructive lung disease.




ALLERGIES:  SULFA.



SOCIAL HISTORY:  Nonsmoker, nondrinker.



FAMILY HISTORY:  No significant cardiopulmonary disease reported.



MEDICATIONS:  She is on Aricept 5 mg daily, Betapace 80 mg twice a day, Coumadin 3 mg will be given t
onight, Cozaar 100 mg daily, Keppra 500 mg twice a day, MiraLax 17 grams p.o. daily, Protonix 40 mg d
aily, Risperdal 0.25 mg twice a day, Seroquel 50 mg at bedtime.



REVIEW OF SYSTEMS:  No headache, no rhinitis.  Admits to have sleep, being tired during the daytime, 
and does not know if she snores.  No chest pain, no nausea, no vomiting, no diarrhea.  No dysuria.  N
o leg pain or leg swelling.



PHYSICAL EXAMINATION:

GENERAL:  No acute distress.

VITAL SIGNS:  Temp is 98, heart rate is 62, respiratory rate is 20, blood pressure 120/60, pulse ox 9
5% on nasal cannula.

HEENT:  Moist mucous membranes.  Small oral cavity.

NECK:  Supple.  No JVD.

LUNGS:  Few scattered rhonchi.

HEART:  Irregularly, irregular.

ABDOMEN:  Soft, nontender.  No organomegaly.

EXTREMITIES:  There is no edema.

NEUROLOGIC:  Awake, alert, follows simple commands.



LABORATORY DATA:  Shows hemoglobin 13.3, hematocrit 39.1, WBC 10.4, platelet is 233, yesterday INR 1.
06, PTT 25.  Blood gases shows VBG, pH 7.33, pCO2 of 59, O2 of 42.  Sodium 136, potassium 4.3, chlori
de 96, bicarbonate 31, BUN 18, creatinine 0.8, glucose 263.  Hemoglobin A1c 7.4, calcium 9.3, AST ___
__, ALT 41, alkaline phosphatase is 68, LDH is 693.  Troponin 0.05.  Cholesterol is 204.  Has a CAT s
can of the head done in ER, which shows chronic microvascular changes, otherwise unremarkable.  Pelvi
c x-ray was done, which shows no fracture or dislocation.  Chest x-ray shows chronic interstitial ana laura
nges.  No new infiltrate reported.



IMPRESSION AND PLAN:  Atrial fibrillation with rapid ventricular response, status post controlled rat
e, chronic interstitial lung disease, hypertension, diabetes, Alzheimer's type dementia, status post 
near syncopal episode.  Neurology consult has been called and workup is in progress.  Pulmonary point
 of view, she is doing okay.  We will keep head elevated at 45 degrees, aspiration precaution.  Gastr
ic prophylaxis.  Continue anticoagulation.  Understands risk/benefit ratio.  Thank you and will follo
w with you.





__________________________________________

Claire Barcenas MD







cc:



DD: 05/11/2017 17:25:34  [id number]

TT: 05/11/2017 18:50:51

Confirmation # [id number]

Dictation # [id number]

mn

## 2017-05-11 NOTE — CARD
--------------- APPROVED REPORT --------------





EKG Measurement

Heart [id number]

[id number]

[id number]

[id number]



<Conclusion>

Atrial fib.

Left axis deviation

Left ventricular hypertrophy 

STTW changes

## 2017-05-11 NOTE — CARD
--------------- APPROVED REPORT --------------





EKG Measurement

Heart [id number]

[id number]

[id number]

[id number]



<Conclusion>

Atrial fibrillation with rapid ventricular response

Left axis deviation

Moderate voltage criteria for LVH, may be normal variant

ST & T wave abnormality, consider lateral ischemia

## 2017-05-11 NOTE — CP.PCM.CON
History of Present Illness





- History of Present Illness


History of Present Illness: 





Infectious Disease Follow Up:


May 11, 2017





86 yo female presenting with fall at [facility].  

Patient is a poor historian.  No major complaints by the patient at this time.  

No fevers or chills reported.  She states she was conscious during the fall.  

She has an extensive medical history that includes HTN, NIDDM, COPD, CAD, 

Atrial fibrillation, Dementia, and history of breast cancer.








Past Patient History





- Infectious Disease


Hx of Infectious Diseases: None





- Tetanus Immunizations


Tetanus Immunization: Unknown





- Past Social History


Smoking Status: Never Smoked





- CARDIAC


Hx Cardiac Disorders: Yes


Hx Hypertension: Yes





- PULMONARY


Hx Chronic Obstructive Pulmonary Disease (COPD): Yes





- NEUROLOGICAL


HX Cerebrovascular Accident: Yes





- HEENT


Hx HEENT Problems: Yes ([place])





- RENAL


Hx Chronic Kidney Disease: No





- ENDOCRINE/METABOLIC


Hx Diabetes Mellitus Type 2: Yes





- HEMATOLOGICAL/ONCOLOGICAL


Hx Cancer: Yes (breast)





- INTEGUMENTARY


Hx Dermatological Problems: No





- MUSCULOSKELETAL/RHEUMATOLOGICAL


Hx Falls: Yes





- GASTROINTESTINAL


Hx Gastrointestinal Disorders: Yes (POOR APPETTITE,)





- GENITOURINARY/GYNECOLOGICAL


Hx Genitourinary Disorders: No


Hx Reproductive Disorders: No





- PSYCHIATRIC


Hx Psychophysiologic Disorder: No


Hx Substance Use: No





- SURGICAL HISTORY


Hx Open Heart Surgery: Yes


Other/Comment: Radical mastectomy.





- ANESTHESIA


Hx Anesthesia: Yes


Hx Anesthesia Reactions: No


Hx Malignant Hyperthermia: No





Meds


Allergies/Adverse Reactions: 


 Allergies











Allergy/AdvReac Type Severity Reaction Status Date / Time


 


FISH Allergy  NAUSEA Verified 05/10/17 11:51


 


Sulfa (Sulfonamide Allergy  HEADACHE Verified 05/10/17 11:51





Antibiotics)     














- Medications


Medications: 


 Current Medications





Donepezil HCl (Aricept)  5 mg PO DAILY [facility]


   Last Admin: 05/11/17 09:20 Dose:  5 mg


Insulin Human Regular (Humulin R Low)  0 units SC ACHS [facility]


   PRN Reason: Protocol


   Last Admin: 05/11/17 22:08 Dose:  2 units


Levetiracetam (Keppra)  500 mg PO BID [facility]


   Last Admin: 05/11/17 17:48 Dose:  500 mg


Losartan Potassium (Cozaar)  100 mg PO DAILY [facility]


   Last Admin: 05/11/17 09:20 Dose:  100 mg


Pantoprazole Sodium (Protonix Ec Tab)  40 mg PO ACB [facility]


Polyethylene Glycol (Miralax)  17 gm PO DAILY [facility]


   Last Admin: 05/11/17 09:20 Dose:  17 gm


Risperidone (Risperdal Tab)  0.5 mg PO AMHS [facility]


   PRN Reason: Protocol


   Last Admin: 05/11/17 21:27 Dose:  0.5 mg


Sotalol HCl (Betapace)  80 mg PO BID [facility]


   Last Admin: 05/11/17 17:48 Dose:  80 mg


Warfarin Sodium (Coumadin)  3 mg PO 1800 [facility]


   PRN Reason: Protocol


   Last Admin: 05/11/17 17:48 Dose:  3 mg











Physical Exam





- Constitutional


Appears: Non-toxic, No Acute Distress, Chronically Ill





- Head Exam


Head Exam: ATRAUMATIC, NORMOCEPHALIC





- Eye Exam


Eye Exam: EOMI, PERRL


Pupil Exam: NORMAL ACCOMODATION, PERRL





- ENT Exam


ENT Exam: Mucous Membranes Moist, Normal External Ear Exam, TM's Normal 

Bilaterally





- Neck Exam


Neck exam: Positive for: Full Rom, Normal Inspection





- Respiratory Exam


Respiratory Exam: Clear to Auscultation Bilateral, NORMAL BREATHING PATTERN.  

absent: Rales, Rhonchi, Wheezes





- Cardiovascular Exam


Cardiovascular Exam: REGULAR RHYTHM, RRR, +S1, +S2





- GI/Abdominal Exam


GI & Abdominal Exam: Normal Bowel Sounds, Soft.  absent: Distended, Tenderness





- Extremities Exam


Extremities exam: Positive for: full ROM, normal inspection





- Neurological Exam


Neurological exam: Alert, CN II-XII Intact, Oriented x3





- Psychiatric Exam


Psychiatric exam: Normal Affect, Normal Mood





- Skin


Skin Exam: Intact, Normal Color





Results





- Vital Signs


Recent Vital Signs: 


 Last Vital Signs











Temp  98.4 F   05/11/17 18:00


 


Pulse  70   05/11/17 18:00


 


Resp  16   05/11/17 18:00


 


BP  160/75 H  05/11/17 18:00


 


Pulse Ox  96   05/11/17 06:14














- Labs


Result Diagrams: 


 05/10/17 12:15





 05/10/17 12:15


Labs: 


 Laboratory Results - last 24 hr











  05/11/17 05/11/17





  13:50 14:01


 


Troponin I   0.05  D


 


Urine Color  Yellow 


 


Urine Appearance  Clear 


 


Urine pH  5.5 


 


Ur Specific Gravity  >= 1.030 


 


Urine Protein  Trace H 


 


Urine Glucose (UA)  250 H 


 


Urine Ketones  Trace H 


 


Urine Blood  Negative 


 


Urine Nitrate  Negative 


 


Urine Bilirubin  Negative 


 


Urine Urobilinogen  0.2 


 


Ur Leukocyte Esterase  Negative 


 


Urine RBC  0 - 2 


 


Urine WBC  0 - 2 


 


Ur Epithelial Cells  6 - 8 


 


Urine Bacteria  Mod 














Assessment & Plan





- Assessment and Plan (Free Text)


Assessment: 





86 yo female with fall at nursing facility.  No documented head trauma.  No 

fevers or leukocytosis.  Supportive care.  Not currently on IV antibiotics.  

Would check urinalysis.  Chest X-ray showing chronic interstitial changes.  CT 

Head showing no acute intracranial findings.  Check urinalysis before 

consideration of antibiotic use at this point.  Supportive care.





Not on antibiotics... no strong indicators for infection at this time.





Thank you for allowing me to participate in the care of the patient, we will 

follow with you.

## 2017-05-11 NOTE — CON
DATE: 05/11/2017



FOLLOWUP:  This is an 87-year-old white female with a past medical history of hypertension and hyperl
ipidemia.  The patient fell and hit her head.  No loss of consciousness.  CAT scan of the head was do
ne, which did not show any bleed.  The patient is a poor historian.  Called to evaluate.  Also EEG wa
s done which showed isolated sharp waves were noted.  We started her on Keppra 500 twice a day.



PAST MEDICAL HISTORY:  Atrial fibrillation, hypertension, dementia, diabetes and history of breast ca
ncer.



PHYSICAL EXAMINATION:

HEENT:  Intermittent confusional.

NEUROLOGIC:  Pupils reactive.  EOM intact.  Spontaneous movement of all the extremities noted.  Deep 
tendon reflexes 1+.  Both plantars are downgoing.  Sensory appears intact.  Cerebellar gait deferred.




IMPRESSION:  This is an 87-year-old white female with a possible seizure and the patient was started 
on Keppra 500 twice a day.  Continue present management.  We will follow up.





__________________________________________

Korey Valera MD







cc:



DD: 05/11/2017 18:21:20  [id number]

TT: 05/11/2017 19:23:45

Confirmation # [id number]

Dictation # [id number]

mn

## 2017-05-12 VITALS
SYSTOLIC BLOOD PRESSURE: 132 MMHG | RESPIRATION RATE: 18 BRPM | HEART RATE: 57 BPM | DIASTOLIC BLOOD PRESSURE: 65 MMHG | TEMPERATURE: 97 F

## 2017-05-12 VITALS — OXYGEN SATURATION: 94 %

## 2017-05-12 LAB — INR PPP: 1.07 (ref 0.93–1.08)

## 2017-05-12 RX ADMIN — INSULIN HUMAN SCH UNITS: 100 INJECTION, SOLUTION PARENTERAL at 13:27

## 2017-05-12 RX ADMIN — INSULIN HUMAN SCH UNITS: 100 INJECTION, SOLUTION PARENTERAL at 07:47

## 2017-05-12 RX ADMIN — POLYETHYLENE GLYCOL 3350 SCH GM: 17 POWDER, FOR SOLUTION ORAL at 09:04

## 2017-05-12 NOTE — CP.PCM.CON
History of Present Illness





- History of Present Illness


History of Present Illness: 





Palliative consult requested by Dr JAMARI Muller notified


Reason: Advance care planning





87 year old female sent from [facility] after falling and hitting her head.  CT 

scan showed no acute findings. She appeared to have a seizure after returning 

from CT scan. She was palced on Keppra, no other seizure activity reported. 





PMH:Atrial fibrillation, HTN, HLD, breast cancer s/p right radical mastectomy, 

delirium,hallucinations DM,COPD.





Social History: Non smoker, no alcohol or drug use.





Family History: Non contributory.





Advance Care Planning: The patient has an Advance Directive. Her health care 

[phone]





Review of System: Patient is confused, unable to participate 





  











Past Patient History





- Infectious Disease


Hx of Infectious Diseases: None





- Tetanus Immunizations


Tetanus Immunization: Unknown





- Past Social History


Smoking Status: Never Smoked





- CARDIAC


Hx Cardiac Disorders: Yes


Hx Hypertension: Yes





- PULMONARY


Hx Chronic Obstructive Pulmonary Disease (COPD): Yes





- NEUROLOGICAL


HX Cerebrovascular Accident: Yes





- HEENT


Hx HEENT Problems: Yes ([place])





- RENAL


Hx Chronic Kidney Disease: No





- ENDOCRINE/METABOLIC


Hx Diabetes Mellitus Type 2: Yes





- HEMATOLOGICAL/ONCOLOGICAL


Hx Cancer: Yes (breast)





- INTEGUMENTARY


Hx Dermatological Problems: No





- MUSCULOSKELETAL/RHEUMATOLOGICAL


Hx Falls: Yes





- GASTROINTESTINAL


Hx Gastrointestinal Disorders: Yes (POOR APPETTITE,)





- GENITOURINARY/GYNECOLOGICAL


Hx Genitourinary Disorders: No


Hx Reproductive Disorders: No





- PSYCHIATRIC


Hx Psychophysiologic Disorder: No


Hx Substance Use: No





- SURGICAL HISTORY


Hx Open Heart Surgery: Yes


Other/Comment: Radical mastectomy.





- ANESTHESIA


Hx Anesthesia: Yes


Hx Anesthesia Reactions: No


Hx Malignant Hyperthermia: No





Meds


Home Medications: 


 Home Medication List











 Medication  Instructions  Recorded  Confirmed  Type


 


levETIRAcetam [Keppra] 500 mg PO BID #30 tab 05/12/17  Rx


 


risperiDONE [RisperDAL Tab] 0.5 mg PO AMHS #60 tab 05/12/17  Rx











Allergies/Adverse Reactions: 


 Allergies











Allergy/AdvReac Type Severity Reaction Status Date / Time


 


FISH Allergy  NAUSEA Verified 05/10/17 11:51


 


Sulfa (Sulfonamide Allergy  HEADACHE Verified 05/10/17 11:51





Antibiotics)     














- Medications


Medications: 


 Current Medications





Donepezil HCl (Aricept)  5 mg PO DAILY [facility]


   Last Admin: 05/12/17 09:03 Dose:  5 mg


Insulin Human Regular (Humulin R Low)  0 units SC ACHS [facility]


   PRN Reason: Protocol


   Last Admin: 05/12/17 07:47 Dose:  2 units


Levetiracetam (Keppra)  500 mg PO BID [facility]


   Last Admin: 05/11/17 17:48 Dose:  500 mg


Losartan Potassium (Cozaar)  100 mg PO DAILY [facility]


   Last Admin: 05/12/17 09:03 Dose:  100 mg


Pantoprazole Sodium (Protonix Ec Tab)  40 mg PO ACB [facility]


   Last Admin: 05/12/17 07:47 Dose:  40 mg


Polyethylene Glycol (Miralax)  17 gm PO DAILY [facility]


   Last Admin: 05/12/17 09:04 Dose:  17 gm


Risperidone (Risperdal Tab)  0.5 mg PO AMHS [facility]


   PRN Reason: Protocol


   Last Admin: 05/12/17 09:03 Dose:  0.5 mg


Sotalol HCl (Betapace)  80 mg PO BID [facility]


   Last Admin: 05/12/17 09:02 Dose:  80 mg


Warfarin Sodium (Coumadin)  5 mg PO 1800 [facility]


   PRN Reason: Protocol











Results





- Vital Signs


Recent Vital Signs: 


 Last Vital Signs











Temp  97 F L  05/12/17 12:00


 


Pulse  57 L  05/12/17 12:00


 


Resp  18   05/12/17 12:00


 


BP  132/65   05/12/17 12:00


 


Pulse Ox  94 L  05/12/17 05:04














- Labs


Result Diagrams: 


 05/10/17 12:15





 05/10/17 12:15


Labs: 


 Laboratory Results - last 24 hr











  05/11/17 05/11/17 05/11/17





  07:21 11:04 13:50


 


PT   


 


INR   


 


POC Glucose (mg/dL)  199 H  305 H 


 


Troponin I   


 


Urine Color    Yellow


 


Urine Appearance    Clear


 


Urine pH    5.5


 


Ur Specific Gravity    >= 1.030


 


Urine Protein    Trace H


 


Urine Glucose (UA)    250 H


 


Urine Ketones    Trace H


 


Urine Blood    Negative


 


Urine Nitrate    Negative


 


Urine Bilirubin    Negative


 


Urine Urobilinogen    0.2


 


Ur Leukocyte Esterase    Negative


 


Urine RBC    0 - 2


 


Urine WBC    0 - 2


 


Ur Epithelial Cells    6 - 8


 


Urine Bacteria    Mod














  05/11/17 05/11/17 05/12/17





  14:01 16:04 01:46


 


PT   


 


INR   


 


POC Glucose (mg/dL)   366 H  188 H


 


Troponin I  0.05  D  


 


Urine Color   


 


Urine Appearance   


 


Urine pH   


 


Ur Specific Gravity   


 


Urine Protein   


 


Urine Glucose (UA)   


 


Urine Ketones   


 


Urine Blood   


 


Urine Nitrate   


 


Urine Bilirubin   


 


Urine Urobilinogen   


 


Ur Leukocyte Esterase   


 


Urine RBC   


 


Urine WBC   


 


Ur Epithelial Cells   


 


Urine Bacteria   














  05/12/17 05/12/17





  05:30 07:28


 


PT  11.6 


 


INR  1.07 


 


POC Glucose (mg/dL)   223 H


 


Troponin I  


 


Urine Color  


 


Urine Appearance  


 


Urine pH  


 


Ur Specific Gravity  


 


Urine Protein  


 


Urine Glucose (UA)  


 


Urine Ketones  


 


Urine Blood  


 


Urine Nitrate  


 


Urine Bilirubin  


 


Urine Urobilinogen  


 


Ur Leukocyte Esterase  


 


Urine RBC  


 


Urine WBC  


 


Ur Epithelial Cells  


 


Urine Bacteria  














Assessment & Plan





- Assessment and Plan (Free Text)


Assessment: 





 87 year old female admitted after syncopal episode resulting in a fall while 

at [facility]. 





She is peasantry confused. Denies any complaints. Does not remember why she is 

here. 





I spoke with patient's niece (POA) Bessy Pastor via phone. Niece verifies 

that patient has an Advanced Directive. A copy is obtained and will be scanned 

into permanent EMR. Niece confirms patient is  is DNR/DNI. 





Time spent in discussion with niece regarding advance care planning, 15 minutes


Plan: 





 Advance care planning





- Date & Time


Date: 05/12/17


Time: 13:00

## 2017-05-12 NOTE — PN
DATE: 05/11/2017



SUBJECTIVE:  The patient seen and examined at the bedside.  Still having 
episodes of altered mental status.  According to her, last night her friend, 
Kira, was sitting on the bedside and the TV picture was coming out of TV, 
talking to me.  She had hallucinations and delusions.  We called consult with 
Dr. Reilly, the psychiatrist.  Otherwise, no hematuria or hematochezia.  
The patient is not a good historian.



PHYSICAL EXAMINATION:

VITAL SIGNS:  Temperature 98.4, pulse 59, blood pressure 150/70, respiratory 
rate is 16.

HEAD:  Normocephalic, atraumatic.

EYES:  PERRLA.  Extraocular movements intact, conjunctivae clear.

NOSE:  Patent.

MOUTH:  Mucous membranes moist.

NECK:  Supple.  No carotid bruit, JVD or thyromegaly.

CHEST:  Bilaterally symmetrical.

HEART:  S1, S2 positive.

LUNGS:  Clear to auscultation.

ABDOMEN:  Soft.  Bowel sounds present.  No organomegaly.

EXTREMITIES:  No edema, no cyanosis.

NEUROLOGIC:  The patient is awake, alert and moving all 4 extremities.  No 
focal deficits.



MEDICATIONS:  Aricept, Sotalol, Coumadin, Cozaar, insulin, Keppra, MiraLax, 
Protonix, Risperdal started by Dr. Reilly.



LABORATORY DATA:  We do not have recent labs today, but I reviewed old labs.



ASSESSMENT AND PLAN:  The patient is a 87-year-old lady with diabetes mellitus, 
hemoglobin A1c 7.4, sugar was high.  I put her on sliding scale with regular 
insulin; hypercholesterolemia, proteinuria, glucosuria, ketonuria, seen by Dr. Herman, ID.  Came with head trauma as per nursing staff, but we cannot see any 
signs or symptoms of head trauma.  CT of head showed no acute intracranial 
finding.  It looks like the patient has urinary tract infection, maybe needs 
antibiotics.  I reviewed Dr. Korey Valera's notes also.  The patient has atrial 
fibrillation and possibly seizures as per Dr. Valera.  He started Keppra 500 mg 
twice a day and wanted to do EEG on the patient.  Seen by Dr. Reilly for 
her hallucinations and delusions; history of hypertension.  According to patient
, she does not know why she is the hospital.  Altered mental status.  Seen by 
Dr. Barcenas.  The patient has history of chronic obstructive pulmonary disease.  
The patient has atrial fibrillation with rapid ventricular response, status 
post controlled rate.  The patient has dementia, near syncopal attack, gastric 
and deep venous thrombosis prophylaxis.  We will follow up.





__________________________________________

Jennifer Muller MD







cc:   



DD: 05/11/2017 23:24:46  [id number]

TT: 05/12/2017 07:03:31

Confirmation # [id number]

Dictation # [id number]

nn

MTDD

## 2017-05-12 NOTE — PN
DATE: 05/12/2017



Shortly, patient is an 87-year-old with history of hallucinations.  The patient was transferred from 
[facility] for evaluation status post fall, was found to have new onset of seizures.  The
 patient was seen by neurologist.  This writer had initial evaluation yesterday.  The patient was sta
rted on Risperdal.  The patient was seen today for followup.  The patient presented much better to claire lopez with the previous admission on the medical side.  The patient is less irritable, pleasant, smil
ing appropriately.  The patient is hard of hearing.  At times, her responses take longer, but it is r
elated to the fact that patient is not hearing very well.  During the nighttime, patient was calm, co
operative, slept good.  No agitation, no aggression observed.  The patient is not psychotic at presen
t moment, but yesterday, patient had impression that somebody is coming out from the TV.  The patient
 also had history of visual hallucinations.  The patient was seeing dogs in her apartment[facility]  Going back to the patient's presentation, vital signs are stable.



VITAL SIGNS:  Temperature is 96.6, pulse is 79, blood pressure 130/60, respirations 20, oxygen satura
tion is 94%.



MEDICATIONS:  Reviewed.  The patient is on Aricept 5 mg daily, Humulin, Keppra 500 mg twice a day was
 started by neurologist, Cozaar, Protonix, MiraLax, Risperdal 0.5 mg twice a day, sotalol 80 mg twice
 a day, Coumadin 3 mg at 6 p.m.



LABORATORIES:  Reviewed.  Most recent was from [facility].



MENTAL STATUS EXAMINATION:  The patient presented to be alert.  The patient knows that she is in the 
hospital.  Fair eye contact.  Speech was underproductive.  Mood described as "I cannot believe that I
 am in the hospital."  Affect was reactive, mood congruent.  Thought process was circumstantial, but 
more organized.  Thought content:  The patient denied any visual, auditory, tactile hallucinations, d
enied paranoid ideations.  The patient denied thoughts of harming herself or others, denied intent or
 plan.  Insight and judgment are limited, but improving.  Impulses are well predictable.



IMPRESSION:  The patient has history of delirium.  The patient has history of visual hallucinations, 
which could be related to delirium stage.  Neurology is on board.  New onset of seizures.  The patien
t has multiple medical problems.  Please see Dr. Muller's notes for more detailed information.



PLAN:  We will continue current management.  Risperdal 0.5 mg twice a day is recommended for visual h
allucinations.  The patient tolerated that well.  The patient does not exhibit any aggressive, agitat
ed or psychotic behavior today.  The patient deemed to be not in danger to self or others.  The patie
nt needs to be followed up with psychiatrist at the nursing home.  Advanced directives need to be dis
cussed with the patient.  Palliative care consult would be beneficial because patient is an 87-year-o
ld, has a lot of medical comorbidities and prone to have delirium.  Case was discussed with Dr. Angel wallace.  Should you have any questions, give me a call back.  The patient will be cleared by psychiatry.





__________________________________________

Nancy Reilly MD







cc:



DD: 05/12/2017 10:56:39  [id number]

TT: 05/12/2017 11:31:01

Confirmation # [id number]

Dictation # [id number]

en

## 2017-05-12 NOTE — CP.PCM.PN
Subjective





- Date & Time of Evaluation


Date of Evaluation: 05/12/17


Time of Evaluation: 15:30





- Subjective


Subjective: 


Infectious Disease Follow Up:


May 12, 2017





86 yo female presenting with fall at [facility].  

Patient is a poor historian.  No major complaints by the patient at this time.  

No fevers or chills reported.  She states she was conscious during the fall.  

She has an extensive medical history that includes HTN, NIDDM, COPD, CAD, 

Atrial fibrillation, Dementia, and history of breast cancer.





At this point, does not appear to have any significant infectious issues.





Objective





- Vital Signs/Intake and Output


Vital Signs (last 24 hours): 


 











Temp Pulse Resp BP Pulse Ox


 


 97 F L  57 L  18   132/65   94 L


 


 05/12/17 12:00  05/12/17 12:00  05/12/17 12:00  05/12/17 12:00  05/12/17 05:04








Intake and Output: 


 











 05/12/17 05/12/17





 06:59 18:59


 


Intake Total 1010 


 


Output Total 1300 


 


Balance -290 














- Medications


Medications: 


 Current Medications





Donepezil HCl (Aricept)  5 mg PO DAILY [facility]


   Last Admin: 05/12/17 09:03 Dose:  5 mg


Insulin Human Regular (Humulin R Low)  0 units SC ACHS [facility]


   PRN Reason: Protocol


   Last Admin: 05/12/17 13:27 Dose:  4 units


Levetiracetam (Keppra)  500 mg PO BID [facility]


   Last Admin: 05/11/17 17:48 Dose:  500 mg


Losartan Potassium (Cozaar)  100 mg PO DAILY [facility]


   Last Admin: 05/12/17 09:03 Dose:  100 mg


Pantoprazole Sodium (Protonix Ec Tab)  40 mg PO ACB [facility]


   Last Admin: 05/12/17 07:47 Dose:  40 mg


Polyethylene Glycol (Miralax)  17 gm PO DAILY [facility]


   Last Admin: 05/12/17 09:04 Dose:  17 gm


Risperidone (Risperdal Tab)  0.5 mg PO AMHS [facility]


   PRN Reason: Protocol


   Last Admin: 05/12/17 09:03 Dose:  0.5 mg


Sotalol HCl (Betapace)  80 mg PO BID [facility]


   Last Admin: 05/12/17 09:02 Dose:  80 mg


Warfarin Sodium (Coumadin)  5 mg PO 1800 [facility]


   PRN Reason: Protocol











- Labs


Labs: 


 











PT  11.6 Seconds (9.9-11.8)   05/12/17  05:30    


 


INR  1.07  (0.93-1.08)   05/12/17  05:30    


 


APTT  24.7 Seconds (23.7-30.8)   05/10/17  12:15    














- Constitutional


Appears: Non-toxic, No Acute Distress, Chronically Ill





- Head Exam


Head Exam: ATRAUMATIC, NORMOCEPHALIC





- Eye Exam


Eye Exam: EOMI, PERRL


Pupil Exam: NORMAL ACCOMODATION, PERRL





- ENT Exam


ENT Exam: Mucous Membranes Moist, Normal External Ear Exam, TM's Normal 

Bilaterally





- Neck Exam


Neck Exam: Full ROM, Normal Inspection





- Respiratory Exam


Respiratory Exam: Clear to Ausculation Bilateral, NORMAL BREATHING PATTERN.  

absent: Rales, Rhonchi, Wheezes





- Cardiovascular Exam


Cardiovascular Exam: REGULAR RHYTHM, RRR, +S1, +S2





- GI/Abdominal Exam


GI & Abdominal Exam: Soft, Normal Bowel Sounds.  absent: Distended, Tenderness





- Extremities Exam


Extremities Exam: Full ROM, Normal Inspection





- Neurological Exam


Neurological Exam: Alert, Awake, CN II-XII Intact





- Psychiatric Exam


Psychiatric exam: Normal Affect, Normal Mood





- Skin


Skin Exam: Intact, Normal Color





Assessment and Plan





- Assessment and Plan (Free Text)


Assessment: 





86 yo female with fall at nursing facility.  No documented head trauma.  No 

fevers or leukocytosis.  Supportive care.  Not currently on IV antibiotics.  

Would check urinalysis.  Chest X-ray showing chronic interstitial changes.  CT 

Head showing no acute intracranial findings.  Check urinalysis before 

consideration of antibiotic use at this point.  Continue supportive care.





Not on antibiotics... no strong indicators for infection at this time.





Thank you for allowing me to participate in the care of the patient, we will 

follow with you.

## 2017-05-12 NOTE — EEG
DATE: 05/12/2017



CONDITION OF RECORDING:  Drowsy.



DIAGNOSIS:  Seizure.



MEDICATIONS:  On Keppra, Seroquel, risperidone.



INTERPRETATION:  This is a 16-channel international recording.  The background activity was composed 
of 6-7 cycles per second.  There was a small amount of beta activity 16-20 cycles per second seen in 
this recording.  There was an increased amount of theta activity 5-7 cycles per second seen in this t
racing.  Drowsiness was characterized by mixed beta and theta activities.  Sleep was characterized by
 vertex transient sleep spindles and bilateral slowing.  Photic stimulation showed no change in the t
racing.  No paroxysmal activity noted in this recording.



CONCLUSION:  Abnormal electroencephalogram due to presence of diffuse slowing throughout the recordin
g consistent with bilateral cerebral dysfunction.  No evidence of any epileptiform activity.  Please 
clinically correlate.





__________________________________________

Chris Valera MD







cc:



DD: 05/12/2017 10:15:27  [id number]

TT: 05/12/2017 10:28:59

Confirmation # [id number]

Dictation # [id number]

en

## 2017-05-12 NOTE — PN
DATE: 05/12/2017



REFERRING PHYSICIAN:  Dr. Muller.



SUBJECTIVE:  She is out of bed to chair.  Night was unremarkable.  Feels better.  Does not remember e
xactly what happened when she had a fall.  No headache, no rhinitis, no cough, no nausea, no vomiting
, diarrhea.  No leg pain or leg swelling.



OBJECTIVE:

GENERAL:  No acute distress.

VITAL SIGNS:  Temperature is 98, heart rate is 79, respiratory rate is 18, blood pressure 130/60, pul
se ox 94% on room air.

HEENT:  Moist mucous membrane.  No ulcer or oral thrush noted.

NECK:  Supple.  No JVD.

LUNGS:  Has a few scattered rhonchi.

HEART:  S1, S2.

ABDOMEN:  Soft, nontender.  No organomegaly.

EXTREMITIES:  No edema.

NEUROLOGIC:  Awake, alert, follows simple commands.



MEDICATIONS:  She is on Aricept 5 mg daily, Sotalol 80 mg twice a day, Coumadin 3 mg was given last n
ight, Cozaar 100 mg daily, insulin coverage, Keppra 500 mg twice a day, MiraLax 17 grams p.o. daily, 
Protonix 40 mg daily, Risperdal 0.5 mg a.m. and at bedtime.



LABORATORY DATA:  Reviewed.  INR 1.07.  Blood sugar 223.



IMPRESSION AND PLAN:  Atrial fibrillation with rapid ventricular response, presently controlled rate,
 chronic interstitial lung disease, hypertension, diabetes, Alzheimer's type dementia, status post ne
ar syncope, questionable seizure.  Pulmonary point of view, doing well.  Continue supplemental oxygen
 if pulse ox less than 90%.  Fall precautions.  Neurology followup.  May give Coumadin 5 mg today.  I
NR in the morning.  We will follow with you.





__________________________________________

Claire Barcenas MD







cc:



DD: 05/12/2017 10:58:18  [id number]

TT: 05/12/2017 11:43:23

Confirmation # [id number]

Dictation # [id number]

[email or web address]

## 2017-05-29 NOTE — DS
CHIEF COMPLAINT:  Fall, hitting head on the floor.



HISTORY OF PRESENT ILLNESS:  The patient is an 87-year-old female, has history 
of atrial fibrillation, was on Coumadin,  living alone, is not able to do her 
activities of daily living, history of atrial fibrillation, has fall, has 
hallucinations and delusions.  The patient denies loss of consciousness.  We 
admitted the patient in the [facility].  The patient was seen by Clara Ayoub for palliative care, by Dr. Herman, Dr. Barcenas, Dr. Nancy Reilly (
psychiatrist), Dr. Valera (neurologist), and seen by the cardiologist.  Got 
better.  Transferred to [facility] for physical therapy for 
deconditioning.  Will continue treatment there.



PAST MEDICAL HISTORY:  Atrial fibrillation, hypertension, hypercholesterolemia, 
dementia, COPD, diabetes mellitus, radical mastectomy, history of breast cancer.



FAMILY HISTORY:  Father and mother noncontributory.



HABITS:  Never smoked, no drugs, no ethanol.



HOME MEDICATIONS:  Aricept, Protonix, MiraLax, Seroquel, Risperdal.  



REVIEW OF SYSTEMS:  The patient was seen and examined on the bedside.  Looks 
comfortable.  No nausea, vomiting, or diarrhea.  No hematuria or hematochezia.  
No swelling of the legs.  No chest pain or palpitation.



PHYSICAL EXAMINATION:  

VITAL SIGNS:  Temperature 98, heart rate 79, respiratory rate 18, blood 
pressure 130/50, pulse oximetry 94% on room air. 

HEENT:  Head normocephalic, atraumatic.  Eyes:  PERRLA.  Extraocular muscles 
intact.  Conjunctivae are clear.  Nose patent.  Mucous membranes moist.

NECK:  Supple.  No carotid bruit.  No JVD or thyromegaly.

CHEST:  Bilaterally symmetrical.

HEART:  S1, S2 positive.

LUNGS:  Clear to auscultation.

ABDOMEN:  Soft, nontender.  No organomegaly.

EXTREMITIES:  No edema, no cyanosis.

NEUROLOGIC:  The patient is awake, alert.  Moving all 4 extremities.  No focal 
deficits.



MEDICATIONS:  Aricept, sotalol, Coumadin, Cozaar, Keppra, MiraLax, Protonix, 
Risperdal.  



LABORATORY DATA:  INR 1.07.  Blood sugar 223.



ASSESSMENT AND PLAN:  The patient is an 87-year-old female with atrial 
fibrillation with rapid ventricular response, presented controlled rate, 
chronic interstitial lung disease, hypertension, diabetes mellitus, dementia, 
questionable seizures.  Pulmonary point of view, she is doing good.  Discussion 
done with patient's friends (Kathi and Kira) and patient's family (brother and 
niece).  Plan made.  Sending the patient to rehab.  Will continue treatment 
there.  Will follow up.





__________________________________________

Jennifer Muller MD







cc:   



DD: 05/28/2017 13:22:24  [id number]

TT: 05/29/2017 08:21:09

Job # [id number]

aga [facility]

## 2017-07-26 ENCOUNTER — HOSPITAL ENCOUNTER (INPATIENT)
Dept: HOSPITAL 42 - ED | Age: 82
LOS: 5 days | Discharge: NURSING, REHAB OR LONG-TERM CARE | DRG: 101 | End: 2017-07-31
Attending: INTERNAL MEDICINE | Admitting: INTERNAL MEDICINE
Payer: MEDICARE

## 2017-07-26 VITALS — BODY MASS INDEX: 25 KG/M2

## 2017-07-26 VITALS — HEART RATE: 64 BPM | HEART RATE: 64 BPM

## 2017-07-26 DIAGNOSIS — F41.9: ICD-10-CM

## 2017-07-26 DIAGNOSIS — I10: ICD-10-CM

## 2017-07-26 DIAGNOSIS — Z85.3: ICD-10-CM

## 2017-07-26 DIAGNOSIS — R56.9: Primary | ICD-10-CM

## 2017-07-26 DIAGNOSIS — F03.90: ICD-10-CM

## 2017-07-26 DIAGNOSIS — Z91.81: ICD-10-CM

## 2017-07-26 DIAGNOSIS — E78.5: ICD-10-CM

## 2017-07-26 DIAGNOSIS — J44.9: ICD-10-CM

## 2017-07-26 DIAGNOSIS — J98.11: ICD-10-CM

## 2017-07-26 DIAGNOSIS — R29.6: ICD-10-CM

## 2017-07-26 DIAGNOSIS — N39.0: ICD-10-CM

## 2017-07-26 DIAGNOSIS — E11.65: ICD-10-CM

## 2017-07-26 DIAGNOSIS — H91.90: ICD-10-CM

## 2017-07-26 DIAGNOSIS — E78.00: ICD-10-CM

## 2017-07-26 DIAGNOSIS — Z86.73: ICD-10-CM

## 2017-07-26 DIAGNOSIS — I48.91: ICD-10-CM

## 2017-07-26 DIAGNOSIS — Z91.14: ICD-10-CM

## 2017-07-26 DIAGNOSIS — Z90.13: ICD-10-CM

## 2017-07-26 DIAGNOSIS — Z79.01: ICD-10-CM

## 2017-07-26 LAB
ALBUMIN SERPL-MCNC: 4.3 G/DL (ref 3–4.8)
ALBUMIN/GLOB SERPL: 1.4 {RATIO} (ref 1.1–1.8)
ALT SERPL-CCNC: 23 U/L (ref 7–56)
APPEARANCE UR: (no result)
APTT BLD: 24.7 SECONDS (ref 23.7–30.8)
AST SERPL-CCNC: 31 U/L (ref 15–39)
BASOPHILS # BLD AUTO: 0.03 K/MM3 (ref 0–2)
BASOPHILS NFR BLD: 0.3 % (ref 0–3)
BILIRUB UR-MCNC: NEGATIVE MG/DL
BUN SERPL-MCNC: 22 MG/DL (ref 7–21)
CALCIUM SERPL-MCNC: 9.6 MG/DL (ref 8.4–10.5)
COLOR UR: YELLOW
EOSINOPHIL # BLD: 0.2 10*3/UL (ref 0–0.7)
EOSINOPHIL NFR BLD: 1.9 % (ref 1.5–5)
EPI CELLS #/AREA URNS HPF: (no result) /HPF (ref 0–5)
ERYTHROCYTE [DISTWIDTH] IN BLOOD BY AUTOMATED COUNT: 12.8 % (ref 11.5–14.5)
GFR NON-AFRICAN AMERICAN: > 60
GLUCOSE UR STRIP-MCNC: NEGATIVE MG/DL
GRANULOCYTES # BLD: 6.59 10*3/UL (ref 1.4–6.5)
GRANULOCYTES NFR BLD: 73.1 % (ref 50–68)
HGB BLD-MCNC: 13.6 GM/DL (ref 12–16)
INR PPP: 0.99 (ref 0.93–1.08)
LEUKOCYTE ESTERASE UR-ACNC: NEGATIVE LEU/UL
LYMPHOCYTES # BLD: 1.6 10*3/UL (ref 1.2–3.4)
LYMPHOCYTES NFR BLD AUTO: 17.2 % (ref 22–35)
MCH RBC QN AUTO: 30.7 PG (ref 25–35)
MCHC RBC AUTO-ENTMCNC: 34.4 G/DL (ref 31–37)
MCV RBC AUTO: 89.2 FL (ref 80–105)
MONOCYTES # BLD AUTO: 0.7 10*3/UL (ref 0.1–0.6)
MONOCYTES NFR BLD: 7.5 % (ref 1–6)
PH UR STRIP: 6 [PH] (ref 4.7–8)
PLATELET # BLD: 188 10^3/UL (ref 120–450)
PMV BLD AUTO: 10.7 FL (ref 7–11)
PROT UR STRIP-MCNC: 30 MG/DL
PROTHROMBIN TIME: 10.7 SECONDS (ref 9.9–11.8)
RBC # BLD AUTO: 4.43 10^6/UL (ref 3.5–6.1)
RBC # UR STRIP: (no result) /UL
RBC #/AREA URNS HPF: (no result) /HPF (ref 0–2)
SP GR UR STRIP: 1.02 (ref 1–1.03)
URINE NITRATE: NEGATIVE
UROBILINOGEN UR STRIP-ACNC: 0.2 E.U./DL
WBC # BLD AUTO: 9 10^3/UL (ref 4.5–11)
WBC #/AREA URNS HPF: NEGATIVE /HPF (ref 0–6)

## 2017-07-26 NOTE — RAD
HISTORY:

seizure  



COMPARISON:

Comparison made with prior chest radiograph 05/10/2017.  Comparison 

also made with CT scan chest abdomen and pelvis 10/11/2013 



FINDINGS:



LUNGS:

Mild bibasilar atelectasis however note that the left lung base 

remains partially obscured by multiple tiny calcifications left 

breast of uncertain etiology. There are a few scattered peribronchial 

cuffing changes with coarsened interstitial markings ; rule out 

sequela of reactive/inflammatory airway disease.



PLEURA:

No significant pleural effusion identified, no pneumothorax apparent.



CARDIOVASCULAR:

Enlarged sternotomy wires and valve replacement again noted. Heart 

remains enlarged.  Aorta is ectatic and uncoiled.



OSSEOUS STRUCTURES:

No significant abnormalities.



VISUALIZED UPPER ABDOMEN:

Normal.



OTHER FINDINGS:

None.



IMPRESSION:

Mild bibasilar atelectasis however note that the left lung base 

remains partially obscured by multiple tiny calcifications left 

breast of uncertain etiology. There are a few scattered peribronchial 

cuffing changes with coarsened interstitial markings; rule out 

sequela of reactive/inflammatory airway disease.

## 2017-07-26 NOTE — ED PDOC
Arrival/HPI





- General


Chief Complaint: Seizure


Time Seen by Provider: 07/26/17 16:55





- History of Present Illness


Narrative History of Present Illness (Text): 





07/26/17 17:41


Nohelia is a 88 year old female with past medical history significant for atrial 

fibrillation on coumadin, Hypertension, Hypercholesterolemia, advanced dementia

, COPD, and diabetes brought in by EMS after patient was witnessed to have a 

possible syncopal vs. seizure episode while at a [facility]. Pt is noted to be 

confused and minimally responsive to questioning. Through chart review patient 

is noted to be historically a poor historian. When questioned the patient is 

only able to recall going into the [facility] and then ending up in the emergency 

room. She is alert and oriented to person and time. Patient is joined by friend 

who was present during her fall. She reports the patient having a normal 

conversation one moment and then immediately after collapsing into her friend. 

Patient's friend denies seizure like activity. 








Past Medical History





- Provider Review


Nursing Documentation Reviewed: Yes





- Infectious Disease


Hx of Infectious Diseases: None





- Tetanus Immunization


Tetanus Immunization: Unknown





- Cardiac


Hx Cardiac Disorders: Yes


Hx Hypertension: Yes





- Pulmonary


Hx Chronic Obstructive Pulmonary Disease (COPD): Yes





- Neurological


HX Cerebrovascular Accident: Yes


Hx Seizures: Yes





- HEENT


Hx HEENT Disorder: Yes ([facility])





- Renal


Hx Renal Disorder: No





- Endocrine/Metabolic


Hx Diabetes Mellitus Type 2: Yes





- Hematological/Oncological


Hx Cancer: Yes (breast)





- Integumentary


Hx Dermatological Disorder: No





- Musculoskeletal/Rheumatological


Hx Falls: Yes





- Gastrointestinal


Hx Gastrointestinal Disorders: Yes (POOR APPETTITE,)





- Genitourinary/Gynecological


Hx Genitourinary Disorders: No


Hx Reproductive Disorders: No





- Psychiatric


Hx Psychophysiologic Disorder: No


Hx Substance Use: No





- Surgical History


Hx Open Heart Surgery: Yes


Other/Comment: Radical mastectomy.





- Anesthesia


Hx Anesthesia: Yes


Hx Anesthesia Reactions: No


Hx Malignant Hyperthermia: No





- Suicidal Assessment


Feels Threatened In Home Enviroment: No





Family/Social History





- Physician Review


Nursing Documentation Reviewed: Yes


Family/Social History: No Known Family HX


Smoking Status: Never Smoked


Hx Alcohol Use: No


Hx Substance Use: No


Hx Substance Use Treatment: No





Allergies/Home Meds


Allergies/Adverse Reactions: 


Allergies





FISH Allergy (Verified 07/26/17 17:07)


 NAUSEA


Sulfa (Sulfonamide Antibiotics) Allergy (Verified 07/26/17 17:07)


 HEADACHE








Home Medications: 


 Home Meds











 Medication  Instructions  Recorded  Confirmed


 


Donepezil HCl [Aricept] 5 mg PO DAILY 05/10/17 07/26/17


 


Pantoprazole Sodium [Protonix] 40 mg PO DAILY 05/10/17 07/26/17


 


Polyethylene Glycol 3350 [Miralax] 1 packet PO DAILY 05/10/17 07/26/17














Review of Systems





- Physician Review


All systems were reviewed & negative as marked: Yes





- Review of Systems


Eyes: absent: Vision Changes


Respiratory: absent: SOB


Cardiovascular: absent: Chest Pain


Neurological: absent: Headache, Dizziness





Physical Exam


Vital Signs Reviewed: Yes


Vital Signs











  Temp Pulse Resp BP Pulse Ox


 


 07/26/17 17:07  98.0 F  97 H  18  176/95 H  95











Temperature: Afebrile


Blood Pressure: Hypertensive


Respiratory Rate: Normal


Pain Distress: None


Mental Status: Positive for: Confused


Finger Stick Blood Glucose: 134





- Systems Exam


Head: Present: Atraumatic, Normocephalic


Pupils: Present: PERRL


Extroacular Muscles: Present: EOMI


Conjunctiva: Present: Normal


Neck: Present: Normal Range of Motion


Respiratory/Chest: Present: Clear to Auscultation, Good Air Exchange.  No: 

Respiratory Distress, Accessory Muscle Use


Cardiovascular: Present: Regular Rate and Rhythm, Normal S1, S2.  No: Murmurs


Abdomen: Present: Normal Bowel Sounds.  No: Tenderness, Distention, Peritoneal 

Signs


Upper Extremity: Present: Normal Inspection.  No: Cyanosis, Edema


Lower Extremity: Present: Normal Inspection.  No: Edema


Neurological: Present: GCS=15, CN II-XII Intact, Motor Func Grossly Intact, 

Normal Sensory Function


Skin: Present: Warm


Psychiatric: Present: Alert, Oriented x 3, Normal Insight, Normal Concentration





Medical Decision Making


ED Course and Treatment: 





07/26/17 18:52


Impression:


- Patient is a 88 year old female with past medical history significant for 

dementia, atrial fibrillation, hypertension, hypercholesterolemia, COPD, and 

Diabetes who presents to the emergency department via EMS after sustaining a 

fall at a drug store 15 minutes prior to arrival. 





Differential Diagnosis included but are not limited to:  





- Syncopal episode


- Pre-syncopal episode





Plan:





- Labs: CBC, CMP, Troponin, Urinalysis, EtOH lvl, PT, PTT


- Imaging: CXR, EKG, CT head


- Reassess and disposition





Progress Notes:











07/26/17 18:57


EKG:


Ordered, reviewed, and independently interpreted the EKG.


Rate :  97 BPM


Rhythm : Atrial fibrillation


Interpretation : No ST-segment elevations or depressions, no T-wave inversions, 

normal intervals.





 


Chest X-Ray


COMPARISON:


Comparison made with prior chest radiograph 05/10/2017.  Comparison also made 

with CT scan chest abdomen and pelvis 10/11/2013 





FINDINGS:


LUNGS:Mild bibasilar atelectasis however note that the left lung base remains 

partially obscured by multiple tiny calcifications left breast of uncertain 

etiology. There are a few scattered peribronchial cuffing changes with 

coarsened interstitial markings ; rule out sequela of reactive/inflammatory 

airway disease.


PLEURA:No significant pleural effusion identified, no pneumothorax apparent.


CARDIOVASCULAR:Enlarged sternotomy wires and valve replacement again noted. 

Heart remains enlarged.  Aorta is ectatic and uncoiled.


OSSEOUS STRUCTURES:No significant abnormalities.


VISUALIZED UPPER ABDOMEN:Normal.


OTHER FINDINGS:None.





IMPRESSION:Mild bibasilar atelectasis however note that the left lung base 

remains partially obscured by multiple tiny calcifications left breast of 

uncertain etiology. There are a few scattered peribronchial cuffing changes 

with coarsened interstitial markings; rule out sequela of reactive/inflammatory 

airway disease.








07/26/17 19:31


CT Head w/o contrast





Brain: Volume loss. Chronic small vessel white matter ischemic change. No acute 

infarct. Old bilateral caudate nucleus infarcts. 


Ventricles: No hydrocephalus


Bones/joints: Unremarkable. No acute fracture


Soft tissues: Unremarkable


Vasculature: Calcification along the distal internal carotid and vertebral 

artery


Sinuses: Mild ethmoid sinus mucosal thickening. No fluid levels. 


Mastoid air cells: Unremarkable as visualized. No mastoid effusion. 





Impression: No acute findings. Nonacute findings as above. 





07/26/17 20:13


Spoke with ginette regarding patient case and patient will be admitted for 

remote telemetry 





- Lab Interpretations


Lab Results: 








 07/26/17 17:10 





 07/26/17 17:10 





 Lab Results





07/26/17 17:35: Troponin I < 0.01  D


07/26/17 17:30: Urine Color Yellow, Urine Appearance Sl cloudy, Urine pH 6.0, 

Ur Specific Gravity 1.020, Urine Protein 30 H, Urine Glucose (UA) Negative, 

Urine Ketones Negative, Urine Blood Trace-lysed H, Urine Nitrate Negative, 

Urine Bilirubin Negative, Urine Urobilinogen 0.2, Ur Leukocyte Esterase Negative

, Urine RBC 0 - 2, Urine WBC Negative, Ur Epithelial Cells 0 - 2


07/26/17 17:10: Alcohol, Quantitative < 10


07/26/17 17:10: Sodium 138, Potassium 4.5, Chloride 99, Carbon Dioxide 25, 

Anion Gap 19, BUN 22 H, Creatinine 0.7, Est GFR (African Amer) > 60, Est GFR (

Non-Af Amer) > 60, Random Glucose 122 H, Calcium 9.6, Total Bilirubin 0.5, AST 

31, ALT 23, Alkaline Phosphatase 61, Total Protein 7.4, Albumin 4.3, Globulin 

3.2, Albumin/Globulin Ratio 1.4


07/26/17 17:10: PT 10.7, INR 0.99, APTT 24.7


07/26/17 17:10: WBC 9.0, RBC 4.43, Hgb 13.6, Hct 39.5, MCV 89.2, MCH 30.7, MCHC 

34.4, RDW 12.8, Plt Count 188, MPV 10.7, Gran % 73.1 H, Lymph % (Auto) 17.2 L, 

Mono % (Auto) 7.5 H, Eos % (Auto) 1.9, Baso % (Auto) 0.3, Gran # 6.59 H, Lymph 

# 1.6, Mono # 0.7 H, Eos # 0.2, Baso # 0.03











- RAD Interpretation


Radiology Orders: 








07/26/17 17:20


CHEST PORTABLE [RAD] Stat 





07/26/17 18:31


HEAD W/O CONTRAST [CT] Stat 














- PA / NP / Resident Statement


TERESA has reviewed & agrees with the documentation as recorded.


MD/ has examined the patient and agrees with the treatment plan.





Disposition/Present on Arrival





- Present on Arrival


Any Indicators Present on Arrival: No


History of DVT/PE: No


History of Uncontrolled Diabetes: No


Urinary Catheter: No


History of Decub. Ulcer: No


History Surgical Site Infection Following: None





- Disposition


Have Diagnosis and Disposition been Completed?: Yes


Diagnosis: 


 Syncope





Disposition: HOSPITALIZED


Disposition Time: 20:00


Patient Plan: Admission


Patient Problems: 


 Current Active Problems











Problem Status Onset


 


Syncope Acute  











Condition: STABLE


Discharge Instructions (ExitCare):  Syncope (ED)


Forms:  [facility] (English)

## 2017-07-26 NOTE — CARD
--------------- APPROVED REPORT --------------





EKG Measurement

Heart [id number]

SC [id number]

[id number]

[id number]

[id number]



<Conclusion>

Normal sinus rhythm

Possible Left atrial enlargement

Left axis deviation

Left ventricular hypertrophy

Nonspecific ST and T wave abnormality

Prolonged QT

Abnormal ECG

## 2017-07-27 LAB
ALBUMIN SERPL-MCNC: 4.2 G/DL (ref 3–4.8)
ALBUMIN/GLOB SERPL: 1.3 {RATIO} (ref 1.1–1.8)
ALT SERPL-CCNC: 19 U/L (ref 7–56)
APPEARANCE UR: CLEAR
ARTERIAL BLOOD GAS HEMOGLOBIN: 12.4 G/DL (ref 11.7–17.4)
ARTERIAL BLOOD GAS O2 SAT: 93.8 % (ref 95–98)
ARTERIAL BLOOD GAS PCO2: 38 MM/HG (ref 35–45)
ARTERIAL BLOOD GAS TCO2: 24.2 MMOL.L (ref 22–28)
AST SERPL-CCNC: 35 U/L (ref 15–39)
BASOPHILS # BLD AUTO: 0.04 K/MM3 (ref 0–2)
BASOPHILS NFR BLD: 0.4 % (ref 0–3)
BILIRUB UR-MCNC: NEGATIVE MG/DL
BUN SERPL-MCNC: 17 MG/DL (ref 7–21)
CALCIUM SERPL-MCNC: 9.2 MG/DL (ref 8.4–10.5)
COLOR UR: YELLOW
EOSINOPHIL # BLD: 0.2 10*3/UL (ref 0–0.7)
EOSINOPHIL NFR BLD: 1.6 % (ref 1.5–5)
ERYTHROCYTE [DISTWIDTH] IN BLOOD BY AUTOMATED COUNT: 12.7 % (ref 11.5–14.5)
GFR NON-AFRICAN AMERICAN: > 60
GLUCOSE UR STRIP-MCNC: NEGATIVE MG/DL
GRANULOCYTES # BLD: 7.89 10*3/UL (ref 1.4–6.5)
GRANULOCYTES NFR BLD: 74.4 % (ref 50–68)
HCO3 BLDA-SCNC: 23 MMOL/L (ref 21–28)
HGB BLD-MCNC: 12.9 GM/DL (ref 12–16)
INHALED O2 CONCENTRATION: 21 %
INR PPP: 1.06 (ref 0.93–1.08)
LEUKOCYTE ESTERASE UR-ACNC: (no result) LEU/UL
LYMPHOCYTES # BLD: 1.4 10*3/UL (ref 1.2–3.4)
LYMPHOCYTES NFR BLD AUTO: 13.3 % (ref 22–35)
MAGNESIUM SERPL-MCNC: 1.6 MG/DL (ref 1.7–2.2)
MCH RBC QN AUTO: 30.5 PG (ref 25–35)
MCHC RBC AUTO-ENTMCNC: 34.4 G/DL (ref 31–37)
MCV RBC AUTO: 88.7 FL (ref 80–105)
MONOCYTES # BLD AUTO: 1.1 10*3/UL (ref 0.1–0.6)
MONOCYTES NFR BLD: 10.3 % (ref 1–6)
O2 CAP BLDA-SCNC: 17 ML/DL (ref 16–24)
O2 CT BLDA-SCNC: 15.9 ML/DL (ref 15–23)
PH BLDA: 7.39 [PH] (ref 7.35–7.45)
PH UR STRIP: 7.5 [PH] (ref 4.7–8)
PLATELET # BLD: 163 10^3/UL (ref 120–450)
PMV BLD AUTO: 9.6 FL (ref 7–11)
PO2 BLDA: 59 MM/HG (ref 80–100)
PROT UR STRIP-MCNC: (no result) MG/DL
PROTHROMBIN TIME: 11.4 SECONDS (ref 9.9–11.8)
RBC # BLD AUTO: 4.23 10^6/UL (ref 3.5–6.1)
RBC # UR STRIP: NEGATIVE /UL
SP GR UR STRIP: 1.01 (ref 1–1.03)
TROPONIN I SERPL-MCNC: < 0.01 NG/ML
URINE NITRATE: NEGATIVE
UROBILINOGEN UR STRIP-ACNC: 0.2 E.U./DL
WBC # BLD AUTO: 10.6 10^3/UL (ref 4.5–11)

## 2017-07-27 RX ADMIN — POLYETHYLENE GLYCOL 3350 SCH GM: 17 POWDER, FOR SOLUTION ORAL at 10:04

## 2017-07-27 RX ADMIN — INSULIN HUMAN SCH: 100 INJECTION, SOLUTION PARENTERAL at 22:22

## 2017-07-27 RX ADMIN — Medication SCH MG: at 10:03

## 2017-07-27 RX ADMIN — INSULIN HUMAN SCH: 100 INJECTION, SOLUTION PARENTERAL at 12:28

## 2017-07-27 RX ADMIN — INSULIN HUMAN SCH: 100 INJECTION, SOLUTION PARENTERAL at 17:00

## 2017-07-27 RX ADMIN — PANTOPRAZOLE SODIUM SCH: 40 TABLET, DELAYED RELEASE ORAL at 06:18

## 2017-07-27 NOTE — CP.PCM.PN
Subjective





- Date & Time of Evaluation


Date of Evaluation: 07/27/17


Time of Evaluation: 06:23





- Subjective


Subjective: 


Patient was seen at bedside because she had an episode of  seizure.


 As per nurse , had tonic,clonic movement aof whole body for about 30 seconds.


 There was no incontinence and was little foaming.


 Medical record was reviewed.


 This 88 year old white woman was admitted 


 Has PMH of Atrial fibrillation on coumadin, HTN, HLD,dementia,COPD, DM II ,

breast cancer, multiple falls, radical


 mastectomy.





Objective





- Vital Signs/Intake and Output


Vital Signs (last 24 hours): 


 











Temp Pulse Resp BP Pulse Ox


 


 98.1 F   86   18   141/74   97 


 


 07/26/17 22:10  07/27/17 02:00  07/26/17 22:10  07/26/17 22:10  07/26/17 21:14








Intake and Output: 


 











 07/26/17 07/27/17





 18:59 06:59


 


Intake Total  120


 


Output Total  1000


 


Balance  -880














- Medications


Medications: 


 Current Medications





Donepezil HCl (Aricept)  5 mg PO HS [facility]


   Last Admin: 07/26/17 23:33 Dose:  5 mg


Levetiracetam (Keppra)  500 mg PO BID [facility]


Losartan Potassium (Cozaar)  100 mg PO DAILY [facility]


Pantoprazole Sodium (Protonix Ec Tab)  40 mg PO 0600 [facility]


   Last Admin: 07/27/17 06:18 Dose:  Not Given


Polyethylene Glycol (Miralax)  17 gm PO DAILY [facility]


Risperidone (Risperdal Tab)  0.5 mg PO AMHS [facility]


   PRN Reason: Protocol


Sotalol HCl (Betapace)  80 mg PO BID [facility]


Warfarin Sodium (Coumadin)  3 mg PO 1800 [facility]


   PRN Reason: Protocol











- Labs


Labs: 


 











PT  10.7 Seconds (9.9-11.8)   07/26/17  17:10    


 


INR  0.99  (0.93-1.08)   07/26/17  17:10    


 


APTT  24.7 Seconds (23.7-30.8)   07/26/17  17:10    














- Constitutional


Appears: Well, No Acute Distress





- Head Exam


Head Exam: ATRAUMATIC, NORMAL INSPECTION, NORMOCEPHALIC





- Eye Exam


Eye Exam: Normal appearance





- ENT Exam


ENT Exam: Normal External Ear Exam





- Neck Exam


Neck Exam: Normal Inspection





- Respiratory Exam


Respiratory Exam: NORMAL BREATHING PATTERN





- Cardiovascular Exam


Cardiovascular Exam: absent: JVD





- GI/Abdominal Exam


GI & Abdominal Exam: absent: Distended





- Rectal Exam


Rectal Exam: Deferred





-  Exam


Additional comments: 





Deferred.





- Extremities Exam


Extremities Exam: Normal Inspection





- Back Exam


Back Exam: NORMAL INSPECTION





- Neurological Exam


Neurological Exam: Altered


Additional comments: 





Post ictal stage.





- Psychiatric Exam


Additional comments: 





Post ictal.





- Skin


Skin Exam: Normal Color





Assessment and Plan





- Assessment and Plan (Free Text)


Assessment: 





Seizure.


Dementia.


HTN.


Hx breast cancer.


Atrial fibrillation-0n coumadin.


Plan: 





Ativan 0.5 mg IV stat.


Discussed with .


Placed a call to neurologist.


Continue monitor and present management.

## 2017-07-27 NOTE — CP.PCM.PN
Subjective





- Date & Time of Evaluation


Date of Evaluation: 07/27/17


Time of Evaluation: 11:00





- Subjective


Subjective: 








This is an 88 year old female with PMHx HTN, A-fib, DM, HLD, anxiety, breast 

cancer s/p mastectomy, chronic hearing difficulties, cognitive impairment who 

presented to the hospital after a fall. Information obtained from the chart due 

to patient's current drowsy state. Patient was walking with her friend in [facility] 

where she suddenly collapsed onto the friend. They were carrying on normal 

conversation just prior to this. Per patient's friend, she did not witness any 

seizure-like actvities. This morning at around 6-6:30 AM, the patient was seen 

experiencing whole body tremors and foaming at the mouth. Ativan 1 mg was given 

which stopped the convulsions. Patient has since been in a post-ictal state. 











Objective





- Vital Signs/Intake and Output


Vital Signs (last 24 hours): 


 











Temp Pulse Resp BP Pulse Ox


 


 98.9 F   82   20   166/93 H  95 


 


 07/27/17 17:14  07/27/17 18:00  07/27/17 17:14  07/27/17 17:48  07/27/17 06:00











- Medications


Medications: 


 Current Medications





Donepezil HCl (Aricept)  5 mg PO HS [facility]


   Last Admin: 07/26/17 23:33 Dose:  5 mg


Insulin Human Regular (Humulin R Low)  0 units SC ACHS [facility]


   PRN Reason: Protocol


   Last Admin: 07/27/17 22:22 Dose:  Not Given


Levetiracetam (Keppra)  500 mg PO BID [facility]


   Last Admin: 07/27/17 17:48 Dose:  500 mg


Losartan Potassium (Cozaar)  100 mg PO DAILY [facility]


   Last Admin: 07/27/17 10:03 Dose:  100 mg


Magnesium Oxide (Mag-Ox)  400 mg PO DAILY [facility]


   Last Admin: 07/27/17 10:03 Dose:  400 mg


Pantoprazole Sodium (Protonix Ec Tab)  40 mg PO 0600 [facility]


   Last Admin: 07/27/17 06:18 Dose:  Not Given


Polyethylene Glycol (Miralax)  17 gm PO DAILY [facility]


   Last Admin: 07/27/17 10:04 Dose:  17 gm


Risperidone (Risperdal Tab)  0.5 mg PO AMHS [facility]


   PRN Reason: Protocol


   Last Admin: 07/27/17 10:15 Dose:  Not Given


Sotalol HCl (Betapace)  80 mg PO BID [facility]


   Last Admin: 07/27/17 17:48 Dose:  80 mg


Warfarin Sodium (Coumadin)  3 mg PO 1800 [facility]


   PRN Reason: Protocol


   Last Admin: 07/27/17 17:47 Dose:  3 mg











- Labs


Labs: 


 











PT  11.4 Seconds (9.9-11.8)   07/27/17  16:21    


 


INR  1.06  (0.93-1.08)   07/27/17  16:21    


 


APTT  24.7 Seconds (23.7-30.8)   07/26/17  17:10    














- Constitutional


Appears: Well





- Head Exam


Head Exam: ATRAUMATIC, NORMAL INSPECTION, NORMOCEPHALIC





- Eye Exam


Eye Exam: EOMI, Normal appearance, PERRL


Pupil Exam: NORMAL ACCOMODATION, PERRL





- ENT Exam


ENT Exam: Mucous Membranes Moist, Normal Exam





- Neck Exam


Neck Exam: Full ROM, Normal Inspection.  absent: Lymphadenopathy





- Respiratory Exam


Respiratory Exam: Clear to Ausculation Bilateral, NORMAL BREATHING PATTERN





- Cardiovascular Exam


Cardiovascular Exam: REGULAR RHYTHM, +S1, +S2.  absent: Murmur





- GI/Abdominal Exam


GI & Abdominal Exam: Soft, Normal Bowel Sounds.  absent: Tenderness





- Rectal Exam


Rectal Exam: NORMAL INSPECTION





-  Exam


 Exam: Circumcision, NORMAL INSPECTION


External exam: NORMAL EXTERNAL EXAM


Speculum exam: NORMAL SPECULUM EXAM


Bimanual exam: NORMAL BIMANUAL EXAM





- Extremities Exam


Extremities Exam: Full ROM, Normal Capillary Refill, Normal Inspection.  absent

: Joint Swelling, Pedal Edema





- Back Exam


Back Exam: NORMAL INSPECTION





- Neurological Exam


Neurological Exam: Alert, Awake, CN II-XII Intact, Normal Gait, Oriented x3





- Psychiatric Exam


Psychiatric exam: Normal Affect, Normal Mood





- Skin


Skin Exam: Dry, Intact, Normal Color, Warm





Assessment and Plan





- Assessment and Plan (Free Text)


Assessment: 








- Assessment and Plan (Free Text)


Assessment: 





This is an 88 year old female with PMHx HTN, A-fib, DM, HLD, anxiety, breast 

cancer s/p mastectomy, chronic hearing difficulties, cognitive impairment who 

presented to the hospital after a fall. It is possible that this is a seizure 

episode, although previous EEG in 5/12/17 revealed diffuse slowing and 

bilateral cerebral dysfunction with no elipteform activity. Head CT showed no 

acute findings. Likely the seizure could be secondary to transient cerebral 

hypoperfusion and neurodegenerative.


Plan: 





1) MRI brain to assess for brain abnormalities


2) EEG to assess for epileptiform activity


3) Continue Keppra 500 mg BID


4) Avoid sedative medications


5) delirium, fall, seizure precautions


6) PT evaluation





*Try to avoid overuse of antipsychotics as they can lower the seizure threshold.

## 2017-07-27 NOTE — CARD
--------------- APPROVED REPORT --------------





EKG Measurement

Heart [id number]

WY [id number]

[id number]

[id number]

[id number]



<Conclusion>

Sinus rhythm with 1st degree AV block

Possible Left atrial enlargement

Left axis deviation

Left ventricular hypertrophy with repolarization abnormality

Abnormal ECG

## 2017-07-27 NOTE — CT
PROCEDURE:  CT HEAD WITHOUT CONTRAST.



HISTORY:

r/o ICH



COMPARISON:

05/10/2017 



TECHNIQUE:

Axial computed tomography images were obtained through the head/brain 

without intravenous contrast.  



Radiation dose:



Total exam DLP = 725 mGy-cm.



This CT exam was performed using one or more of the following dose 

reduction techniques: Automated exposure control, adjustment of the 

mA and/or kV according to patient size, and/or use of iterative 

reconstruction technique.



FINDINGS:



HEMORRHAGE:

No intracranial hemorrhage. 



BRAIN:

No mass effect or edema.  Chronic microvascular changes are seen in 

the periventricular white matter.



VENTRICLES:

Unremarkable. No hydrocephalus. 



CALVARIUM:

Unremarkable.



PARANASAL SINUSES:

Unremarkable as visualized. No significant inflammatory changes.



MASTOID AIR CELLS:

Unremarkable as visualized. No inflammatory changes.



OTHER FINDINGS:

The report concurs with the preliminary [facility] report



IMPRESSION:

No acute intracranial findings

## 2017-07-27 NOTE — CP.PCM.CON
<Edin Bess - Last Filed: 07/27/17 11:57>





History of Present Illness





- History of Present Illness


History of Present Illness: 





PGY-1 Consult Note for Dr. Valera's Neurology Service:





Reason for consult: seizure





This is an 88 year old female with PMHx HTN, A-fib, DM, HLD, anxiety, breast 

cancer s/p mastectomy, chronic hearing difficulties, cognitive impairment who 

presented to the hospital after a fall. Information obtained from the chart due 

to patient's current drowsy state. Patient was walking with her friend in [facility] 

where she suddenly collapsed onto the friend. They were carrying on normal 

conversation just prior to this. Per patient's friend, she did not witness any 

seizure-like actvities. This morning at around 6-6:30 AM, the patient was seen 

experiencing whole body tremors and foaming at the mouth. Ativan 1 mg was given 

which stopped the convulsions. Patient has since been in a post-ictal state. 





PMHx: HTN, A-fib, DM, HLD, anxiety, breast cancer s/p mastectomy, chronic 

hearing difficulties, cognitive impairment


PSHx: Mastectomy


Allergies: Sulfa and fish


Social: No history of tobacco, alcohol, drugs.





Review of Systems





- Review of Systems


Review of Systems: 





14-point ROS unable to ascertain due to patient's drowsy and lethargic state





Past Patient History





- Infectious Disease


Hx of Infectious Diseases: None





- Tetanus Immunizations


Tetanus Immunization: Unknown





- Past Social History


Smoking Status: Never Smoked





- CARDIAC


Hx Cardiac Disorders: Yes (Afib (on Coumadin))


Hx Cardia Arrhythmia: Yes (SVT)


Hx Hypercholesterolemia: Yes


Hx Hypertension: Yes





- PULMONARY


Hx Chronic Obstructive Pulmonary Disease (COPD): Yes





- NEUROLOGICAL


HX Cerebrovascular Accident: Yes


Hx Dementia: Yes


Hx Seizures: Yes





- HEENT


Hx HEENT Problems: Yes (Hard of hearing)





- RENAL


Hx Chronic Kidney Disease: No





- ENDOCRINE/METABOLIC


Hx Diabetes Mellitus Type 2: Yes





- HEMATOLOGICAL/ONCOLOGICAL


Hx Cancer: Yes (Breast CA)


Hx Chemotherapy: Yes





- INTEGUMENTARY


Hx Dermatological Problems: No





- MUSCULOSKELETAL/RHEUMATOLOGICAL


Hx Falls: Yes


Hx Fractures: Yes (Right ankle)


Hx Osteoarthritis: Yes





- GASTROINTESTINAL


Hx Gastrointestinal Disorders: Yes (POOR APPETTITE,)





- GENITOURINARY/GYNECOLOGICAL


Hx Urinary Tract Infection: Yes





- PSYCHIATRIC


Hx Substance Use: No





- SURGICAL HISTORY


Hx Mastectomy: Yes





- ANESTHESIA


Hx Anesthesia: Yes


Hx Anesthesia Reactions: No


Hx Malignant Hyperthermia: No





Meds


Allergies/Adverse Reactions: 


 Allergies











Allergy/AdvReac Type Severity Reaction Status Date / Time


 


FISH Allergy  NAUSEA Verified 07/26/17 17:07


 


Sulfa (Sulfonamide Allergy  HEADACHE Verified 07/26/17 17:07





Antibiotics)     














- Medications


Medications: 


 Current Medications





Donepezil HCl (Aricept)  5 mg PO HS [facility]


   Last Admin: 07/26/17 23:33 Dose:  5 mg


Insulin Human Regular (Humulin R Low)  0 units SC ACHS [facility]


   PRN Reason: Protocol


Levetiracetam (Keppra)  500 mg PO BID [facility]


   Last Admin: 07/27/17 10:03 Dose:  500 mg


Losartan Potassium (Cozaar)  100 mg PO DAILY [facility]


   Last Admin: 07/27/17 10:03 Dose:  100 mg


Magnesium Oxide (Mag-Ox)  400 mg PO DAILY [facility]


   Last Admin: 07/27/17 10:03 Dose:  400 mg


Pantoprazole Sodium (Protonix Ec Tab)  40 mg PO 0600 [facility]


   Last Admin: 07/27/17 06:18 Dose:  Not Given


Polyethylene Glycol (Miralax)  17 gm PO DAILY [facility]


   Last Admin: 07/27/17 10:04 Dose:  17 gm


Risperidone (Risperdal Tab)  [facility]


   PRN Reason: Protocol


Sotalol HCl (Betapace)  80 mg PO BID [facility]


   Last Admin: 07/27/17 10:04 Dose:  80 mg


Warfarin Sodium (Coumadin)  3 mg PO 1800 [facility]


   PRN Reason: Protocol











Physical Exam





- Constitutional


Appears: Confused





- Head Exam


Head Exam: ATRAUMATIC, NORMAL INSPECTION, NORMOCEPHALIC





- Eye Exam


Eye Exam: EOMI, PERRL





- Respiratory Exam


Respiratory Exam: Clear to Auscultation Bilateral





- Cardiovascular Exam


Cardiovascular Exam: REGULAR RHYTHM





- GI/Abdominal Exam


GI & Abdominal Exam: Distended, Normal Bowel Sounds, Soft





- Neurological Exam


Neurological exam: Alert, Oriented x3, Reflexes Normal


Additional comments: 





Patient is alert to person, place, and time. However, she is unaware of her 

current situation and is confused as to why she is in the hospital.


EOM intact.


Moving all 4 extremities.


Ability to follow directions is intermittent due to present mental status.


No pronator drift.


Reflexes 2/4 throughout.








Results





- Vital Signs


Recent Vital Signs: 


 Last Vital Signs











Temp  98.1 F   07/27/17 06:00


 


Pulse  93 H  07/27/17 10:04


 


Resp  20   07/27/17 06:00


 


BP  166/93 H  07/27/17 10:04


 


Pulse Ox  95   07/27/17 06:00














- Labs


Result Diagrams: 


 07/27/17 06:30





 07/27/17 06:30


Labs: 


 Laboratory Results - last 24 hr











  07/27/17 07/27/17 07/27/17





  06:30 06:30 07:15


 


WBC  10.6  


 


RBC  4.23  


 


Hgb  12.9  


 


Hct  37.5  


 


MCV  88.7  


 


MCH  30.5  


 


MCHC  34.4  


 


RDW  12.7  


 


Plt Count  163  


 


MPV  9.6  


 


Gran %  74.4 H  


 


Lymph % (Auto)  13.3 L  


 


Mono % (Auto)  10.3 H  


 


Eos % (Auto)  1.6  


 


Baso % (Auto)  0.4  


 


Gran #  7.89 H  


 


Lymph #  1.4  


 


Mono #  1.1 H  


 


Eos #  0.2  


 


Baso #  0.04  


 


pCO2    38


 


pO2    59.0 L


 


HCO3    23.0


 


ABG pH    7.39


 


ABG Total CO2    24.2


 


ABG O2 Saturation    93.8 L


 


ABG O2 Content    15.9


 


ABG Base Excess    -1.7


 


ABG Hemoglobin    12.4


 


ABG Carboxyhemoglobin    2.1 H


 


POC ABG HHb (Measured)    6.0 H


 


ABG Methemoglobin    0.4


 


ABG O2 Capacity    17.0


 


Hgb O2 Saturation    91.4 L


 


FiO2    21.0


 


Sodium   138 


 


Potassium   4.0 


 


Chloride   101 


 


Carbon Dioxide   21 


 


Anion Gap   20 


 


BUN   17 


 


Creatinine   0.7 


 


Est GFR ( Amer)   > 60 


 


Est GFR (Non-Af Amer)   > 60 


 


Random Glucose   179 H 


 


Calcium   9.2 


 


Phosphorus   4.3 


 


Magnesium   1.6 L 


 


Total Bilirubin   1.0 


 


AST   35 


 


ALT   19 


 


Alkaline Phosphatase   66 


 


Troponin I   < 0.01 


 


Total Protein   7.5 


 


Albumin   4.2 


 


Globulin   3.3 


 


Albumin/Globulin Ratio   1.3 














Assessment & Plan





- Assessment and Plan (Free Text)


Assessment: 





This is an 88 year old female with PMHx HTN, A-fib, DM, HLD, anxiety, breast 

cancer s/p mastectomy, chronic hearing difficulties, cognitive impairment who 

presented to the hospital after a fall. It is possible that this is a seizure 

episode, although previous EEG in 5/12/17 revealed diffuse slowing and 

bilateral cerebral dysfunction with no elipteform activity. Head CT showed no 

acute findings. Likely the seizure could be secondary to transient cerebral 

hypoperfusion and neurodegenerative.


Plan: 





1) MRI brain to assess for brain abnormalities


2) EEG to assess for epileptiform activity


3) Continue Keppra 500 mg BID


4) Avoid sedative medications


5) delirium, fall, seizure precautions


6) PT evaluation





*Try to avoid overuse of antipsychotics as they can lower the seizure threshold.





Case discussed with Dr. Moraima Bess, PGY-1





- Date & Time


Date: 07/27/17


Time: 07:30





<Chris Valera - Last Filed: 07/27/17 13:59>





Meds





- Medications


Medications: 


 Current Medications





Donepezil HCl (Aricept)  5 mg PO HS [facility]


   Last Admin: 07/26/17 23:33 Dose:  5 mg


Insulin Human Regular (Humulin R Low)  0 units SC ACHS [facility]


   PRN Reason: Protocol


   Last Admin: 07/27/17 12:28 Dose:  Not Given


Levetiracetam (Keppra)  500 mg PO BID [facility]


   Last Admin: 07/27/17 10:03 Dose:  500 mg


Losartan Potassium (Cozaar)  100 mg PO DAILY [facility]


   Last Admin: 07/27/17 10:03 Dose:  100 mg


Magnesium Oxide (Mag-Ox)  400 mg PO DAILY [facility]


   Last Admin: 07/27/17 10:03 Dose:  400 mg


Pantoprazole Sodium (Protonix Ec Tab)  40 mg PO 0600 [facility]


   Last Admin: 07/27/17 06:18 Dose:  Not Given


Polyethylene Glycol (Miralax)  17 gm PO DAILY [facility]


   Last Admin: 07/27/17 10:04 Dose:  17 gm


Risperidone (Risperdal Tab)  0.5 mg PO [facility]


   PRN Reason: Protocol


Sotalol HCl (Betapace)  80 mg PO BID [facility]


   Last Admin: 07/27/17 10:04 Dose:  80 mg


Warfarin Sodium (Coumadin)  3 mg PO 1800 [facility]


   PRN Reason: Protocol











Results





- Vital Signs


Recent Vital Signs: 


 Last Vital Signs











Temp  98.1 F   07/27/17 12:00


 


Pulse  66   07/27/17 12:00


 


Resp  16   07/27/17 12:00


 


BP  145/69   07/27/17 12:00


 


Pulse Ox  95   07/27/17 06:00














- Labs


Result Diagrams: 


 07/27/17 06:30





 07/27/17 06:30





Attending/Attestation





- Attestation


I have personally seen and examined this patient.: Yes


I have fully participated in the care of the patient.: Yes


I have reviewed all pertinent clinical information: Yes

## 2017-07-27 NOTE — HP
CHIEF COMPLIANT:  Syncope.



HISTORY OF PRESENT ILLNESS:  Ms. Nohelia Castañeda is 88 years old female with

past medical history significant for atrial fibrillation on Coumadin,

hypertension, hypercholesterolemia, advanced dementia, COPD, diabetes

mellitus, brought by EMS after the patient was witnessed to have possible

syncope/seizure while she was doing a shopping at  [facility] .  The patient is

noted to be confused and minimal responsive to the questioning in the

beginning.  The patient was with her friend, Kathi, and  [facility] 911.  The

patient is a poor historian.  The patient's friend, Kria is on the bedside,

she gave me the history.  The patient is alert and oriented.  According to

the patient's friend, Kathi, the patient was just very normal conversation

and immediately collapsed into her friend's lap.  The patient's friend

denies seizure-like activity and the patient did not have fall on the

ground.



PAST MEDICAL HISTORY:  Hypertension, COPD, cerebrovascular accident,

history of seizures, diabetes mellitus type 2, history of breast cancer,

history of multiple falls and radical mastectomy.



FAMILY HISTORY:  Father and mother, noncontributory.



HABITS:  Never smoked.  No drug and no ethanol.



ALLERGIES:  THE PATIENT HAS ALLERGIC WITH FISH AND SULFA.



HOME MEDICATIONS:  Aricept, Protonix and MiraLax.



REVIEW OF SYSTEMS:  The patient is seen and examined on the bedside in the

emergency room.  Friend Kira was on the bedside.  No shortness of breath. 

No nausea, vomiting, or diarrhea.  No hematuria and no hematochezia.  No

swelling of the leg.  No chest pain.  No palpitation.  No headaches or

dizziness.



PHYSICAL EXAMINATION:

VITAL SIGNS:  Temperature of 98, pulse of 97, respiratory rate of 18, blood

pressure of 170/95, and pulse oximetry of 95%.

HEENT:  Head is normocephalic and atraumatic.  Eyes:  PERRLA.  Extraocular

movements intact.  Conjunctivae are clear.  Nose is patent.

NECK:  Supple.  No carotid bruit and no thyromegaly.

CHEST:  Bilaterally symmetrical.

HEART:  S1 and S2 positive.

LUNGS:  Clear to auscultation.

ABDOMEN:  Soft.  Bowel sounds are present.  No organomegaly.

EXTREMITIES:  No edema and no cyanosis.

NEUROLOGIC:  The patient is awake and alert.  Moving all four extremities. 

No focal deficits.



LABORATORY DATA:  White blood cells of 9.0, hemoglobin of 13.6, hematocrit

of 39.5 and platelets  noted .  Sodium of 138, potassium of 4.9, BUN of

22, and creatinine of 0.7.  Random glucose is 122.  Troponin is less than

0.01.  Urinalysis shows protein in the urine and blood.



ASSESSMENT AND PLAN:  Ms. Nohelia Castañeda is 88 years old my private patient,

has hyperglycemia, proteinuria and hematuria.  Toxicology is negative with

CAT scan of the head, results are pending.  She came with like syncopal

attack/seizures, atrial fibrillation, she is on Coumadin, hypertension,

hypercholesterolemia, dementia, chronic obstructive pulmonary disease, and

diabetes mellitus.  Neurology consult called and discussion done with the

patient's friend Kira and Emergency Room physician.  The patient is on fall

precautions.  Chest x-ray done that shows mild bibasilar atelectasis;

however, noted that left lung base remains partially obscured by multiple

tiny calcification of the left breast of uncertain etiology, rule out

inflammatory process.  We will keep the patient overnight and neurology

consult will follow up.  The patient lives alone and she is not able to

live alone, but she want to live alone.  Last time, we put her in [facility] for physical therapy.  The patient's family has one brother and

niece.  Last time length of time discussion was held with everybody and she

was put on Assisted Living Facility, but the patient do not like that is

why she was discharged back to home.  We will continue follow up.







__________________________________________

Jennifer Muller MD



DD:  07/26/2017 22:43:27

DT:  07/27/2017 5:07:46

Job # [id number]





[email or web address]

## 2017-07-27 NOTE — MRI
EXAM:

  MR Head Without Intravenous Contrast



CLINICAL HISTORY:

  The patient age is 88 years old and is female; Signs and symptoms; Altered 

mental status/memory loss; Patient HX: Seizure. Limited study patient moving a 

lot. Patient very confused. Post contrast not done. Pre contrast brain is very 

limited due to notion. Nurse aishwarya was advised Facility exam id and description: 

Mri br s brain without contrast



TECHNIQUE:

  Magnetic resonance images of the head/brain without intravenous contrast in 

multiple planes.



EXAM DATE/TIME:

  7/27/2017 12:05 PM



COMPARISON:

  CT - HEAD W/O CONTRAST 7/26/2017 6:53:20 PM



FINDINGS:

  Artifacts:  Motion artifact significantly limits this study.

  Brain:  There is no restricted diffusion within the brain to suggest acute 

ischemic change.  There are scattered foci of high FLAIR signal intensity 

within the cerebral white matter, with periventricular hyperintensity.  There 

is no mass effect or restricted diffusion associated with these findings.  In a 

patient this age, this likely represents chronic small vessel ischemic disease. 

 Motion artifact limits evaluation of the cerebral white matter. There is 

prominence of the ventricles and sulci, compatible with atrophy.  Foci of T2 

hyperintensity are seen within the bilateral basal ganglia, consistent with 

chronic ischemic changes.

  Ventricles:  There is mild ventriculomegaly.

  Bones/joints:  No acute abnormality.

  Sinuses:  There is mild mucosal thickening of scattered ethmoid air cells.  

No acute sinusitis.

  Mastoid air cells:  No mastoid effusion.

  Orbits:  No acute abnormality, as visualized.



IMPRESSION:     

1.  There is no restricted diffusion within the brain to suggest acute ischemic 

change.  

2.  There are scattered foci of high FLAIR signal intensity within the cerebral 

white matter, with periventricular hyperintensity.  In a patient this age, this 

likely represents chronic small vessel ischemic disease.  

3.  Atrophy.  There is mild ventriculomegaly.

4.  Foci of T2 hyperintensity are seen within the bilateral basal ganglia, 

consistent with chronic ischemic changes.  

5.  Paranasal sinus disease is noted above.

## 2017-07-28 LAB
ALBUMIN SERPL-MCNC: 4 G/DL (ref 3–4.8)
ALBUMIN/GLOB SERPL: 1.1 {RATIO} (ref 1.1–1.8)
ALT SERPL-CCNC: 24 U/L (ref 7–56)
AST SERPL-CCNC: 28 U/L (ref 15–39)
BUN SERPL-MCNC: 21 MG/DL (ref 7–21)
CALCIUM SERPL-MCNC: 9 MG/DL (ref 8.4–10.5)
GFR NON-AFRICAN AMERICAN: > 60
INR PPP: 1.06 (ref 0.93–1.08)
PROTHROMBIN TIME: 11.4 SECONDS (ref 9.9–11.8)

## 2017-07-28 RX ADMIN — PANTOPRAZOLE SODIUM SCH MG: 40 TABLET, DELAYED RELEASE ORAL at 05:44

## 2017-07-28 RX ADMIN — Medication SCH MG: at 10:49

## 2017-07-28 RX ADMIN — INSULIN HUMAN SCH UNITS: 100 INJECTION, SOLUTION PARENTERAL at 12:31

## 2017-07-28 RX ADMIN — INSULIN HUMAN SCH: 100 INJECTION, SOLUTION PARENTERAL at 17:00

## 2017-07-28 RX ADMIN — CEFTRIAXONE SCH MLS/HR: 1 INJECTION, SOLUTION INTRAVENOUS at 18:01

## 2017-07-28 RX ADMIN — INSULIN HUMAN SCH UNITS: 100 INJECTION, SOLUTION PARENTERAL at 08:20

## 2017-07-28 RX ADMIN — INSULIN HUMAN SCH: 100 INJECTION, SOLUTION PARENTERAL at 21:27

## 2017-07-28 RX ADMIN — POLYETHYLENE GLYCOL 3350 SCH: 17 POWDER, FOR SOLUTION ORAL at 10:50

## 2017-07-28 NOTE — PN
SUBJECTIVE:  The patient seen and examined at the bedside, sleepy

arousable, moving all four extremities.  No focal deficit.  No nausea,

vomiting, or diarrhea.  No hematuria and no hematochezia.  No headaches or

dizziness.  The patient is very poor historian.



PHYSICAL EXAMINATION:

VITAL SIGNS:  Temperature of 98.5, pulse of 95, blood pressure of 143/78,

respiratory rate of 18.

HEENT:  Head is normocephalic and atraumatic.  Eyes:  PERRLA.  Extraocular

movements intact.  Conjunctivae are clear.  Nose is patent.

NECK:  Supple.  No carotid bruit or thyromegaly.

CHEST:  Bilaterally symmetrical.

HEART:  S1 and S2 positive.

LUNGS:  Clear to auscultation.

ABDOMEN:  Soft.  Bowel sounds are present.  No organomegaly.

EXTREMITIES:  No edema and no cyanosis.

NEUROLOGIC:  The patient is sleepy, but arousable, moving all four

extremities.  No focal deficits.



MEDICATIONS:  Aricept, Sotalol, Coumadin, losartan, insulin, sliding scale,

Levemir, magnesium oxide, MiraLax, Protonix, Risperdal.



LABORATORY DATA:  Sodium of 137, potassium of 4.0, BUN of 21, creatinine of

0.7, glucose is 151, INR 1.06.



ASSESSMENT AND PLAN:  The patient is an 88-years-old lady with multiple

medical problems, atrial fibrillation, is on Coumadin, but is noncompliant

with Coumadin, history of hypertension, diabetes mellitus controlled with

oral hypoglycemics at home, hypercholesterolemia, anxiety, history of

breast cancer, status post mastectomy, chronic hearing difficulties,

cognitive impairment, has a history of multiple times falls  and  seizures.

EEG was done on 05/12/2017 .  CAT scan of the head done, showed no

acute findings.  The seizure could be secondary to the transient cerebral

hypoperfusion and neuro degenerative.  EEG done on 07/27/2017 was

unremarkable for epileptiform activity.  The patient is getting Keppra,

avoid sedative medication; delirium, fall and seizure precautions.  Needs

physical therapy.  The patient lives alone.  We will discuss with the

family, now she cannot live alone because of presence of multiple falls, is

getting Coumadin and now getting seizures.  We will follow up.







__________________________________________

Jennifer Muller MD





DD:  07/28/2017 11:40:32

DT:  07/28/2017 12:42:00

Job # [id number]

MTDD

## 2017-07-28 NOTE — CP.PCM.PN
<Edin Bess - Last Filed: 07/28/17 14:06>





Subjective





- Date & Time of Evaluation


Date of Evaluation: 07/28/17


Time of Evaluation: 07:30





- Subjective


Subjective: 





PGY-1 Neurology Progress Note for Dr. Valera's service:





Patient seen and examined at bedside. Patient reports that she feels fine. 

Patient is still confused. No acute events overnight.





Objective





- Vital Signs/Intake and Output


Vital Signs (last 24 hours): 


 











Temp Pulse Resp BP Pulse Ox


 


 98.8 F   82   18   155/79 H  91 L


 


 07/28/17 06:00  07/28/17 06:00  07/28/17 06:00  07/28/17 06:00  07/28/17 06:00








Intake and Output: 


 











 07/28/17 07/28/17





 06:59 18:59


 


Intake Total 600 


 


Balance 600 














- Medications


Medications: 


 Current Medications





Donepezil HCl (Aricept)  5 mg PO HS [facility]


   Last Admin: 07/28/17 01:05 Dose:  Not Given


Insulin Human Regular (Humulin R Low)  0 units SC ACHS [facility]


   PRN Reason: Protocol


   Last Admin: 07/28/17 08:20 Dose:  1 units


Levetiracetam (Keppra)  500 mg PO BID [facility]


   Last Admin: 07/27/17 17:48 Dose:  500 mg


Losartan Potassium (Cozaar)  100 mg PO DAILY [facility]


   Last Admin: 07/27/17 10:03 Dose:  100 mg


Magnesium Oxide (Mag-Ox)  400 mg PO DAILY [facility]


   Last Admin: 07/27/17 10:03 Dose:  400 mg


Pantoprazole Sodium (Protonix Ec Tab)  40 mg PO 0600 [facility]


   Last Admin: 07/28/17 05:44 Dose:  40 mg


Polyethylene Glycol (Miralax)  17 gm PO DAILY [facility]


   Last Admin: 07/27/17 10:04 Dose:  17 gm


Risperidone (Risperdal Tab)  0.5 mg PO [facility]


   PRN Reason: Protocol


   Last Admin: 07/28/17 01:06 Dose:  Not Given


Sotalol HCl (Betapace)  80 mg PO BID [facility]


   Last Admin: 07/27/17 17:48 Dose:  80 mg


Warfarin Sodium (Coumadin)  3 mg PO 1800 [facility]


   PRN Reason: Protocol


   Last Admin: 07/27/17 17:47 Dose:  3 mg











- Labs


Labs: 


 





 07/28/17 05:30 





 











PT  11.4 Seconds (9.9-11.8)   07/27/17  16:21    


 


INR  1.06  (0.93-1.08)   07/27/17  16:21    


 


APTT  24.7 Seconds (23.7-30.8)   07/26/17  17:10    














- Constitutional


Appears: Other (drowsy)





- Head Exam


Head Exam: ATRAUMATIC, NORMAL INSPECTION, NORMOCEPHALIC





- Eye Exam


Eye Exam: EOMI, PERRL





- ENT Exam


ENT Exam: Mucous Membranes Dry





- Respiratory Exam


Respiratory Exam: Clear to Ausculation Bilateral





- Cardiovascular Exam


Cardiovascular Exam: REGULAR RHYTHM





- GI/Abdominal Exam


GI & Abdominal Exam: Normal Bowel Sounds





- Neurological Exam


Additional comments: 





Patient is alert to person, place, but not time. Patient is still confused.


EOM intact.


Moving all 4 extremities.


Ability to follow directions is intermittent due to present mental status.


No pronator drift.


Reflexes 2/4 throughout.





Assessment and Plan





- Assessment and Plan (Free Text)


Assessment: 





This is an 88 year old female with PMHx HTN, A-fib, DM, HLD, anxiety, breast 

cancer s/p mastectomy, chronic hearing difficulties, cognitive impairment who 

presented to the hospital after a fall. It is possible that this is a seizure 

episode, although previous EEG in 5/12/17 revealed diffuse slowing and 

bilateral cerebral dysfunction with no epileptiform activity. Head CT showed no 

acute findings. Likely the seizure could be secondary to transient cerebral 

hypoperfusion and neurodegenerative. EEG done 7/27/17 was unremarkable for 

epileptiform activity. MRI Brain showed chronic ischemic changes. Patient has 

UTI and found with gram negative rods which is likely contributing to her 

confusional mental status and provoking breakthrough seizures.  





Plan: 





1) Keppra 500 mg BID


2) Avoid sedative medications


3) delirium, fall, seizure precautions


4) PT evaluation and treatment


5) Avoid hypoglycemic or hyperglycemic states. Needs better management of 

diabetes.


6) ASA 81 mg for stroke prevention


7) Medical management for UTI





*Try to avoid overuse of antipsychotics as they can lower the seizure threshold.








Case Discussed and chart reviewed with Dr. Moraima Bess, PGY-1





<Chris Valera - Last Filed: 07/28/17 14:17>





Objective





- Vital Signs/Intake and Output


Vital Signs (last 24 hours): 


 











Temp Pulse Resp BP Pulse Ox


 


 98.2 F   75   20   135/78   91 L


 


 07/28/17 12:00  07/28/17 12:00  07/28/17 12:00  07/28/17 12:00  07/28/17 06:00








Intake and Output: 


 











 07/28/17 07/28/17





 06:59 18:59


 


Intake Total 600 


 


Balance 600 














- Medications


Medications: 


 Current Medications





Donepezil HCl (Aricept)  5 mg PO HS [facility]


   Last Admin: 07/28/17 01:05 Dose:  Not Given


Insulin Human Regular (Humulin R Low)  0 units SC ACHS [facility]


   PRN Reason: Protocol


   Last Admin: 07/28/17 12:31 Dose:  1 units


Levetiracetam (Keppra)  500 mg PO BID [facility]


   Last Admin: 07/28/17 10:55 Dose:  500 mg


Losartan Potassium (Cozaar)  100 mg PO DAILY [facility]


   Last Admin: 07/28/17 10:49 Dose:  100 mg


Magnesium Oxide (Mag-Ox)  400 mg PO DAILY [facility]


   Last Admin: 07/28/17 10:49 Dose:  400 mg


Pantoprazole Sodium (Protonix Ec Tab)  40 mg PO 0600 [facility]


   Last Admin: 07/28/17 05:44 Dose:  40 mg


Polyethylene Glycol (Miralax)  17 gm PO DAILY [facility]


   Last Admin: 07/28/17 10:50 Dose:  Not Given


Risperidone (Risperdal Tab)  0.5 mg PO AMHS [facility]


   PRN Reason: Protocol


   Last Admin: 07/28/17 10:55 Dose:  Not Given


Sotalol HCl (Betapace)  80 mg PO BID [facility]


   Last Admin: 07/28/17 10:49 Dose:  80 mg


Warfarin Sodium (Coumadin)  3 mg PO 1800 [facility]


   PRN Reason: Protocol


   Last Admin: 07/27/17 17:47 Dose:  3 mg











- Labs


Labs: 


 





 07/28/17 05:30 





 











PT  11.4 Seconds (9.9-11.8)   07/27/17  16:21    


 


INR  1.06  (0.93-1.08)   07/27/17  16:21    


 


APTT  24.7 Seconds (23.7-30.8)   07/26/17  17:10    














Attending/Attestation





- Attestation


I have personally seen and examined this patient.: Yes


I have fully participated in the care of the patient.: Yes


I have reviewed all pertinent clinical information, including history, physical 

exam and plan: Yes

## 2017-07-29 LAB
ALBUMIN SERPL-MCNC: 3.6 G/DL (ref 3–4.8)
ALBUMIN/GLOB SERPL: 1.1 {RATIO} (ref 1.1–1.8)
ALT SERPL-CCNC: 21 U/L (ref 7–56)
AST SERPL-CCNC: 25 U/L (ref 15–39)
BUN SERPL-MCNC: 27 MG/DL (ref 7–21)
CALCIUM SERPL-MCNC: 8.8 MG/DL (ref 8.4–10.5)
GFR NON-AFRICAN AMERICAN: > 60
INR PPP: 1.07 (ref 0.93–1.08)
PROTHROMBIN TIME: 11.6 SECONDS (ref 9.9–11.8)

## 2017-07-29 RX ADMIN — PANTOPRAZOLE SODIUM SCH MG: 40 TABLET, DELAYED RELEASE ORAL at 05:09

## 2017-07-29 RX ADMIN — CEFTRIAXONE SCH MLS/HR: 1 INJECTION, SOLUTION INTRAVENOUS at 10:08

## 2017-07-29 RX ADMIN — INSULIN HUMAN SCH: 100 INJECTION, SOLUTION PARENTERAL at 18:00

## 2017-07-29 RX ADMIN — Medication SCH MG: at 10:08

## 2017-07-29 RX ADMIN — INSULIN HUMAN SCH: 100 INJECTION, SOLUTION PARENTERAL at 21:43

## 2017-07-29 RX ADMIN — POLYETHYLENE GLYCOL 3350 SCH GM: 17 POWDER, FOR SOLUTION ORAL at 10:08

## 2017-07-29 RX ADMIN — INSULIN HUMAN SCH UNITS: 100 INJECTION, SOLUTION PARENTERAL at 08:56

## 2017-07-29 RX ADMIN — INSULIN HUMAN SCH UNITS: 100 INJECTION, SOLUTION PARENTERAL at 13:46

## 2017-07-29 NOTE — CP.PCM.PN
Subjective





- Date & Time of Evaluation


Date of Evaluation: 07/29/17


Time of Evaluation: 10:00





- Subjective


Subjective: 








Patient seen and examined at bedside. Patient reports that she feels fine. 

Patient is still confused. No acute events overnight.








Objective





- Vital Signs/Intake and Output


Vital Signs (last 24 hours): 


 











Temp Pulse Resp BP Pulse Ox


 


 97.8 F   63   19   159/61 H  96 


 


 07/29/17 17:47  07/29/17 18:00  07/29/17 17:47  07/29/17 17:59  07/29/17 05:44











- Medications


Medications: 


 Current Medications





Donepezil HCl (Aricept)  5 mg PO HS [facility]


   Last Admin: 07/28/17 21:36 Dose:  5 mg


Ceftriaxone Sodium (Rocephin 1 Gram Ivpb)  1 gm in 100 mls @ 100 mls/hr IVPB 

DAILY [facility]


   PRN Reason: Protocol


   Last Admin: 07/29/17 10:08 Dose:  100 mls/hr


Insulin Human Regular (Humulin R Low)  0 units SC ACHS [facility]


   PRN Reason: Protocol


   Last Admin: 07/29/17 18:00 Dose:  Not Given


Levetiracetam (Keppra)  500 mg PO BID [facility]


   Last Admin: 07/29/17 18:01 Dose:  500 mg


Losartan Potassium (Cozaar)  100 mg PO DAILY [facility]


   Last Admin: 07/29/17 10:12 Dose:  100 mg


Magnesium Oxide (Mag-Ox)  400 mg PO DAILY [facility]


   Last Admin: 07/29/17 10:08 Dose:  400 mg


Pantoprazole Sodium (Protonix Ec Tab)  40 mg PO 0600 [facility]


   Last Admin: 07/29/17 05:09 Dose:  40 mg


Polyethylene Glycol (Miralax)  17 gm PO DAILY [facility]


   Last Admin: 07/29/17 10:08 Dose:  17 gm


Risperidone (Risperdal Tab)  0.5 mg PO AMHS PRN; Protocol


   PRN Reason: Agitation


Sotalol HCl (Betapace)  80 mg PO BID [facility]


   Last Admin: 07/29/17 17:59 Dose:  80 mg


Warfarin Sodium (Coumadin)  3 mg PO 1800 [facility]


   PRN Reason: Protocol


   Last Admin: 07/29/17 17:58 Dose:  3 mg











- Labs


Labs: 


 





 07/29/17 07:35 





 











PT  11.6 Seconds (9.9-11.8)   07/29/17  15:15    


 


INR  1.07  (0.93-1.08)   07/29/17  15:15    


 


APTT  24.7 Seconds (23.7-30.8)   07/26/17  17:10    














- Constitutional


Appears: Well





- Head Exam


Head Exam: ATRAUMATIC, NORMAL INSPECTION, NORMOCEPHALIC





- Eye Exam


Eye Exam: EOMI, Normal appearance, PERRL


Pupil Exam: NORMAL ACCOMODATION, PERRL





- ENT Exam


ENT Exam: Mucous Membranes Moist, Normal Exam





- Neck Exam


Neck Exam: Full ROM, Normal Inspection.  absent: Lymphadenopathy





- Respiratory Exam


Respiratory Exam: Clear to Ausculation Bilateral, NORMAL BREATHING PATTERN





- Cardiovascular Exam


Cardiovascular Exam: REGULAR RHYTHM, +S1, +S2.  absent: Murmur





- GI/Abdominal Exam


GI & Abdominal Exam: Soft, Normal Bowel Sounds.  absent: Tenderness





- Rectal Exam


Rectal Exam: NORMAL INSPECTION





-  Exam


 Exam: Circumcision, NORMAL INSPECTION


External exam: NORMAL EXTERNAL EXAM


Speculum exam: NORMAL SPECULUM EXAM


Bimanual exam: NORMAL BIMANUAL EXAM





- Extremities Exam


Extremities Exam: Full ROM, Normal Capillary Refill, Normal Inspection.  absent

: Joint Swelling, Pedal Edema





- Back Exam


Back Exam: NORMAL INSPECTION





- Neurological Exam


Neurological Exam: Alert, Awake, CN II-XII Intact, Normal Gait, Oriented x3





- Psychiatric Exam


Psychiatric exam: Normal Affect, Normal Mood





- Skin


Skin Exam: Dry, Intact, Normal Color, Warm





Assessment and Plan





- Assessment and Plan (Free Text)


Plan: 





This is an 88 year old female with PMHx HTN, A-fib, DM, HLD, anxiety, breast 

cancer s/p mastectomy, chronic hearing difficulties, cognitive impairment who 

presented to the hospital after a fall. It is possible that this is a seizure 

episode, although previous EEG in 5/12/17 revealed diffuse slowing and 

bilateral cerebral dysfunction with no epileptiform activity. Head CT showed no 

acute findings. Likely the seizure could be secondary to transient cerebral 

hypoperfusion and neurodegenerative. EEG done 7/27/17 was unremarkable for 

epileptiform activity. MRI Brain showed chronic ischemic changes. Patient has 

UTI and found with gram negative rods which is likely contributing to her 

confusional mental status and provoking breakthrough seizures.  





Plan: 





1) Keppra 500 mg BID


2) Avoid sedative medications


3) delirium, fall, seizure precautions


4) PT evaluation and treatment


5) Avoid hypoglycemic or hyperglycemic states. Needs better management of 

diabetes.


6) ASA 81 mg for stroke prevention


7) Medical management for UTI





*Try to avoid overuse of antipsychotics as they can lower the seizure threshold.

## 2017-07-30 VITALS — RESPIRATION RATE: 20 BRPM

## 2017-07-30 RX ADMIN — CEFTRIAXONE SCH MLS/HR: 1 INJECTION, SOLUTION INTRAVENOUS at 09:38

## 2017-07-30 RX ADMIN — INSULIN HUMAN SCH UNITS: 100 INJECTION, SOLUTION PARENTERAL at 11:38

## 2017-07-30 RX ADMIN — PANTOPRAZOLE SODIUM SCH MG: 40 TABLET, DELAYED RELEASE ORAL at 05:10

## 2017-07-30 RX ADMIN — Medication SCH MG: at 09:38

## 2017-07-30 RX ADMIN — INSULIN HUMAN SCH UNITS: 100 INJECTION, SOLUTION PARENTERAL at 16:53

## 2017-07-30 RX ADMIN — INSULIN HUMAN SCH: 100 INJECTION, SOLUTION PARENTERAL at 08:24

## 2017-07-30 RX ADMIN — INSULIN HUMAN SCH: 100 INJECTION, SOLUTION PARENTERAL at 21:28

## 2017-07-30 RX ADMIN — POLYETHYLENE GLYCOL 3350 SCH GM: 17 POWDER, FOR SOLUTION ORAL at 09:38

## 2017-07-31 ENCOUNTER — HOSPITAL ENCOUNTER (INPATIENT)
Dept: HOSPITAL 42 - TRCU | Age: 82
LOS: 8 days | Discharge: HOME | DRG: 101 | End: 2017-08-08
Attending: INTERNAL MEDICINE | Admitting: INTERNAL MEDICINE
Payer: COMMERCIAL

## 2017-07-31 VITALS — OXYGEN SATURATION: 93 %

## 2017-07-31 VITALS — TEMPERATURE: 98.2 F | DIASTOLIC BLOOD PRESSURE: 76 MMHG | HEART RATE: 110 BPM | SYSTOLIC BLOOD PRESSURE: 168 MMHG

## 2017-07-31 VITALS — BODY MASS INDEX: 25 KG/M2

## 2017-07-31 DIAGNOSIS — B96.89: ICD-10-CM

## 2017-07-31 DIAGNOSIS — F03.90: ICD-10-CM

## 2017-07-31 DIAGNOSIS — E78.5: ICD-10-CM

## 2017-07-31 DIAGNOSIS — N39.0: ICD-10-CM

## 2017-07-31 DIAGNOSIS — I10: ICD-10-CM

## 2017-07-31 DIAGNOSIS — Z88.2: ICD-10-CM

## 2017-07-31 DIAGNOSIS — F41.9: ICD-10-CM

## 2017-07-31 DIAGNOSIS — H91.90: ICD-10-CM

## 2017-07-31 DIAGNOSIS — G31.84: ICD-10-CM

## 2017-07-31 DIAGNOSIS — Z79.01: ICD-10-CM

## 2017-07-31 DIAGNOSIS — E11.9: ICD-10-CM

## 2017-07-31 DIAGNOSIS — Z85.3: ICD-10-CM

## 2017-07-31 DIAGNOSIS — I48.91: ICD-10-CM

## 2017-07-31 DIAGNOSIS — R56.9: Primary | ICD-10-CM

## 2017-07-31 LAB
INR PPP: 1.13 (ref 0.93–1.08)
PROTHROMBIN TIME: 12.2 SECONDS (ref 9.9–11.8)

## 2017-07-31 RX ADMIN — INSULIN HUMAN SCH: 100 INJECTION, SOLUTION PARENTERAL at 21:40

## 2017-07-31 RX ADMIN — PANTOPRAZOLE SODIUM SCH MG: 40 TABLET, DELAYED RELEASE ORAL at 05:33

## 2017-07-31 RX ADMIN — POLYETHYLENE GLYCOL 3350 SCH GM: 17 POWDER, FOR SOLUTION ORAL at 09:03

## 2017-07-31 RX ADMIN — CEFTRIAXONE SCH MLS/HR: 1 INJECTION, SOLUTION INTRAVENOUS at 09:03

## 2017-07-31 RX ADMIN — INSULIN HUMAN SCH UNITS: 100 INJECTION, SOLUTION PARENTERAL at 17:19

## 2017-07-31 RX ADMIN — Medication SCH MG: at 09:03

## 2017-07-31 RX ADMIN — INSULIN HUMAN SCH UNITS: 100 INJECTION, SOLUTION PARENTERAL at 08:01

## 2017-07-31 RX ADMIN — INSULIN HUMAN SCH UNITS: 100 INJECTION, SOLUTION PARENTERAL at 11:58

## 2017-07-31 NOTE — CP.PCM.CON
History of Present Illness





- History of Present Illness


History of Present Illness: 





Infectious Disease Consultation:


July 31, 2017





89 yo  female with extensive medical history that includes hypertension

, atrial fibrillation, diabetes, hyperlipidemia, anxiety, breast cancer history

, chronic hearing difficulties, and cognitive impairment sent to hospital after 

a fall at home.  She was evaluated for potential seizures.  A urinary tract 

infection with Morganella was found and is currently being treated with 

Rocephin.  The patient was transferred to the [facility] for deconditioned state.  My 

understanding is that the patient lives alone.  It appears that the patient may 

not be able to live on her own.





PMHx:


Hypertension, Atrial Fibrillation, Diabetes Mellitus, Hyperlipidemia, Anxiety, 

Breast Cancer, chronic hearing difficult/loss, cognitive impairment





PSHx:


mastectomy





Allergies:


Fish and sulfa drugs





Social Hx:


Unable to Obtain from the patient





Active Medications





Donepezil HCl (Aricept)  5 mg PO HS [facility]


   PRN Reason: Protocol


Ceftriaxone Sodium (Rocephin 1 Gram Ivpb)  1 gm in 100 mls @ 100 mls/hr IVPB 

DAILY [facility]


   PRN Reason: Protocol


Insulin Human Regular (Humulin R Low)  0 units SC ACHS [facility]


   PRN Reason: Protocol


   Last Admin: 07/31/17 17:19 Dose:  1 units


Levetiracetam (Keppra)  500 mg PO BID [facility]


   PRN Reason: Protocol


   Last Admin: 07/31/17 17:47 Dose:  500 mg


Losartan Potassium (Cozaar)  100 mg PO DAILY [facility]


   PRN Reason: Protocol


Magnesium Oxide (Mag-Ox)  400 mg PO DAILY [facility]


   PRN Reason: Protocol


Pantoprazole Sodium (Protonix Ec Tab)  40 mg PO 0600 [facility]


   PRN Reason: Protocol


Polyethylene Glycol (Miralax)  17 gm PO DAILY [facility]


   PRN Reason: Protocol


Risperidone (Risperdal Tab)  0.5 mg PO AM[facility] PRN; Protocol


   PRN Reason: Agitation


   Last Admin: 07/31/17 19:55 Dose:  0.5 mg


Sotalol HCl (Betapace)  80 mg PO BID [facility]


   PRN Reason: Protocol


   Last Admin: 07/31/17 17:47 Dose:  80 mg


Warfarin Sodium (Coumadin)  3 mg PO 1800 [facility]


   PRN Reason: Protocol


   Last Admin: 07/31/17 17:47 Dose:  3 mg





Family Hx:


none given





ROS:


Unable to Obtain from patient








Past Patient History





- Infectious Disease


Hx of Infectious Diseases: None





- Tetanus Immunizations


Tetanus Immunization: Unknown





- Past Social History


Smoking Status: Never Smoked





- CARDIAC


Hx Cardiac Disorders: Yes (Afib (on Coumadin))


Hx Hypercholesterolemia: Yes


Hx Hypertension: Yes





- PULMONARY


Hx Chronic Obstructive Pulmonary Disease (COPD): Yes





- NEUROLOGICAL


HX Cerebrovascular Accident: Yes





- HEENT


Hx HEENT Problems: Yes (Hard of hearing)





- RENAL


Hx Chronic Kidney Disease: No





- ENDOCRINE/METABOLIC


Hx Diabetes Mellitus Type 2: Yes





- HEMATOLOGICAL/ONCOLOGICAL


Hx Cancer: Yes (Breast CA)


Hx Chemotherapy: Yes





- INTEGUMENTARY


Hx Dermatological Problems: No





- MUSCULOSKELETAL/RHEUMATOLOGICAL


Hx Falls: Yes


Hx Fractures: Yes (Right ankle)


Hx Osteoarthritis: Yes





- GASTROINTESTINAL


Hx Gastrointestinal Disorders: Yes (POOR APPETTITE,)





- GENITOURINARY/GYNECOLOGICAL


Hx Urinary Tract Infection: Yes





- PSYCHIATRIC


Hx Substance Use: No





- SURGICAL HISTORY


Hx Mastectomy: Yes





- ANESTHESIA


Hx Anesthesia: Yes


Hx Anesthesia Reactions: No


Hx Malignant Hyperthermia: No





Meds


Allergies/Adverse Reactions: 


 Allergies











Allergy/AdvReac Type Severity Reaction Status Date / Time


 


FISH Allergy  NAUSEA Verified 07/26/17 17:07


 


Sulfa (Sulfonamide Allergy  HEADACHE Verified 07/26/17 17:07





Antibiotics)     














- Medications


Medications: 


 Current Medications





Donepezil HCl (Aricept)  5 mg PO HS [facility]


   PRN Reason: Protocol


Ceftriaxone Sodium (Rocephin 1 Gram Ivpb)  1 gm in 100 mls @ 100 mls/hr IVPB 

DAILY [facility]


   PRN Reason: Protocol


Insulin Human Regular (Humulin R Low)  0 units SC ACHS [facility]


   PRN Reason: Protocol


   Last Admin: 07/31/17 17:19 Dose:  1 units


Levetiracetam (Keppra)  500 mg PO BID [facility]


   PRN Reason: Protocol


   Last Admin: 07/31/17 17:47 Dose:  500 mg


Losartan Potassium (Cozaar)  100 mg PO DAILY [facility]


   PRN Reason: Protocol


Magnesium Oxide (Mag-Ox)  400 mg PO DAILY [facility]


   PRN Reason: Protocol


Pantoprazole Sodium (Protonix Ec Tab)  40 mg PO 0600 [facility]


   PRN Reason: Protocol


Polyethylene Glycol (Miralax)  17 gm PO DAILY [facility]


   PRN Reason: Protocol


Risperidone (Risperdal Tab)  0.5 mg PO [facility] PRN; Protocol


   PRN Reason: Agitation


Sotalol HCl (Betapace)  80 mg PO BID [facility]


   PRN Reason: Protocol


   Last Admin: 07/31/17 17:47 Dose:  80 mg


Warfarin Sodium (Coumadin)  3 mg PO 1800 [facility]


   PRN Reason: Protocol


   Last Admin: 07/31/17 17:47 Dose:  3 mg











Physical Exam





- Constitutional


Appears: Non-toxic, No Acute Distress, Confused, Chronically Ill





- Head Exam


Head Exam: ATRAUMATIC, NORMOCEPHALIC





- Eye Exam


Eye Exam: EOMI, PERRL


Pupil Exam: NORMAL ACCOMODATION, PERRL





- ENT Exam


ENT Exam: Mucous Membranes Moist, Normal External Ear Exam, TM's Normal 

Bilaterally





- Neck Exam


Neck exam: Positive for: Full Rom, Normal Inspection





- Respiratory Exam


Respiratory Exam: Clear to Auscultation Bilateral, NORMAL BREATHING PATTERN.  

absent: Rales, Rhonchi, Wheezes





- Cardiovascular Exam


Cardiovascular Exam: REGULAR RHYTHM, RRR, +S1, +S2





- GI/Abdominal Exam


GI & Abdominal Exam: Normal Bowel Sounds, Soft.  absent: Distended, Tenderness





- Extremities Exam


Additional comments: 





No c/c/e.  adequate distal pulses.





- Neurological Exam


Neurological exam: Alert, CN II-XII Intact


Additional comments: 





AAO x 1-2, slow recognition and thought process.  Very forgetful.  Highly 

agitated when I saw the patient.





- Psychiatric Exam


Psychiatric exam: Agitated, Anxious





- Skin


Skin Exam: Intact, Normal Color





Results





- Vital Signs


Recent Vital Signs: 


 Last Vital Signs











Temp  97.6 F   07/31/17 15:30


 


Pulse  60   07/31/17 17:47


 


Resp  14   07/31/17 15:30


 


BP  162/78 H  07/31/17 17:47


 


Pulse Ox  96   07/31/17 15:30














Assessment & Plan





- Assessment and Plan (Free Text)


Assessment: 





89 yo  female with a fall at home and possible seizure and current 

finding of UTI with Morganella.  Started on Rocephin for antibiotic treatment.  

Supportive care.





Patient on Keppra for seizure prophylaxis.





Patient is currently agitated and anxious.





Continue with Rocephin for treatment.





Thank you for allowing me to participate in the care of the patient, we will 

follow with you.

## 2017-07-31 NOTE — CON
DATE:  07/31/2017



CHIEF COMPLAINT:  Status post fall.



HISTORY OF PRESENT ILLNESS:  The patient is an 88-year-old woman with

history of hypertension, AFib, diabetes, hyperlipidemia, anxiety, breast

cancer, status post mastectomy, chronic hearing difficulties, cognitive

impairment presenting to the hospital after a fall.  It is questionable if

she had a possible seizure episode; therefore, she had an EEG on 05/12/2017

which revealed diffuse slowing and considered bilateral cerebral

dysfunction.  No evidence of any epileptiform activity.  CT head showed no

acute intracranial abnormalities.  MRI showed chronic ischemic changes.  No

acute abnormalities.  She was found to have urinary tract infection with

gram-negative rods contributing to her confusional state and possibly

causing breakthrough seizures.  She is currently in TCU for deconditioned

state.  She is on Keppra 500 mg p.o. b.i.d. for seizure prophylaxis and

Aricept for cognitive impairment.



PAST MEDICAL HISTORY:  Hypertension, AFib, diabetes, hyperlipidemia,

anxiety, breast cancer status post mastectomy, chronic hearing difficulty,

cognitive impairment.



ALLERGIES:  ALLERGIC TO FISH AND SULFA DRUGS.



REVIEW OF SYSTEMS:  A 14-point review of system is negative except as in

the HPI.



FAMILY HISTORY:  Noncontributory.



MEDICATIONS:  Reviewed by nurse practitioner, see med reconciliation sheet.



PHYSICAL EXAMINATION

VITAL SIGNS:  Temperature 97.6, pulse rate 60, blood pressure 160/78,

respiratory rate of 14, oxygen saturation 96% on room air.

GENERAL:  The patient seen up in bed.  No acute distress.

HEENT:  Atraumatic and normocephalic.  PERRLA.  Extraocular muscles intact.

NECK:  Supple.  No JVD.  No adenopathy noted.

LUNGS:  Clear to auscultation.  No adventitious sounds.

HEART:  S1 and S2.  Normal rate and rhythm.  No murmurs, rubs, or gallops.

ABDOMEN:  Soft, nontender, nondistended.  Bowel sounds are present.

EXTREMITIES:  No clubbing.  No cyanosis.  Peripheral pulses 2+ felt

bilaterally.

NEUROLOGIC:  The patient is alert and oriented to person and place, not

much to time or month.  Recall after 5 minutes 0 out of 3.  Poor attention

span, slow thought process.  Cranial nerves II through XII intact.  Speech

is fluent without any errors.  Motor exam:  Slight increased tone

throughout.  Moves all extremities equally.  No pronator drift seen. 

Sensory exam:  Light touch and pinprick is diffuse up to calves

bilaterally.   Decreased vibration of the toes.  DTRs are 2+ throughout and

1 at the ankles.  Coordination:  Finger-to-nose intact.  Gait is deferred

for now.



LABORATORY DATA:  Sodium is 137, potassium is 4.5, chloride 100, carbon

dioxide 28, BUN of 27, creatinine of 0.8, random glucose of 153.



ASSESSMENT AND PLAN:  This is an 88-year-old woman with past medical

history of hypertension, AFib, diabetes, hyperlipidemia, anxiety, breast

cancer status post mastectomy, chronic hearing difficulties, cognitive

impairment, presenting to the hospital after a fall which she was worked up

to have a possible seizure likely secondary to urinary tract infection

which showed gram-negative rods, but she is on antibiotic and was likely

responsible for intermittent confusional state.  Her EEG on 05/12/2017

revealed bilateral cerebral dysfunction.  No evidence of epileptiform

activity.  MRI brain showed no acute intracranial abnormalities, chronic

ischemic changes.  At this time, the patient had UTI and found to have

gram-negative rods likely contributed to her confusional state and mental

status and provoking questionable breakthrough seizures.  She is in TCU for

underlying deconditioned state for physical and occupational therapy.



At this time recommended:

1.  Continue physical and occupational therapy _____ and rehabilitate the

patient.

2.  Continue with Keppra 500 mg p.o. b.i.d. for seizure prophylaxis.

3.  Delirium precautions.  Avoid nighttime interruptions and avoid sedative

medications.

4.  Avoid hypoglycemic or hyperglycemic state, needs better management of

diabetes.

5.  Continue with aspirin 81 mg for stroke prevention.

6.  Try to avoid overuse of anti-psychotic drugs that can lower the seizure

threshold.

7.  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

continue current present medical management.



Thank you for this consult.  We will follow up as an outpatient.







__________________________________________

Chris Valera MD





DD:  07/31/2017 16:24:08

DT:  07/31/2017 17:23:36

Job # [id number]

## 2017-07-31 NOTE — PN
DATE:  07/30/2017



The patient is an 88 years old female.



SUBJECTIVE:  The patient seen and examined at the bedside, looking

comfortable.  No nausea, vomiting or diarrhea.  No hematochezia.  Feeling

better, but getting episodes of confusion.  Her  friend Kira was sitting on

the bedside also.



PHYSICAL EXAMINATION:

VITAL SIGNS:  Temperature 97.9, pulse 60, blood pressure 140/72, and

respiratory rate 20.

HEENT:  Head is normocephalic and atraumatic.  Eyes:  PERRLA.  Extraocular

movements intact.  Conjunctivae clear.  Nose is patent.  Mucous membrane

moist.

NECK:  Supple.  No carotid bruits or thyromegaly.

CHEST:  Bilaterally symmetrical.

HEART:  S1 and S2 positive.

LUNGS:  Clear to auscultation.

ABDOMEN:  Soft.  Bowel sounds are present.  No organomegaly.

EXTREMITIES:  No edema and no cyanosis.

NEUROLOGIC:  Awake and alert.  Moving all four extremities.  No focal

deficits.



MEDICATIONS:  Aricept, Sotalol, Coumadin, Cozaar, insulin, Keppra,

magnesium oxide, MiraLax, Protonix, Risperdal, Rocephin.



LABORATORY DATA:  White blood cell 10.6, hemoglobin 12.9, hematocrit 37.5,

and platelets 163.  Sodium 137, potassium 4.5, BUN 27, creatinine 0.8, and

glucose of 153.



ASSESSMENT AND PLAN:  Ms. Garry Pozo is an 88 years old lady with

hyperglycemia; history of diabetes mellitus; proteinuria; hematuria;

urinary tract infection, came with seizures, seen by Dr. Chris Valera,

neurologist;history of hypertension; atrial fibrillation,

hypercholesterolemia; anxiety; breast cancer, status post mastectomy

bilaterally; chronic hearing difficulties; cognitive impairment; history of

multiple falls, living alone.  The previous EEG on 05/12/2017 revealed

diffuse slowing and bilateral cerebral dysfunction with no epileptiform

activity.  Head CAT scan showed no acute findings.  Likely, the seizure

could be secondary to transient cerebral hypoperfusion or neuro

degenerative changes.  EEG done on 07/27/2017 was unremarkable for

epileptiform activity.  MRI brain shows chronic ischemic changes.  The

patient had UTI, has gram-negative rods which is likely contributing to her

confusion and mental status and provoking a breakthrough seizure.  We will

continue Keppra, avoid sedative, fall precautions, seizure precautions. 

Continue antibiotics.  We will call ID consult , patient sent to the

TCU for deconditioning.  We will follow up.



Length of time discussion done with the patient's friend, Kira and the

patient was informed that she cannot live alone according to Kira, they are

doing arrangement for 24-hour help at home.





__________________________________________

Jennifer Muller MD





DD:  07/30/2017 22:36:41

DT:  07/30/2017 23:44:33

Job # [id number]





[email or web address]

## 2017-08-01 LAB
ALBUMIN SERPL-MCNC: 4 G/DL (ref 3–4.8)
ALBUMIN/GLOB SERPL: 1.2 {RATIO} (ref 1.1–1.8)
ALT SERPL-CCNC: 24 U/L (ref 7–56)
AST SERPL-CCNC: 28 U/L (ref 15–39)
BASOPHILS # BLD AUTO: 0.09 K/MM3 (ref 0–2)
BASOPHILS NFR BLD: 1.2 % (ref 0–3)
BUN SERPL-MCNC: 27 MG/DL (ref 7–21)
CALCIUM SERPL-MCNC: 9.3 MG/DL (ref 8.4–10.5)
EOSINOPHIL # BLD: 0.3 10*3/UL (ref 0–0.7)
EOSINOPHIL NFR BLD: 4.4 % (ref 1.5–5)
ERYTHROCYTE [DISTWIDTH] IN BLOOD BY AUTOMATED COUNT: 12.3 % (ref 11.5–14.5)
GFR NON-AFRICAN AMERICAN: > 60
GRANULOCYTES # BLD: 4.96 10*3/UL (ref 1.4–6.5)
GRANULOCYTES NFR BLD: 63.5 % (ref 50–68)
HGB BLD-MCNC: 13.5 GM/DL (ref 12–16)
INR PPP: 1.16 (ref 0.93–1.08)
LYMPHOCYTES # BLD: 1.5 10*3/UL (ref 1.2–3.4)
LYMPHOCYTES NFR BLD AUTO: 19.4 % (ref 22–35)
MCH RBC QN AUTO: 29.9 PG (ref 25–35)
MCHC RBC AUTO-ENTMCNC: 34.1 G/DL (ref 31–37)
MCV RBC AUTO: 87.8 FL (ref 80–105)
MONOCYTES # BLD AUTO: 0.9 10*3/UL (ref 0.1–0.6)
MONOCYTES NFR BLD: 11.5 % (ref 1–6)
PLATELET # BLD: 193 10^3/UL (ref 120–450)
PMV BLD AUTO: 9.9 FL (ref 7–11)
PROTHROMBIN TIME: 12.5 SECONDS (ref 9.9–11.8)
RBC # BLD AUTO: 4.51 10^6/UL (ref 3.5–6.1)
WBC # BLD AUTO: 7.8 10^3/UL (ref 4.5–11)

## 2017-08-01 RX ADMIN — POLYETHYLENE GLYCOL 3350 SCH GM: 17 POWDER, FOR SOLUTION ORAL at 10:46

## 2017-08-01 RX ADMIN — Medication SCH MG: at 10:45

## 2017-08-01 RX ADMIN — INSULIN HUMAN SCH UNITS: 100 INJECTION, SOLUTION PARENTERAL at 07:08

## 2017-08-01 RX ADMIN — INSULIN HUMAN SCH: 100 INJECTION, SOLUTION PARENTERAL at 22:21

## 2017-08-01 RX ADMIN — INSULIN HUMAN SCH UNITS: 100 INJECTION, SOLUTION PARENTERAL at 17:10

## 2017-08-01 RX ADMIN — PANTOPRAZOLE SODIUM SCH MG: 40 TABLET, DELAYED RELEASE ORAL at 05:45

## 2017-08-01 RX ADMIN — CEFTRIAXONE SCH: 1 INJECTION, SOLUTION INTRAVENOUS at 10:47

## 2017-08-01 RX ADMIN — INSULIN HUMAN SCH UNITS: 100 INJECTION, SOLUTION PARENTERAL at 12:21

## 2017-08-01 RX ADMIN — CEFTRIAXONE SCH MLS/HR: 1 INJECTION, SOLUTION INTRAVENOUS at 05:45

## 2017-08-01 NOTE — CP.PCM.PN
Subjective





- Date & Time of Evaluation


Date of Evaluation: 08/01/17


Time of Evaluation: 16:45





- Subjective


Subjective: 


Infectious Disease Follow Up:


August 1, 2017





89 yo  female with extensive medical history that includes hypertension

, atrial fibrillation, diabetes, hyperlipidemia, anxiety, breast cancer history

, chronic hearing difficulties, and cognitive impairment sent to hospital after 

a fall at home.  She was evaluated for potential seizures.  A urinary tract 

infection with Morganella was found and is currently being treated with 

Rocephin.  The patient was transferred to the [facility] for deconditioned state.  My 

understanding is that the patient lives alone.  It appears that the patient may 

not be able to live on her own.





She remains distressed when seen today.  Reviewing nursing notes, the patient 

has periods where she is calm.





Objective





- Vital Signs/Intake and Output


Vital Signs (last 24 hours): 


 











Temp Pulse Resp BP Pulse Ox


 


 98.2 F   63   14   156/77 H  94 L


 


 08/01/17 17:14  08/01/17 17:14  08/01/17 17:14  08/01/17 17:14  08/01/17 17:14











- Medications


Medications: 


 Current Medications





Acetaminophen (Tylenol 325mg Tab)  650 mg PO Q6H PRN; Protocol


   PRN Reason: Pain, moderate (4-7)


   Last Admin: 08/01/17 17:15 Dose:  650 mg


Donepezil HCl (Aricept)  5 mg PO HS [facility]


   PRN Reason: Protocol


   Last Admin: 07/31/17 22:11 Dose:  5 mg


Ceftriaxone Sodium (Rocephin 1 Gram Ivpb)  1 gm in 100 mls @ 100 mls/hr IVPB 

0600 [facility]


   PRN Reason: Protocol


Insulin Human Regular (Humulin R Low)  0 units SC ACHS [facility]


   PRN Reason: Protocol


   Last Admin: 08/01/17 17:10 Dose:  3 units


Levetiracetam (Keppra)  500 mg PO BID [facility]


   PRN Reason: Protocol


   Last Admin: 08/01/17 17:17 Dose:  500 mg


Losartan Potassium (Cozaar)  100 mg PO DAILY [facility]


   PRN Reason: Protocol


   Last Admin: 08/01/17 10:45 Dose:  100 mg


Magnesium Oxide (Mag-Ox)  400 mg PO DAILY [facility]


   PRN Reason: Protocol


   Last Admin: 08/01/17 10:45 Dose:  400 mg


Pantoprazole Sodium (Protonix Ec Tab)  40 mg PO 0600 [facility]


   PRN Reason: Protocol


   Last Admin: 08/01/17 05:45 Dose:  40 mg


Polyethylene Glycol (Miralax)  17 gm PO DAILY [facility]


   PRN Reason: Protocol


   Last Admin: 08/01/17 10:46 Dose:  17 gm


Risperidone (Risperdal Tab)  0.5 mg PO AMHS PRN; Protocol


   PRN Reason: Agitation


   Last Admin: 07/31/17 19:55 Dose:  0.5 mg


Sotalol HCl (Betapace)  80 mg PO BID [facility]


   PRN Reason: Protocol


   Last Admin: 08/01/17 17:03 Dose:  80 mg


Warfarin Sodium (Coumadin)  3 mg PO 1800 [facility]


   PRN Reason: Protocol


   Last Admin: 08/01/17 17:08 Dose:  3 mg











- Labs


Labs: 


 





 08/01/17 08:30 





 08/01/17 08:30 





 











PT  12.5 Seconds (9.9-11.8)  H  08/01/17  08:30    


 


INR  1.16  (0.93-1.08)  H  08/01/17  08:30    














- Constitutional


Appears: Non-toxic, No Acute Distress, Chronically Ill





- Head Exam


Head Exam: ATRAUMATIC, NORMOCEPHALIC





- Eye Exam


Eye Exam: EOMI, PERRL


Pupil Exam: NORMAL ACCOMODATION, PERRL





- ENT Exam


ENT Exam: Mucous Membranes Moist, Normal External Ear Exam, TM's Normal 

Bilaterally





- Neck Exam


Neck Exam: Full ROM, Normal Inspection





- Respiratory Exam


Respiratory Exam: Clear to Ausculation Bilateral, NORMAL BREATHING PATTERN.  

absent: Rales, Rhonchi, Wheezes





- Cardiovascular Exam


Cardiovascular Exam: REGULAR RHYTHM, RRR, +S1, +S2





- GI/Abdominal Exam


GI & Abdominal Exam: Soft, Normal Bowel Sounds.  absent: Distended, Tenderness





- Extremities Exam


Additional comments: 





No c/c/e.  adequate distal pulses.





- Neurological Exam


Neurological Exam: Alert, Awake, CN II-XII Intact


Additional comments: 





AAO x 1-2.  slow recognition and thought process.  Very forgetful.  Periods of 

agitation.





- Psychiatric Exam


Psychiatric exam: Agitated, Anxious





- Skin


Skin Exam: Intact, Normal Color





Assessment and Plan





- Assessment and Plan (Free Text)


Assessment: 


89 yo  female with a fall at home and possible seizure and current 

finding of UTI with Morganella.  Started on Rocephin for antibiotic treatment.  

Supportive care.





Patient on Keppra for seizure prophylaxis.





Patient with periods of being agitated and anxious and periods of being calm.





Continue with Rocephin for treatment of UTI.





Thank you for allowing me to participate in the care of the patient, we will 

follow with you.

## 2017-08-01 NOTE — CP.PCM.DIS
Provider





- Provider


Date of Admission: 


07/27/17 , dictating discharge summery for 7/31/17


Attending physician: 


Jennifer Muller MD





Primary care physician: 


Jennifer Muller MD





Time Spent in preparation of Discharge (in minutes): 60





Hospital Course





- Lab Results


Lab Results: 


 Micro Results





07/27/17 16:55   Urine,Clean Catch   Urine Culture - Final


                            Morg Morganii Ss Morganii





 Most Recent Lab Values











WBC  10.6 10^3/ul (4.5-11.0)   07/27/17  06:30    


 


RBC  4.23 10^6/uL (3.5-6.1)   07/27/17  06:30    


 


Hgb  12.9 gm/dL (12.0-16.0)   07/27/17  06:30    


 


Hct  37.5 % (36.0-48.0)   07/27/17  06:30    


 


MCV  88.7 fL (80.0-105.0)   07/27/17  06:30    


 


MCH  30.5 pg (25.0-35.0)   07/27/17  06:30    


 


MCHC  34.4 g/dl (31.0-37.0)   07/27/17  06:30    


 


RDW  12.7 % (11.5-14.5)   07/27/17  06:30    


 


Plt Count  163 10^3/uL (120.0-450.0)   07/27/17  06:30    


 


MPV  9.6 fl (7.0-11.0)   07/27/17  06:30    


 


Gran %  74.4 % (50.0-68.0)  H  07/27/17  06:30    


 


Lymph % (Auto)  13.3 % (22.0-35.0)  L  07/27/17  06:30    


 


Mono % (Auto)  10.3 % (1.0-6.0)  H  07/27/17  06:30    


 


Eos % (Auto)  1.6 % (1.5-5.0)   07/27/17  06:30    


 


Baso % (Auto)  0.4 % (0.0-3.0)   07/27/17  06:30    


 


Gran #  7.89  (1.4-6.5)  H  07/27/17  06:30    


 


Lymph #  1.4  (1.2-3.4)   07/27/17  06:30    


 


Mono #  1.1  (0.1-0.6)  H  07/27/17  06:30    


 


Eos #  0.2  (0.0-0.7)   07/27/17  06:30    


 


Baso #  0.04 K/mm3 (0.0-2.0)   07/27/17  06:30    


 


PT  12.2 Seconds (9.9-11.8)  H  07/31/17  06:10    


 


INR  1.13  (0.93-1.08)  H  07/31/17  06:10    


 


APTT  24.7 Seconds (23.7-30.8)   07/26/17  17:10    


 


pCO2  38 mm/Hg (35-45)   07/27/17  07:15    


 


pO2  59.0 mm/Hg ()  L  07/27/17  07:15    


 


HCO3  23.0 mmol/L (21-28)   07/27/17  07:15    


 


ABG pH  7.39  (7.35-7.45)   07/27/17  07:15    


 


ABG Total CO2  24.2 mmol.L (22-28)   07/27/17  07:15    


 


ABG O2 Saturation  93.8 % (95-98)  L  07/27/17  07:15    


 


ABG O2 Content  15.9 ML/dl (15-23)   07/27/17  07:15    


 


ABG Base Excess  -1.7 mmol/L (-2.0-3.0)   07/27/17  07:15    


 


ABG Hemoglobin  12.4 g/dL (11.7-17.4)   07/27/17  07:15    


 


ABG Carboxyhemoglobin  2.1 % (0.5-1.5)  H  07/27/17  07:15    


 


POC ABG HHb (Measured)  6.0 % (0-5)  H  07/27/17  07:15    


 


ABG Methemoglobin  0.4 % (0.0-3.0)   07/27/17  07:15    


 


ABG O2 Capacity  17.0 mL/dl (16-24)   07/27/17  07:15    


 


Hgb O2 Saturation  91.4 % (95.0-98.0)  L  07/27/17  07:15    


 


FiO2  21.0 %  07/27/17  07:15    


 


Sodium  137 mmol/L (132-148)   07/29/17  07:35    


 


Potassium  4.5 mmol/L (3.6-5.0)   07/29/17  07:35    


 


Chloride  100 mmol/L ()   07/29/17  07:35    


 


Carbon Dioxide  28 mmol/L (21-33)   07/29/17  07:35    


 


Anion Gap  14  (10-20)   07/29/17  07:35    


 


BUN  27 mg/dL (7-21)  H  07/29/17  07:35    


 


Creatinine  0.8 mg/dL (0.5-1.4)   07/29/17  07:35    


 


Est GFR ( Amer)  > 60   07/29/17  07:35    


 


Est GFR (Non-Af Amer)  > 60   07/29/17  07:35    


 


POC Glucose (mg/dL)  137 mg/dL ()  H  07/27/17  21:31    


 


Random Glucose  153 mg/dL ()  H  07/29/17  07:35    


 


Hemoglobin A1c  8.6 % (4.2-6.5)  H  07/27/17  07:30    


 


Calcium  8.8 mg/dL (8.4-10.5)   07/29/17  07:35    


 


Phosphorus  4.3 mg/dL (2.5-4.5)   07/27/17  06:30    


 


Magnesium  1.6 mg/dL (1.7-2.2)  L  07/27/17  06:30    


 


Total Bilirubin  0.8 mg/dL (0.2-1.3)   07/29/17  07:35    


 


AST  25 U/L (15-39)   07/29/17  07:35    


 


ALT  21 U/L (7-56)   07/29/17  07:35    


 


Alkaline Phosphatase  67 U/L ()   07/29/17  07:35    


 


Ammonia  12 umol/L (9-33)   07/28/17  15:15    


 


Troponin I  < 0.01 ng/mL  07/27/17  06:30    


 


Total Protein  7.0 g/dL (5.8-8.3)   07/29/17  07:35    


 


Albumin  3.6 g/dL (3.0-4.8)   07/29/17  07:35    


 


Globulin  3.3 gm/dL  07/29/17  07:35    


 


Albumin/Globulin Ratio  1.1  (1.1-1.8)   07/29/17  07:35    


 


Urine Color  Yellow  (YELLOW)   07/27/17  16:55    


 


Urine Appearance  Clear  (CLEAR)   07/27/17  16:55    


 


Urine pH  7.5  (4.7-8.0)   07/27/17  16:55    


 


Ur Specific Gravity  1.015  (1.005-1.035)   07/27/17  16:55    


 


Urine Protein  Trace mg/dL (<30 mg/dL)  H  07/27/17  16:55    


 


Urine Glucose (UA)  Negative mg/dL (NEGATIVE)   07/27/17  16:55    


 


Urine Ketones  Negative mg/dL (NEGATIVE)   07/27/17  16:55    


 


Urine Blood  Negative  (NEGATIVE)   07/27/17  16:55    


 


Urine Nitrate  Negative  (NEGATIVE)   07/27/17  16:55    


 


Urine Bilirubin  Negative  (NEGATIVE)   07/27/17  16:55    


 


Urine Urobilinogen  0.2 E.U./dL (<1 E.U./dL)   07/27/17  16:55    


 


Ur Leukocyte Esterase  Trace Salvatore/uL (NEGATIVE)  H  07/27/17  16:55    


 


Urine RBC  TEST NOT PERFORMED   07/27/17  16:55    


 


Urine WBC  2 - 5 /hpf (0-6)   07/27/17  16:55    


 


Ur Epithelial Cells  6 - 8 /hpf (0-5)   07/27/17  16:55    


 


Alcohol, Quantitative  < 10 mg/dL (0-10)   07/26/17  17:10    














- Hospital Course


Hospital Course: 





Nohelia is a 88 year old female with past medical history significant for atrial 

fibrillation on coumadin, Hypertension, Hypercholesterolemia, advanced dementia

, COPD, and diabetes brought in by EMS after patient was witnessed to have a 

possible syncopal vs. seizure episode while at a [facility]. Pt is noted to be 

confused and minimally responsive to questioning. Through chart review patient 

is noted to be historically a poor historian. When questioned the patient is 

only able to recall going into the [facility] and then ending up in the emergency 

room. She is alert and oriented to person and time. Patient is joined by friend 

who was present during her fall. She reports the patient having a normal 

conversation one moment and then immediately after collapsing into her friend. 

Patient's friend denies seizure like activity. 


Assessment: 





This is an 88 year old female with PMHx HTN, A-fib, DM, HLD, anxiety, breast 

cancer s/p mastectomy, chronic hearing difficulties, cognitive impairment who 

presented to the hospital after a fall. It is possible that this is a seizure 

episode, although previous EEG in 5/12/17 revealed diffuse slowing and 

bilateral cerebral dysfunction with no epileptiform activity. Head CT showed no 

acute findings. Likely the seizure could be secondary to transient cerebral 

hypoperfusion and neurodegenerative. EEG done 7/27/17 was unremarkable for 

epileptiform activity. MRI Brain showed chronic ischemic changes. Patient has 

UTI and found with gram negative rods which is likely contributing to her 

confusional mental status and provoking breakthrough seizures.  





Plan: 





1) Keppra 500 mg BID


2) Avoid sedative medications


3) delirium, fall, seizure precautions


4) PT evaluation and treatment


5) Avoid hypoglycemic or hyperglycemic states. Needs better management of 

diabetes.


6) ASA 81 mg for stroke prevention


7) Medical management for UTI





*Try to avoid overuse of antipsychotics as they can lower the seizure thresure 








Discharge Exam





- Head Exam


Head Exam: ATRAUMATIC, NORMAL INSPECTION, NORMOCEPHALIC





- Eye Exam


Eye Exam: EOMI, Normal appearance, PERRL


Pupil Exam: NORMAL ACCOMODATION, PERRL





- GI/Abdominal Exam


GI & Abdominal Exam: Normal Bowel Sounds





- Rectal Exam


Rectal Exam: NORMAL INSPECTION





-  Exam


 Exam: Circumcision, NORMAL INSPECTION


External exam: NORMAL EXTERNAL EXAM


Speculum exam: NORMAL SPECULUM EXAM


Bimanual exam: NORMAL BIMANUAL EXAM





- Neurological Exam


Neurological exam: Alert, CN II-XII Intact, Normal Gait, Oriented x3, Reflexes 

Normal





- Psychiatric Exam


Psychiatric exam: Normal Affect, Normal Mood





- Skin


Skin Exam: Dry, Intact, Normal Color, Warm





Discharge Plan





- Follow Up Plan


Condition: STABLE


Disposition: TRANSF TO SNF


Instructions:  Syncope (DC), Syncope (GEN), Nonepileptic Seizures (DC), Suicide 

Prevention for Geriatrics (DC), Weakness (GEN), Near Syncope (ED), Altered 

Mental Status (GEN)


Referrals: 


Jennifer Muller MD [Primary Care Provider] -

## 2017-08-01 NOTE — CP.PCM.HP
History of Present Illness





- History of Present Illness


History of Present Illness: 








89 yo  female with extensive medical history that includes hypertension

, atrial fibrillation, diabetes, hyperlipidemia, anxiety, breast cancer history

, chronic hearing difficulties, and cognitive impairment sent to hospital after 

a fall at home.  She was evaluated for potential seizures.  A urinary tract 

infection with Morganella was found and is currently being treated with 

Rocephin.  The patient was transferred to the [facility] for deconditioned state.  My 

understanding is that the patient lives alone.  It appears that the patient may 

not be able to live on her own.








Present on Admission





- Present on Admission


Any Indicators Present on Admission: No





Review of Systems





- Constitutional


Constitutional: As Per HPI





- EENT


Eyes: As Per HPI


Ears: Decreased Hearing


Nose/Mouth/Throat: As Per HPI





- Breasts


Additional comments: 





b/l mastectmy





- Cardiovascular


Cardiovascular: As Per HPI





- Respiratory


Respiratory: As Per HPI





- Gastrointestinal


Gastrointestinal: As Per HPI





Past Patient History





- Infectious Disease


Hx of Infectious Diseases: None





- Tetanus Immunizations


Tetanus Immunization: Unknown





- Past Social History


Smoking Status: Never Smoked





- CARDIAC


Hx Cardiac Disorders: Yes (Afib (on Coumadin))


Hx Hypercholesterolemia: Yes


Hx Hypertension: Yes





- PULMONARY


Hx Chronic Obstructive Pulmonary Disease (COPD): Yes





- NEUROLOGICAL


HX Cerebrovascular Accident: Yes





- HEENT


Hx HEENT Problems: Yes (Hard of hearing)





- RENAL


Hx Chronic Kidney Disease: No





- ENDOCRINE/METABOLIC


Hx Diabetes Mellitus Type 2: Yes





- HEMATOLOGICAL/ONCOLOGICAL


Hx Cancer: Yes (Breast CA)


Hx Chemotherapy: Yes





- INTEGUMENTARY


Hx Dermatological Problems: No





- MUSCULOSKELETAL/RHEUMATOLOGICAL


Hx Falls: Yes


Hx Fractures: Yes (Right ankle)


Hx Osteoarthritis: Yes





- GASTROINTESTINAL


Hx Gastrointestinal Disorders: Yes (POOR APPETTITE,)





- GENITOURINARY/GYNECOLOGICAL


Hx Urinary Tract Infection: Yes





- PSYCHIATRIC


Hx Substance Use: No





- SURGICAL HISTORY


Hx Mastectomy: Yes





- ANESTHESIA


Hx Anesthesia: Yes


Hx Anesthesia Reactions: No


Hx Malignant Hyperthermia: No





Meds


Allergies/Adverse Reactions: 


 Allergies











Allergy/AdvReac Type Severity Reaction Status Date / Time


 


FISH Allergy  NAUSEA Verified 07/26/17 17:07


 


Sulfa (Sulfonamide Allergy  HEADACHE Verified 07/26/17 17:07





Antibiotics)     














Physical Exam





- Constitutional


Appears: Well





- Head Exam


Head Exam: ATRAUMATIC, NORMAL INSPECTION, NORMOCEPHALIC





- Eye Exam


Eye Exam: EOMI, Normal appearance, PERRL


Pupil Exam: NORMAL ACCOMODATION, PERRL





- ENT Exam


ENT Exam: Mucous Membranes Moist, Normal Exam





- Neck Exam


Neck exam: Positive for: Normal Inspection





- Respiratory Exam


Respiratory Exam: Clear to Auscultation Bilateral, NORMAL BREATHING PATTERN





- Cardiovascular Exam


Cardiovascular Exam: REGULAR RHYTHM





- GI/Abdominal Exam


GI & Abdominal Exam: Normal Bowel Sounds, Soft.  absent: Tenderness





- Rectal Exam


Rectal Exam: NORMAL INSPECTION





-  Exam


 Exam: Circumcision, NORMAL INSPECTION


External exam: NORMAL EXTERNAL EXAM


Speculum exam: NORMAL SPECULUM EXAM


Bimanual exam: NORMAL BIMANUAL EXAM





- Extremities Exam


Extremities exam: Positive for: normal inspection





- Back Exam


Back exam: NORMAL INSPECTION





- Neurological Exam


Neurological exam: Alert, CN II-XII Intact, Normal Gait, Oriented x3, Reflexes 

Normal





- Psychiatric Exam


Psychiatric exam: Agitated, Anxious





- Skin


Skin Exam: Dry, Intact, Normal Color, Warm





Results





- Vital Signs


Recent Vital Signs: 





 Last Vital Signs











Temp  98.2 F   08/01/17 17:14


 


Pulse  63   08/01/17 17:14


 


Resp  14   08/01/17 17:14


 


BP  156/77 H  08/01/17 17:14


 


Pulse Ox  94 L  08/01/17 17:14














- Labs


Result Diagrams: 


 08/01/17 08:30





 08/01/17 08:30


Labs: 





 Laboratory Results - last 24 hr











  08/01/17 08/01/17 08/01/17





  06:55 08:30 08:30


 


WBC   7.8  D 


 


RBC   4.51 


 


Hgb   13.5 


 


Hct   39.6 


 


MCV   87.8 


 


MCH   29.9 


 


MCHC   34.1 


 


RDW   12.3 


 


Plt Count   193 


 


MPV   9.9 


 


Gran %   63.5 


 


Lymph % (Auto)   19.4 L 


 


Mono % (Auto)   11.5 H 


 


Eos % (Auto)   4.4 


 


Baso % (Auto)   1.2 


 


Gran #   4.96 


 


Lymph #   1.5 


 


Mono #   0.9 H 


 


Eos #   0.3 


 


Baso #   0.09 


 


PT    12.5 H


 


INR    1.16 H


 


Sodium   


 


Potassium   


 


Chloride   


 


Carbon Dioxide   


 


Anion Gap   


 


BUN   


 


Creatinine   


 


Est GFR ( Amer)   


 


Est GFR (Non-Af Amer)   


 


POC Glucose (mg/dL)  165 H  


 


Random Glucose   


 


Calcium   


 


Total Bilirubin   


 


AST   


 


ALT   


 


Alkaline Phosphatase   


 


Total Protein   


 


Albumin   


 


Globulin   


 


Albumin/Globulin Ratio   














  08/01/17 08/01/17 08/01/17





  08:30 11:40 21:55


 


WBC   


 


RBC   


 


Hgb   


 


Hct   


 


MCV   


 


MCH   


 


MCHC   


 


RDW   


 


Plt Count   


 


MPV   


 


Gran %   


 


Lymph % (Auto)   


 


Mono % (Auto)   


 


Eos % (Auto)   


 


Baso % (Auto)   


 


Gran #   


 


Lymph #   


 


Mono #   


 


Eos #   


 


Baso #   


 


PT   


 


INR   


 


Sodium  140  


 


Potassium  4.4  


 


Chloride  99  


 


Carbon Dioxide  28  


 


Anion Gap  17  


 


BUN  27 H  


 


Creatinine  0.7  


 


Est GFR ( Amer)  > 60  


 


Est GFR (Non-Af Amer)  > 60  


 


POC Glucose (mg/dL)   312 H  217 H


 


Random Glucose  187 H  


 


Calcium  9.3  


 


Total Bilirubin  0.4  


 


AST  28  


 


ALT  24  


 


Alkaline Phosphatase  76  


 


Total Protein  7.5  


 


Albumin  4.0  


 


Globulin  3.5  


 


Albumin/Globulin Ratio  1.2  














Assessment & Plan





- Assessment and Plan (Free Text)


Assessment: 





Assessment: 





89 yo  female with a fall at home and possible seizure and current 

finding of UTI with Morganella.  Started on Rocephin for antibiotic treatment.  

Supportive care.





Patient on Keppra for seizure prophylaxis.h/o seizures x2 





Patient is currently agitated and anxious, is on 1:1 





Continue with Rocephin for uti

## 2017-08-02 PROCEDURE — F07Z5ZZ BED MOBILITY TREATMENT: ICD-10-PCS | Performed by: INTERNAL MEDICINE

## 2017-08-02 PROCEDURE — F07Z9FZ GAIT TRAINING/FUNCTIONAL AMBULATION TREATMENT USING ASSISTIVE, ADAPTIVE, SUPPORTIVE OR PROTECTIVE EQUIPMENT: ICD-10-PCS | Performed by: INTERNAL MEDICINE

## 2017-08-02 PROCEDURE — F07Z8ZZ TRANSFER TRAINING TREATMENT: ICD-10-PCS | Performed by: INTERNAL MEDICINE

## 2017-08-02 RX ADMIN — INSULIN HUMAN SCH UNITS: 100 INJECTION, SOLUTION PARENTERAL at 17:10

## 2017-08-02 RX ADMIN — CEFTRIAXONE SCH MLS/HR: 1 INJECTION, SOLUTION INTRAVENOUS at 05:08

## 2017-08-02 RX ADMIN — INSULIN HUMAN SCH UNITS: 100 INJECTION, SOLUTION PARENTERAL at 08:00

## 2017-08-02 RX ADMIN — PANTOPRAZOLE SODIUM SCH MG: 40 TABLET, DELAYED RELEASE ORAL at 05:08

## 2017-08-02 RX ADMIN — INSULIN HUMAN SCH UNITS: 100 INJECTION, SOLUTION PARENTERAL at 12:58

## 2017-08-02 RX ADMIN — INSULIN HUMAN SCH: 100 INJECTION, SOLUTION PARENTERAL at 22:14

## 2017-08-02 RX ADMIN — POLYETHYLENE GLYCOL 3350 SCH: 17 POWDER, FOR SOLUTION ORAL at 09:53

## 2017-08-02 RX ADMIN — Medication SCH MG: at 09:52

## 2017-08-02 NOTE — PN
SUBJECTIVE:  The patient was seen and examined on the bedside, looking

comfortable.  No nausea, vomiting, diarrhea.  No hematuria.  No

hematochezia.  No swelling of the legs.  No chest pain, no palpitation, but

the patient is getting attacks of nervousness, anxiety, shouting, yelling,

getting very angry, sometime looks like she is alone, she is not safe.  We

put her on 1:1 for her safty .



PHYSICAL EXAMINATION

VITAL SIGNS:  Temperature 98.8, pulse 62, blood pressure 137/73,

respiratory rate 14.

HEENT:  Head is normocephalic, atraumatic.  Eyes; PERRLA, extraocular

movements intact.  Conjunctivae are clear.  Nose is potent.  Mucous

membrane moist.

NECK:  Supple.  No carotid bruits.  No JVD or thyromegaly.

CHEST:  Bilaterally symmetrical.

HEART:  S1 and S2 positive.

LUNGS:  Clear to auscultation.

ABDOMEN:  Soft.  Bowel sounds present.  No organomegaly.

EXTREMITIES:  No edema.  No cyanosis.

NEUROLOGIC:  The patient is awake and alert.  Moving all four extremities. 

No focal deficits.



MEDICATIONS:  Aricept, sotalol, Coumadin, Cozaar, insulin, Keppra,

magnesium oxide, MiraLax, Protonix, Risperdal.



LABORATORY DATA:  White blood cell 7.8, hemoglobin 13.5, hematocrit 39.6,

platelet 193.  Sodium 140, potassium 4.4, BUN 27, creatinine 0.29, glucose

217, 312, coagulation INR is 1.16



ASSESSMENT AND PLAN:  The patient is an 88-year-old lady with

hyperglycemia, hypertension, atrial fibrillation, hypercholesterolemia,

anxiety, history of breast cancer, chronic hearing difficulties, cognitive

impairment, history of fall, new onset of diseases, urinary tract infection

with Morganella and getting treatment with Rocephin started by me.  The

patient was seen on medical floor then transferred to TCU.  This patient is

living alone and looks like the patient does not live alone.  Her power of

 is her niece, Rosi.  Today, we had length of time discussion

done with Rosi and she will talk to her father and her father is

brother of the patient.  We talked to the patient's best friend, Kathi and

I personally talked to the patient's friend, Kira.  The patient wanted to

go home, but she is not able to live alone.  Family and friends were

talking together to get some solution, meanwhile we are giving antibiotics.







__________________________________________

Jennifer Muller MD





DD:  08/02/2017 22:15:08

DT:  08/02/2017 23:54:18

Job # [id number]



[email or web address]

## 2017-08-02 NOTE — CP.PCM.PN
Subjective





- Date & Time of Evaluation


Date of Evaluation: 08/02/17


Time of Evaluation: 15:15





- Subjective


Subjective: 


Infectious Disease Follow Up:


August 2, 2017





89 yo  female with extensive medical history that includes hypertension

, atrial fibrillation, diabetes, hyperlipidemia, anxiety, breast cancer history

, chronic hearing difficulties, and cognitive impairment sent to hospital after 

a fall at home.  She was evaluated for potential seizures.  A urinary tract 

infection with Morganella was found and is currently being treated with 

Rocephin.  The patient was transferred to the [facility] for deconditioned state.  My 

understanding is that the patient lives alone.  It appears that the patient may 

not be able to live on her own.





She remains distressed when seen today.  Reviewing nursing notes, the patient 

has periods where she is calm.  To me she appears to overall confused.








Objective





- Vital Signs/Intake and Output


Vital Signs (last 24 hours): 


 











Temp Pulse Resp BP Pulse Ox


 


 98.2 F   73   18   115/70   100 


 


 08/02/17 10:42  08/02/17 10:42  08/02/17 10:42  08/02/17 10:42  08/02/17 10:42











- Medications


Medications: 


 Current Medications





Acetaminophen (Tylenol 325mg Tab)  650 mg PO Q6H PRN; Protocol


   PRN Reason: Pain, moderate (4-7)


   Last Admin: 08/01/17 17:15 Dose:  650 mg


Donepezil HCl (Aricept)  5 mg PO HS JOSHUA


   PRN Reason: Protocol


   Last Admin: 08/01/17 22:21 Dose:  5 mg


Ceftriaxone Sodium (Rocephin 1 Gram Ivpb)  1 gm in 100 mls @ 100 mls/hr IVPB 

0600 JOSHUA


   PRN Reason: Protocol


   Last Admin: 08/02/17 05:08 Dose:  100 mls/hr


Insulin Human Regular (Humulin R Low)  0 units SC ACHS JOSHUA


   PRN Reason: Protocol


   Last Admin: 08/02/17 12:58 Dose:  4 units


Levetiracetam (Keppra)  500 mg PO BID JOSHUA


   PRN Reason: Protocol


   Last Admin: 08/02/17 09:51 Dose:  500 mg


Losartan Potassium (Cozaar)  100 mg PO DAILY JOSHUA


   PRN Reason: Protocol


   Last Admin: 08/02/17 09:51 Dose:  100 mg


Magnesium Oxide (Mag-Ox)  400 mg PO DAILY JOSHUA


   PRN Reason: Protocol


   Last Admin: 08/02/17 09:52 Dose:  400 mg


Pantoprazole Sodium (Protonix Ec Tab)  40 mg PO 0600 JOSHUA


   PRN Reason: Protocol


   Last Admin: 08/02/17 05:08 Dose:  40 mg


Polyethylene Glycol (Miralax)  17 gm PO DAILY JOSHUA


   PRN Reason: Protocol


   Last Admin: 08/02/17 09:53 Dose:  Not Given


Risperidone (Risperdal Tab)  0.5 mg PO AMHS PRN; Protocol


   PRN Reason: Agitation


   Last Admin: 07/31/17 19:55 Dose:  0.5 mg


Sotalol HCl (Betapace)  80 mg PO BID JOSHUA


   PRN Reason: Protocol


   Last Admin: 08/02/17 09:51 Dose:  80 mg


Warfarin Sodium (Coumadin)  3 mg PO 1800 JOSHUA


   PRN Reason: Protocol


   Last Admin: 08/01/17 17:08 Dose:  3 mg











- Labs


Labs: 


 





 08/01/17 08:30 





 08/01/17 08:30 





 











PT  12.5 Seconds (9.9-11.8)  H  08/01/17  08:30    


 


INR  1.16  (0.93-1.08)  H  08/01/17  08:30    














- Constitutional


Appears: Non-toxic, No Acute Distress, Chronically Ill





- Head Exam


Head Exam: ATRAUMATIC, NORMOCEPHALIC





- Eye Exam


Eye Exam: EOMI, PERRL


Pupil Exam: NORMAL ACCOMODATION, PERRL





- ENT Exam


ENT Exam: Mucous Membranes Moist, Normal External Ear Exam, TM's Normal 

Bilaterally





- Neck Exam


Neck Exam: Full ROM, Normal Inspection





- Respiratory Exam


Respiratory Exam: Clear to Ausculation Bilateral, NORMAL BREATHING PATTERN.  

absent: Rales, Rhonchi, Wheezes





- Cardiovascular Exam


Cardiovascular Exam: REGULAR RHYTHM, RRR, +S1, +S2





- GI/Abdominal Exam


GI & Abdominal Exam: Soft, Normal Bowel Sounds.  absent: Distended, Tenderness





- Extremities Exam


Additional comments: 


No c/c/e.  adequate distal pulses.








- Neurological Exam


Neurological Exam: Alert, Awake, CN II-XII Intact


Additional comments: 


AAO x 1-2.  slow recognition and thought process.  Very forgetful.  Periods of 

agitation.








- Psychiatric Exam


Psychiatric exam: Agitated, Anxious





- Skin


Skin Exam: Intact, Normal Color





Assessment and Plan





- Assessment and Plan (Free Text)


Assessment: 


89 yo  female with a fall at home and possible seizure and current 

finding of UTI with Morganella.  Started on Rocephin for antibiotic treatment.  

Supportive care.





Patient on Keppra for seizure prophylaxis.





Patient with periods of being agitated and anxious and periods of being calm.





Continue with Rocephin for treatment of UTI for total of 7 days.





Thank you for allowing me to participate in the care of the patient, we will 

follow with you.

## 2017-08-03 RX ADMIN — Medication SCH MG: at 09:33

## 2017-08-03 RX ADMIN — INSULIN HUMAN SCH: 100 INJECTION, SOLUTION PARENTERAL at 22:06

## 2017-08-03 RX ADMIN — CEFTRIAXONE SCH MLS/HR: 1 INJECTION, SOLUTION INTRAVENOUS at 06:44

## 2017-08-03 RX ADMIN — POLYETHYLENE GLYCOL 3350 SCH GM: 17 POWDER, FOR SOLUTION ORAL at 09:32

## 2017-08-03 RX ADMIN — PANTOPRAZOLE SODIUM SCH MG: 40 TABLET, DELAYED RELEASE ORAL at 06:44

## 2017-08-03 RX ADMIN — INSULIN HUMAN SCH UNITS: 100 INJECTION, SOLUTION PARENTERAL at 11:47

## 2017-08-03 RX ADMIN — INSULIN HUMAN SCH UNITS: 100 INJECTION, SOLUTION PARENTERAL at 16:39

## 2017-08-03 RX ADMIN — INSULIN HUMAN SCH UNITS: 100 INJECTION, SOLUTION PARENTERAL at 06:53

## 2017-08-03 NOTE — CP.PCM.PN
Subjective





- Date & Time of Evaluation


Date of Evaluation: 08/03/17


Time of Evaluation: 16:00





- Subjective


Subjective: 





Infectious Disease Follow Up:


August 3, 2017





87 yo  female with extensive medical history that includes hypertension

, atrial fibrillation, diabetes, hyperlipidemia, anxiety, breast cancer history

, chronic hearing difficulties, and cognitive impairment sent to hospital after 

a fall at home.  She was evaluated for potential seizures.  A urinary tract 

infection with Morganella was found and is currently being treated with 

Rocephin.  The patient was transferred to the [facility] for deconditioned state.  My 

understanding is that the patient lives alone.  It appears that the patient may 

not be able to live on her own.





She remains distressed when seen today.  Reviewing nursing notes, the patient 

has periods where she is calm.  To me she appears to overall confused.  As 

patient remains hospitalized, the patient is yelling and screaming more each 

day.  She has become more emotionally volatile.





Objective





- Vital Signs/Intake and Output


Vital Signs (last 24 hours): 


 











Temp Pulse Resp BP Pulse Ox


 


 97.9 F   59 L  15   141/71   93 L


 


 08/03/17 16:00  08/03/17 16:00  08/03/17 16:00  08/03/17 16:00  08/02/17 16:35








Intake and Output: 


 











 08/03/17 08/03/17





 06:59 18:59


 


Intake Total  800


 


Balance  800














- Medications


Medications: 


 Current Medications





Acetaminophen (Tylenol 325mg Tab)  650 mg PO Q6H PRN; Protocol


   PRN Reason: Pain, moderate (4-7)


   Last Admin: 08/02/17 17:09 Dose:  650 mg


Donepezil HCl (Aricept)  5 mg PO HS JOSHUA


   PRN Reason: Protocol


   Last Admin: 08/02/17 21:29 Dose:  5 mg


Ceftriaxone Sodium (Rocephin 1 Gram Ivpb)  1 gm in 100 mls @ 100 mls/hr IVPB 

0600 JOSHUA


   PRN Reason: Protocol


   Last Admin: 08/03/17 06:44 Dose:  100 mls/hr


Insulin Human Regular (Humulin R Low)  0 units SC ACHS JOSHUA


   PRN Reason: Protocol


   Last Admin: 08/03/17 06:53 Dose:  2 units


Levetiracetam (Keppra)  500 mg PO BID JOSHUA


   PRN Reason: Protocol


   Last Admin: 08/03/17 09:32 Dose:  500 mg


Losartan Potassium (Cozaar)  100 mg PO DAILY JOSHUA


   PRN Reason: Protocol


   Last Admin: 08/03/17 09:33 Dose:  100 mg


Magnesium Oxide (Mag-Ox)  400 mg PO DAILY JOSHUA


   PRN Reason: Protocol


   Last Admin: 08/03/17 09:33 Dose:  400 mg


Pantoprazole Sodium (Protonix Ec Tab)  40 mg PO 0600 JOSHUA


   PRN Reason: Protocol


   Last Admin: 08/03/17 06:44 Dose:  40 mg


Polyethylene Glycol (Miralax)  17 gm PO DAILY JOSHUA


   PRN Reason: Protocol


   Last Admin: 08/03/17 09:32 Dose:  17 gm


Risperidone (Risperdal Tab)  0.5 mg PO AMHS PRN; Protocol


   PRN Reason: Agitation


   Last Admin: 07/31/17 19:55 Dose:  0.5 mg


Sotalol HCl (Betapace)  80 mg PO BID JOSHUA


   PRN Reason: Protocol


   Last Admin: 08/03/17 09:34 Dose:  80 mg


Warfarin Sodium (Coumadin)  3 mg PO 1800 JOSHUA


   PRN Reason: Protocol


   Last Admin: 08/02/17 17:07 Dose:  3 mg











- Labs


Labs: 


 





 08/01/17 08:30 





 08/01/17 08:30 





 











PT  12.5 Seconds (9.9-11.8)  H  08/01/17  08:30    


 


INR  1.16  (0.93-1.08)  H  08/01/17  08:30    














- Constitutional


Appears: Non-toxic, No Acute Distress, Chronically Ill





- Head Exam


Head Exam: ATRAUMATIC, NORMOCEPHALIC





- Eye Exam


Eye Exam: EOMI, PERRL


Pupil Exam: NORMAL ACCOMODATION, PERRL





- ENT Exam


ENT Exam: Mucous Membranes Moist, Normal External Ear Exam, TM's Normal 

Bilaterally





- Neck Exam


Neck Exam: Full ROM, Normal Inspection





- Respiratory Exam


Respiratory Exam: Clear to Ausculation Bilateral, NORMAL BREATHING PATTERN.  

absent: Rales, Rhonchi, Wheezes





- Cardiovascular Exam


Cardiovascular Exam: REGULAR RHYTHM, RRR, +S1, +S2





- GI/Abdominal Exam


GI & Abdominal Exam: Soft, Normal Bowel Sounds.  absent: Distended, Tenderness





- Extremities Exam


Additional comments: 


No c/c/e.  adequate distal pulses.





- Neurological Exam


Neurological Exam: Alert, Awake, CN II-XII Intact


Additional comments: 


AAO x 1-2.  slow recognition and thought process.  Very forgetful.  Periods of 

agitation.





- Psychiatric Exam


Psychiatric exam: Agitated, Anxious





- Skin


Skin Exam: Intact, Normal Color





Assessment and Plan





- Assessment and Plan (Free Text)


Assessment: 


87 yo  female with a fall at home and possible seizure and current 

finding of UTI with Morganella.  Started on Rocephin for antibiotic treatment.  

Supportive care.





Patient on Keppra for seizure prophylaxis.





Patient with periods of being agitated and anxious and periods of being calm.  

She has been more emotionally labile.





Continue with Rocephin for treatment of UTI for total of 7 days.





Thank you for allowing me to participate in the care of the patient, we will 

follow with you.

## 2017-08-04 RX ADMIN — CEFTRIAXONE SCH MLS/HR: 1 INJECTION, SOLUTION INTRAVENOUS at 07:23

## 2017-08-04 RX ADMIN — INSULIN HUMAN SCH UNITS: 100 INJECTION, SOLUTION PARENTERAL at 07:25

## 2017-08-04 RX ADMIN — PANTOPRAZOLE SODIUM SCH MG: 40 TABLET, DELAYED RELEASE ORAL at 07:23

## 2017-08-04 RX ADMIN — Medication SCH MG: at 10:29

## 2017-08-04 RX ADMIN — INSULIN HUMAN SCH UNITS: 100 INJECTION, SOLUTION PARENTERAL at 12:57

## 2017-08-04 RX ADMIN — INSULIN HUMAN SCH UNITS: 100 INJECTION, SOLUTION PARENTERAL at 17:59

## 2017-08-04 RX ADMIN — POLYETHYLENE GLYCOL 3350 SCH GM: 17 POWDER, FOR SOLUTION ORAL at 10:29

## 2017-08-04 RX ADMIN — INSULIN HUMAN SCH: 100 INJECTION, SOLUTION PARENTERAL at 23:02

## 2017-08-04 NOTE — CP.PCM.PN
Subjective





- Date & Time of Evaluation


Date of Evaluation: 08/04/17


Time of Evaluation: 15:15





- Subjective


Subjective: 


Infectious Disease Follow Up:


August 4, 2017





89 yo  female with extensive medical history that includes hypertension

, atrial fibrillation, diabetes, hyperlipidemia, anxiety, breast cancer history

, chronic hearing difficulties, and cognitive impairment sent to hospital after 

a fall at home.  She was evaluated for potential seizures.  A urinary tract 

infection with Morganella was found and is currently being treated with 

Rocephin.  The patient was transferred to the [facility] for deconditioned state.  My 

understanding is that the patient lives alone.  It appears that the patient may 

not be able to live on her own.





She remains distressed when seen today.  Reviewing nursing notes, the patient 

has periods where she is calm.  To me she appears to overall confused.  As 

patient remains hospitalized, the patient is yelling and screaming more each 

day.  She has become more emotionally volatile.








Objective





- Vital Signs/Intake and Output


Vital Signs (last 24 hours): 


 











Temp Pulse Resp BP Pulse Ox


 


 98.0 F   70   18   132/70   98 


 


 08/04/17 10:48  08/04/17 10:48  08/04/17 10:48  08/04/17 10:48  08/04/17 10:48











- Medications


Medications: 


 Current Medications





Acetaminophen (Tylenol 325mg Tab)  650 mg PO Q6H PRN; Protocol


   PRN Reason: Pain, moderate (4-7)


   Last Admin: 08/03/17 21:59 Dose:  650 mg


Donepezil HCl (Aricept)  5 mg PO HS JOSHUA


   PRN Reason: Protocol


   Last Admin: 08/03/17 21:11 Dose:  5 mg


Ceftriaxone Sodium (Rocephin 1 Gram Ivpb)  1 gm in 100 mls @ 100 mls/hr IVPB 

0600 JOSHUA


   PRN Reason: Protocol


   Last Admin: 08/04/17 07:23 Dose:  100 mls/hr


Insulin Human Regular (Humulin R Low)  0 units SC ACHS JOSHUA


   PRN Reason: Protocol


   Last Admin: 08/04/17 12:57 Dose:  4 units


Levetiracetam (Keppra)  500 mg PO BID JOSHUA


   PRN Reason: Protocol


   Last Admin: 08/04/17 11:00 Dose:  500 mg


Losartan Potassium (Cozaar)  100 mg PO DAILY JOSHUA


   PRN Reason: Protocol


   Last Admin: 08/04/17 10:29 Dose:  100 mg


Magnesium Oxide (Mag-Ox)  400 mg PO DAILY JOSHUA


   PRN Reason: Protocol


   Last Admin: 08/04/17 10:29 Dose:  400 mg


Pantoprazole Sodium (Protonix Ec Tab)  40 mg PO 0600 JOSHUA


   PRN Reason: Protocol


   Last Admin: 08/04/17 07:23 Dose:  40 mg


Polyethylene Glycol (Miralax)  17 gm PO DAILY JOSHUA


   PRN Reason: Protocol


   Last Admin: 08/04/17 10:29 Dose:  17 gm


Risperidone (Risperdal Tab)  0.5 mg PO AMHS PRN; Protocol


   PRN Reason: Agitation


   Last Admin: 08/03/17 21:59 Dose:  0.5 mg


Sotalol HCl (Betapace)  80 mg PO BID JOSHUA


   PRN Reason: Protocol


   Last Admin: 08/04/17 10:28 Dose:  80 mg


Warfarin Sodium (Coumadin)  3 mg PO 1800 JOSHUA


   PRN Reason: Protocol


   Last Admin: 08/03/17 17:53 Dose:  3 mg











- Labs


Labs: 


 





 08/01/17 08:30 





 08/01/17 08:30 





 











PT  12.5 Seconds (9.9-11.8)  H  08/01/17  08:30    


 


INR  1.16  (0.93-1.08)  H  08/01/17  08:30    














- Constitutional


Appears: Non-toxic, No Acute Distress, Chronically Ill





- Head Exam


Head Exam: ATRAUMATIC, NORMOCEPHALIC





- Eye Exam


Eye Exam: EOMI, PERRL


Pupil Exam: NORMAL ACCOMODATION, PERRL





- ENT Exam


ENT Exam: Mucous Membranes Moist, Normal External Ear Exam, TM's Normal 

Bilaterally





- Neck Exam


Neck Exam: Full ROM, Normal Inspection





- Respiratory Exam


Respiratory Exam: Clear to Ausculation Bilateral, NORMAL BREATHING PATTERN.  

absent: Rales, Rhonchi, Wheezes





- Cardiovascular Exam


Cardiovascular Exam: REGULAR RHYTHM, RRR, +S1, +S2





- GI/Abdominal Exam


GI & Abdominal Exam: Soft, Normal Bowel Sounds.  absent: Distended, Tenderness





- Extremities Exam


Additional comments: 


No c/c/e.  adequate distal pulses.











- Neurological Exam


Neurological Exam: Alert, Awake, CN II-XII Intact


Additional comments: 


AAO x 1.  slow recognition and thought process.  Very forgetful.  Periods of 

agitation.








- Psychiatric Exam


Psychiatric exam: Agitated, Anxious





- Skin


Skin Exam: Intact, Normal Color





Assessment and Plan





- Assessment and Plan (Free Text)


Assessment: 


89 yo  female with a fall at home and possible seizure and current 

finding of UTI with Morganella.  Started on Rocephin for antibiotic treatment.  

Supportive care.





Patient on Keppra for seizure prophylaxis.





Patient with periods of being agitated and anxious and periods of being calm.  

She has been more emotionally labile.





Continue with Rocephin for treatment of UTI for total of 7 days.





Thank you for allowing me to participate in the care of the patient, we will 

follow with you.

## 2017-08-04 NOTE — CP.PCM.PN
Subjective





- Date & Time of Evaluation


Date of Evaluation: 08/03/17


Time of Evaluation: 03:00





- Subjective


Subjective: 








87 yo  female with extensive medical history that includes hypertension

, atrial fibrillation, diabetes, hyperlipidemia, anxiety, breast cancer history

, chronic hearing difficulties, and cognitive impairment sent to hospital after 

a fall at home.  She was evaluated for potential seizures.  A urinary tract 

infection with Morganella was found and is currently being treated with 

Rocephin.  The patient was transferred to the [facility] for deconditioned state.  

patient lives alone.  It appears that the patient may not be able to live on 

her own , cannot do her adl.





She remains distressed when seen today.  Reviewing nursing notes, the patient 

has periods where she is calm.  To me she appears to overall confused.  As 

patient remains hospitalized, the patient is yelling and screaming more each 

day.  She has become more emotionally volatile.








Objective





- Vital Signs/Intake and Output


Vital Signs (last 24 hours): 


 











Temp Pulse Resp BP Pulse Ox


 


 97.9 F   59 L  15   141/71   93 L


 


 08/03/17 16:00  08/03/17 17:50  08/03/17 16:00  08/03/17 17:50  08/02/17 16:35








Intake and Output: 


 











 08/03/17 08/04/17





 18:59 06:59


 


Intake Total 800 


 


Balance 800 














- Medications


Medications: 


 Current Medications





Acetaminophen (Tylenol 325mg Tab)  650 mg PO Q6H PRN; Protocol


   PRN Reason: Pain, moderate (4-7)


   Last Admin: 08/03/17 21:59 Dose:  650 mg


Donepezil HCl (Aricept)  5 mg PO HS JOSHUA


   PRN Reason: Protocol


   Last Admin: 08/03/17 21:11 Dose:  5 mg


Ceftriaxone Sodium (Rocephin 1 Gram Ivpb)  1 gm in 100 mls @ 100 mls/hr IVPB 

0600 JOSHUA


   PRN Reason: Protocol


   Last Admin: 08/03/17 06:44 Dose:  100 mls/hr


Insulin Human Regular (Humulin R Low)  0 units SC ACHS JOSHUA


   PRN Reason: Protocol


   Last Admin: 08/03/17 22:06 Dose:  Not Given


Levetiracetam (Keppra)  500 mg PO BID JOSHUA


   PRN Reason: Protocol


   Last Admin: 08/03/17 17:54 Dose:  500 mg


Losartan Potassium (Cozaar)  100 mg PO DAILY JOSHUA


   PRN Reason: Protocol


   Last Admin: 08/03/17 09:33 Dose:  100 mg


Magnesium Oxide (Mag-Ox)  400 mg PO DAILY JOSHUA


   PRN Reason: Protocol


   Last Admin: 08/03/17 09:33 Dose:  400 mg


Pantoprazole Sodium (Protonix Ec Tab)  40 mg PO 0600 JOSHUA


   PRN Reason: Protocol


   Last Admin: 08/03/17 06:44 Dose:  40 mg


Polyethylene Glycol (Miralax)  17 gm PO DAILY JOSHUA


   PRN Reason: Protocol


   Last Admin: 08/03/17 09:32 Dose:  17 gm


Risperidone (Risperdal Tab)  0.5 mg PO AMHS PRN; Protocol


   PRN Reason: Agitation


   Last Admin: 08/03/17 21:59 Dose:  0.5 mg


Sotalol HCl (Betapace)  80 mg PO BID JOSHUA


   PRN Reason: Protocol


   Last Admin: 08/03/17 17:50 Dose:  80 mg


Warfarin Sodium (Coumadin)  3 mg PO 1800 JOSHUA


   PRN Reason: Protocol


   Last Admin: 08/03/17 17:53 Dose:  3 mg











- Labs


Labs: 


 





 08/01/17 08:30 





 08/01/17 08:30 





 











PT  12.5 Seconds (9.9-11.8)  H  08/01/17  08:30    


 


INR  1.16  (0.93-1.08)  H  08/01/17  08:30    














- Constitutional


Appears: Well





- Head Exam


Head Exam: ATRAUMATIC, NORMAL INSPECTION, NORMOCEPHALIC





- Eye Exam


Eye Exam: EOMI, Normal appearance, PERRL


Pupil Exam: NORMAL ACCOMODATION, PERRL





- ENT Exam


ENT Exam: Mucous Membranes Moist, Normal Exam





- Neck Exam


Neck Exam: Full ROM, Normal Inspection.  absent: Lymphadenopathy





- Respiratory Exam


Respiratory Exam: Clear to Ausculation Bilateral, NORMAL BREATHING PATTERN





- Cardiovascular Exam


Cardiovascular Exam: REGULAR RHYTHM, +S1, +S2.  absent: Murmur





- GI/Abdominal Exam


GI & Abdominal Exam: Soft, Normal Bowel Sounds.  absent: Tenderness





- Rectal Exam


Rectal Exam: NORMAL INSPECTION





-  Exam


 Exam: Circumcision, NORMAL INSPECTION


External exam: NORMAL EXTERNAL EXAM


Speculum exam: NORMAL SPECULUM EXAM


Bimanual exam: NORMAL BIMANUAL EXAM





- Extremities Exam


Extremities Exam: Full ROM, Normal Capillary Refill, Normal Inspection.  absent

: Joint Swelling, Pedal Edema





- Back Exam


Back Exam: NORMAL INSPECTION





- Neurological Exam


Neurological Exam: Alert, Awake, CN II-XII Intact, Normal Gait, Oriented x3





- Psychiatric Exam


Psychiatric exam: Normal Affect, Normal Mood





- Skin


Skin Exam: Dry, Intact, Normal Color, Warm





Assessment and Plan





- Assessment and Plan (Free Text)


Assessment: 





Assessment: 


87 yo  female with a fall at home and possible seizure and current 

finding of UTI with Morganella.  Started on Rocephin for antibiotic treatment.  

Supportive care.





Patient on Keppra for seizure prophylaxis.





Patient with periods of being agitated and anxious and periods of being calm.  

She has been more emotionally labile.





Continue with Rocephin for treatment of UTI for total of 7 days.


d/d with pt friend Kira

## 2017-08-05 PROCEDURE — F08Z2FZ GROOMING/PERSONAL HYGIENE TREATMENT USING ASSISTIVE, ADAPTIVE, SUPPORTIVE OR PROTECTIVE EQUIPMENT: ICD-10-PCS | Performed by: INTERNAL MEDICINE

## 2017-08-05 RX ADMIN — CEFTRIAXONE SCH MLS/HR: 1 INJECTION, SOLUTION INTRAVENOUS at 05:07

## 2017-08-05 RX ADMIN — INSULIN HUMAN SCH: 100 INJECTION, SOLUTION PARENTERAL at 22:24

## 2017-08-05 RX ADMIN — INSULIN HUMAN SCH UNITS: 100 INJECTION, SOLUTION PARENTERAL at 17:20

## 2017-08-05 RX ADMIN — INSULIN HUMAN SCH UNITS: 100 INJECTION, SOLUTION PARENTERAL at 06:33

## 2017-08-05 RX ADMIN — POLYETHYLENE GLYCOL 3350 SCH GM: 17 POWDER, FOR SOLUTION ORAL at 10:46

## 2017-08-05 RX ADMIN — Medication SCH MG: at 10:45

## 2017-08-05 RX ADMIN — INSULIN HUMAN SCH UNITS: 100 INJECTION, SOLUTION PARENTERAL at 11:58

## 2017-08-05 RX ADMIN — PANTOPRAZOLE SODIUM SCH MG: 40 TABLET, DELAYED RELEASE ORAL at 05:07

## 2017-08-05 NOTE — PN
SUBJECTIVE:  The patient seen and examined at the bedside, having dinner,

feeling better.  No more confusion, no more one-to-one.  No nausea,

vomiting, diarrhea.  No hematuria, no hematochezia.  No swelling of the

legs.  No chest pain or palpitation.



PHYSICAL EXAMINATION.

VITAL SIGNS:  Temperature 98.5, pulse 60, blood pressure 132/63,

respiratory rate is 18.

HEENT:  Head is normocephalic and atraumatic.  Eyes; PERRLA.  Extraocular

muscles intact.  Conjunctivae clear.  Nose is patent.

NECK:  Supple.  No carotid bruits.  No JVD or thyromegaly.

CHEST:  Bilaterally symmetrical.

HEART:  S1 and S2 positive.

LUNGS:  Clear to auscultation.

ABDOMEN:  Soft.  Bowel sounds present.  No organomegaly.

EXTREMITIES:  No edema.  No cyanosis.

NEUROLOGIC:  The patient is awake and alert.  Moving all 4 extremities.  No

focal deficit.



MEDICATIONS:  Aricept, sotalol, Coumadin, Cozaar, insulin, Keppra,

magnesium oxide, MiraLax, pantoprazole, Respirol, antibiotics.



LABORATORY DATA:  We do not have recent labs today, but I reviewed old

labs.  Glucose is 319, 199, 200, 250.



ASSESSMENT AND PLAN:  The patient is an 88-year-old lady with history of

hypertension, atrial fibrillation, is on Coumadin, diabetes mellitus,

hypercholesterolemia, anxiety, history of breast cancer, history of

mastectomy, chronic hearing difficulties, cognitive impairment, status post

fall, seizures, urinary tract infection with Morganella, getting Rocephin. 

Getting physical therapy in TCU.  She lives alone, going her home is not

safe, but she want to go home.  Discussion done with the patient's power of

, Rosi, patient's niece, patient's friend Minoo. 

Getting supportive care.  Getting Keppra for seizures.  Has complete the

course of Rocephin of 7 days as per infectious disease.  We will follow up.







__________________________________________

Jennifer Muller MD





DD:  08/04/2017 23:34:21

DT:  08/05/2017 0:11:20

Job # [id number]

## 2017-08-05 NOTE — PN
DATE:  08/05/2017



SUBJECTIVE:  The patient was seen and examined at the bedside looking

comfortable.  No nausea, vomiting, diarrhea.  No hematuria, no

hematochezia.  No swelling of the legs.  No chest pain or palpitation.  No

headache and no dizziness.



PHYSICAL EXAMINATION:

VITAL SIGNS:  Temperature 97.2, pulse 62, blood pressure 116/50,

respiratory rate 18.

HEENT:  Head is normocephalic and atraumatic.  Eyes; PERRLA.  Extraocular

muscles intact.  Conjunctivae clear.  Nose is patent.  Mucous membrane

moist.

NECK:  Supple.  No carotid bruits.  No JVD or thyromegaly.

CHEST:  Bilaterally symmetrical.

HEART:  S1 and S2 positive.

LUNGS:  Clear to auscultation.

ABDOMEN:  Soft.  Bowel sounds present.  No organomegaly.

EXTREMITIES:  No edema.  No cyanosis.

NEUROLOGIC:  The patient is awake and alert.  Moving all 4 extremities.  No

focal deficit.



MEDICATIONS:  Aricept, sotalol, clonidine, Cozaar, insulin, Keppra,

magnesium oxide, MiraLAX, Protonix, aspirin, Rocephin, and Tylenol.



LABORATORY DATA:  We do not have recent labs today, but I reviewed old

labs.  Note has been repeated.



ASSESSMENT AND PLAN:  Ms. Nohelia Castañeda is 88 years old lady with multiple

medical problems, advanced dementia, seizures, history of fall, urinary

tract infection with Morganella.  Getting Rocephin, supportive care.  The

patient is getting Keppra for seizure, need Coumadin for atrial

fibrillation.  ID on that case.  The patient has  multiple medicle  problem, 
lives

alone, want to go home, but [facility] is not safe discharge, but the

patient is looking mentally competent.  She is deciding to go home.  The

patient's niece power of  knows about that.  The patient's friends

knows about that also.  We will continue further treatment.  GI and DVT

prophylaxis .  We will follow up with inr .







__________________________________________

Jennifer Muller MD



DD:  08/05/2017 18:26:43

DT:  08/05/2017 20:15:02

Job # [id number]



RISHI

## 2017-08-05 NOTE — CP.PCM.PN
Subjective





- Date & Time of Evaluation


Date of Evaluation: 08/05/17


Time of Evaluation: 16:15





- Subjective


Subjective: 


Infectious Disease Follow Up:


August 5, 2017





87 yo  female with extensive medical history that includes hypertension

, atrial fibrillation, diabetes, hyperlipidemia, anxiety, breast cancer history

, chronic hearing difficulties, and cognitive impairment sent to hospital after 

a fall at home.  She was evaluated for potential seizures.  A urinary tract 

infection with Morganella was found and is currently being treated with 

Rocephin.  The patient was transferred to the [facility] for deconditioned state.  My 

understanding is that the patient lives alone.  It appears that the patient may 

not be able to live on her own.





She remains distressed when seen today.  Reviewing nursing notes, the patient 

has periods where she is calm.  To me she appears to overall agitated and at 

times confused.  She is nearing completion of her antibiotics.








Objective





- Vital Signs/Intake and Output


Vital Signs (last 24 hours): 


 











Temp Pulse Resp BP Pulse Ox


 


 97.6 F   62   18   168/59 H  95 


 


 08/05/17 16:00  08/05/17 16:00  08/05/17 16:00  08/05/17 16:00  08/05/17 16:00











- Medications


Medications: 


 Current Medications





Acetaminophen (Tylenol 325mg Tab)  650 mg PO Q6H PRN; Protocol


   PRN Reason: Pain, moderate (4-7)


   Last Admin: 08/05/17 15:15 Dose:  650 mg


Donepezil HCl (Aricept)  5 mg PO HS JOSHUA


   PRN Reason: Protocol


   Last Admin: 08/04/17 21:49 Dose:  5 mg


Ceftriaxone Sodium (Rocephin 1 Gram Ivpb)  1 gm in 100 mls @ 100 mls/hr IVPB 

0600 JOSHUA


   PRN Reason: Protocol


   Last Admin: 08/05/17 05:07 Dose:  100 mls/hr


Insulin Human Regular (Humulin R Low)  0 units SC ACHS JOSHUA


   PRN Reason: Protocol


   Last Admin: 08/05/17 11:58 Dose:  4 units


Levetiracetam (Keppra)  500 mg PO BID JOSHUA


   PRN Reason: Protocol


   Last Admin: 08/05/17 10:45 Dose:  500 mg


Losartan Potassium (Cozaar)  100 mg PO DAILY JOSHUA


   PRN Reason: Protocol


   Last Admin: 08/05/17 10:45 Dose:  100 mg


Magnesium Oxide (Mag-Ox)  400 mg PO DAILY JOSHUA


   PRN Reason: Protocol


   Last Admin: 08/05/17 10:45 Dose:  400 mg


Pantoprazole Sodium (Protonix Ec Tab)  40 mg PO 0600 JOSHUA


   PRN Reason: Protocol


   Last Admin: 08/05/17 05:07 Dose:  40 mg


Polyethylene Glycol (Miralax)  17 gm PO DAILY JOSHUA


   PRN Reason: Protocol


   Last Admin: 08/05/17 10:46 Dose:  17 gm


Risperidone (Risperdal Tab)  0.5 mg PO AMHS PRN; Protocol


   PRN Reason: Agitation


   Last Admin: 08/03/17 21:59 Dose:  0.5 mg


Sotalol HCl (Betapace)  80 mg PO BID JOSHUA


   PRN Reason: Protocol


   Last Admin: 08/05/17 11:14 Dose:  80 mg


Warfarin Sodium (Coumadin)  3 mg PO 1800 JOSHUA


   PRN Reason: Protocol


   Last Admin: 08/04/17 17:33 Dose:  3 mg











- Labs


Labs: 


 





 08/01/17 08:30 





 08/01/17 08:30 





 











PT  12.5 Seconds (9.9-11.8)  H  08/01/17  08:30    


 


INR  1.16  (0.93-1.08)  H  08/01/17  08:30    














- Constitutional


Appears: Non-toxic, No Acute Distress, Chronically Ill





- Head Exam


Head Exam: ATRAUMATIC, NORMOCEPHALIC





- Eye Exam


Eye Exam: EOMI, PERRL


Pupil Exam: NORMAL ACCOMODATION, PERRL





- ENT Exam


ENT Exam: Mucous Membranes Moist, Normal External Ear Exam, TM's Normal 

Bilaterally





- Neck Exam


Neck Exam: Full ROM, Normal Inspection





- Respiratory Exam


Respiratory Exam: Clear to Ausculation Bilateral, NORMAL BREATHING PATTERN.  

absent: Rales, Rhonchi, Wheezes





- Cardiovascular Exam


Cardiovascular Exam: REGULAR RHYTHM, RRR, +S1, +S2





- GI/Abdominal Exam


GI & Abdominal Exam: Soft, Normal Bowel Sounds.  absent: Distended, Tenderness





- Extremities Exam


Additional comments: 


No c/c/e.  adequate distal pulses.








- Neurological Exam


Neurological Exam: Alert, Awake, CN II-XII Intact


Additional comments: 





AAO x 1.  slow recognition and thought process.  Very forgetful.  Periods of 

agitation.





- Psychiatric Exam


Psychiatric exam: Agitated, Anxious





- Skin


Skin Exam: Intact, Normal Color

## 2017-08-06 LAB
BUN SERPL-MCNC: 29 MG/DL (ref 7–21)
CALCIUM SERPL-MCNC: 9.1 MG/DL (ref 8.4–10.5)
ERYTHROCYTE [DISTWIDTH] IN BLOOD BY AUTOMATED COUNT: 12.4 % (ref 11.5–14.5)
GFR NON-AFRICAN AMERICAN: > 60
HGB BLD-MCNC: 12.4 GM/DL (ref 12–16)
INR PPP: 1.56 (ref 0.93–1.08)
MCH RBC QN AUTO: 29.7 PG (ref 25–35)
MCHC RBC AUTO-ENTMCNC: 34.2 G/DL (ref 31–37)
MCV RBC AUTO: 87.1 FL (ref 80–105)
PLATELET # BLD: 191 10^3/UL (ref 120–450)
PMV BLD AUTO: 10.2 FL (ref 7–11)
PROTHROMBIN TIME: 16.8 SECONDS (ref 9.9–11.8)
RBC # BLD AUTO: 4.17 10^6/UL (ref 3.5–6.1)
WBC # BLD AUTO: 8.2 10^3/UL (ref 4.5–11)

## 2017-08-06 RX ADMIN — INSULIN HUMAN SCH UNITS: 100 INJECTION, SOLUTION PARENTERAL at 06:33

## 2017-08-06 RX ADMIN — INSULIN HUMAN SCH UNITS: 100 INJECTION, SOLUTION PARENTERAL at 21:41

## 2017-08-06 RX ADMIN — POLYETHYLENE GLYCOL 3350 SCH: 17 POWDER, FOR SOLUTION ORAL at 10:35

## 2017-08-06 RX ADMIN — PANTOPRAZOLE SODIUM SCH MG: 40 TABLET, DELAYED RELEASE ORAL at 05:32

## 2017-08-06 RX ADMIN — INSULIN HUMAN SCH UNITS: 100 INJECTION, SOLUTION PARENTERAL at 11:51

## 2017-08-06 RX ADMIN — CEFTRIAXONE SCH MLS/HR: 1 INJECTION, SOLUTION INTRAVENOUS at 05:32

## 2017-08-06 RX ADMIN — Medication SCH MG: at 10:35

## 2017-08-06 RX ADMIN — INSULIN HUMAN SCH UNITS: 100 INJECTION, SOLUTION PARENTERAL at 17:09

## 2017-08-06 NOTE — CP.PCM.PN
Subjective





- Date & Time of Evaluation


Date of Evaluation: 08/06/17


Time of Evaluation: 16:15





- Subjective


Subjective: 


Infectious Disease Follow Up:


August 6, 2017





89 yo  female with extensive medical history that includes hypertension

, atrial fibrillation, diabetes, hyperlipidemia, anxiety, breast cancer history

, chronic hearing difficulties, and cognitive impairment sent to hospital after 

a fall at home.  She was evaluated for potential seizures.  A urinary tract 

infection with Morganella was found and is currently being treated with 

Rocephin.  The patient was transferred to the [facility] for deconditioned state.  My 

understanding is that the patient lives alone.  It appears that the patient may 

not be able to live on her own.





She remains distressed when seen today.  Reviewing nursing notes, the patient 

has periods where she is calm.  To me she appears to overall agitated and at 

times confused.  She is nearing completion of her antibiotics.  She appears a 

little calmer today than previous days.  When she is calm, she is able to make 

what appears to be clear and competent decisions.








Objective





- Vital Signs/Intake and Output


Vital Signs (last 24 hours): 


 











Temp Pulse Resp BP Pulse Ox


 


 97.5 F L  54 L  18   138/60   95 


 


 08/06/17 16:08  08/06/17 16:08  08/06/17 16:08  08/06/17 16:08  08/06/17 16:08











- Medications


Medications: 


 Current Medications





Acetaminophen (Tylenol 325mg Tab)  650 mg PO Q6H PRN; Protocol


   PRN Reason: Pain, moderate (4-7)


   Last Admin: 08/06/17 04:26 Dose:  650 mg


Donepezil HCl (Aricept)  5 mg PO HS JOSHUA


   PRN Reason: Protocol


   Last Admin: 08/05/17 21:50 Dose:  5 mg


Ceftriaxone Sodium (Rocephin 1 Gram Ivpb)  1 gm in 100 mls @ 100 mls/hr IVPB 

0600 JOSHUA


   PRN Reason: Protocol


   Last Admin: 08/06/17 05:32 Dose:  100 mls/hr


Insulin Human Regular (Humulin R Low)  0 units SC ACHS JOSHUA


   PRN Reason: Protocol


   Last Admin: 08/06/17 11:51 Dose:  3 units


Levetiracetam (Keppra)  500 mg PO BID JOSHUA


   PRN Reason: Protocol


   Last Admin: 08/06/17 11:19 Dose:  500 mg


Losartan Potassium (Cozaar)  100 mg PO DAILY JOSHUA


   PRN Reason: Protocol


   Last Admin: 08/06/17 10:34 Dose:  100 mg


Magnesium Oxide (Mag-Ox)  400 mg PO DAILY JOSHUA


   PRN Reason: Protocol


   Last Admin: 08/06/17 10:35 Dose:  400 mg


Pantoprazole Sodium (Protonix Ec Tab)  40 mg PO 0600 JOSHUA


   PRN Reason: Protocol


   Last Admin: 08/06/17 05:32 Dose:  40 mg


Polyethylene Glycol (Miralax)  17 gm PO DAILY JOSHUA


   PRN Reason: Protocol


   Last Admin: 08/06/17 10:35 Dose:  Not Given


Risperidone (Risperdal Tab)  0.5 mg PO AMHS PRN; Protocol


   PRN Reason: Agitation


   Last Admin: 08/05/17 21:50 Dose:  0.5 mg


Sotalol HCl (Betapace)  80 mg PO BID JOSHUA


   PRN Reason: Protocol


   Last Admin: 08/06/17 10:34 Dose:  80 mg


Warfarin Sodium (Coumadin)  3 mg PO 1800 JOSHUA


   PRN Reason: Protocol


   Last Admin: 08/05/17 17:23 Dose:  3 mg











- Labs


Labs: 


 





 08/06/17 06:00 





 08/06/17 06:00 





 











PT  16.8 Seconds (9.9-11.8)  H  08/06/17  06:00    


 


INR  1.56  (0.93-1.08)  H  08/06/17  06:00    














- Constitutional


Appears: Non-toxic, No Acute Distress, Chronically Ill





- Head Exam


Head Exam: ATRAUMATIC, NORMOCEPHALIC





- Eye Exam


Eye Exam: EOMI, PERRL


Pupil Exam: NORMAL ACCOMODATION, PERRL





- ENT Exam


ENT Exam: Mucous Membranes Moist, Normal External Ear Exam, TM's Normal 

Bilaterally





- Neck Exam


Neck Exam: Full ROM, Normal Inspection





- Respiratory Exam


Respiratory Exam: Clear to Ausculation Bilateral, NORMAL BREATHING PATTERN.  

absent: Rales, Rhonchi, Wheezes





- Cardiovascular Exam


Cardiovascular Exam: REGULAR RHYTHM, RRR, +S1, +S2





- GI/Abdominal Exam


GI & Abdominal Exam: Soft, Normal Bowel Sounds.  absent: Distended, Tenderness





- Extremities Exam


Additional comments: 


No c/c/e.  adequate distal pulses.








- Neurological Exam


Neurological Exam: Alert, Awake, CN II-XII Intact


Additional comments: 


AAO x 1.  slow recognition and thought process.  Very forgetful.  Periods of 

agitation.  AAOx2 when calm however.








- Psychiatric Exam


Psychiatric exam: Normal Affect, Normal Mood





- Skin


Skin Exam: Intact, Normal Color





Assessment and Plan





- Assessment and Plan (Free Text)


Assessment: 


89 yo  female with a fall at home and possible seizure and current 

finding of UTI with Morganella.  Started on Rocephin for antibiotic treatment.  

Supportive care.





Patient on Keppra for seizure prophylaxis.





Patient with periods of being agitated and anxious and periods of being calm.  

She has been more emotionally labile.





Continue with Rocephin for treatment of UTI for total of 7 days.  Nearing 

completion.





Thank you for allowing me to participate in the care of the patient, we will 

follow with you.

## 2017-08-07 VITALS — RESPIRATION RATE: 18 BRPM

## 2017-08-07 PROCEDURE — F08Z1FZ DRESSING TECHNIQUES TREATMENT USING ASSISTIVE, ADAPTIVE, SUPPORTIVE OR PROTECTIVE EQUIPMENT: ICD-10-PCS | Performed by: INTERNAL MEDICINE

## 2017-08-07 RX ADMIN — INSULIN HUMAN SCH UNITS: 100 INJECTION, SOLUTION PARENTERAL at 06:31

## 2017-08-07 RX ADMIN — POLYETHYLENE GLYCOL 3350 SCH GM: 17 POWDER, FOR SOLUTION ORAL at 10:01

## 2017-08-07 RX ADMIN — CEFTRIAXONE SCH MLS/HR: 1 INJECTION, SOLUTION INTRAVENOUS at 05:26

## 2017-08-07 RX ADMIN — Medication SCH MG: at 10:02

## 2017-08-07 RX ADMIN — INSULIN HUMAN SCH: 100 INJECTION, SOLUTION PARENTERAL at 18:06

## 2017-08-07 RX ADMIN — INSULIN HUMAN SCH UNITS: 100 INJECTION, SOLUTION PARENTERAL at 12:30

## 2017-08-07 RX ADMIN — INSULIN HUMAN SCH: 100 INJECTION, SOLUTION PARENTERAL at 22:17

## 2017-08-07 RX ADMIN — PANTOPRAZOLE SODIUM SCH MG: 40 TABLET, DELAYED RELEASE ORAL at 05:27

## 2017-08-07 NOTE — CP.PCM.PN
Subjective





- Date & Time of Evaluation


Date of Evaluation: 08/07/17


Time of Evaluation: 17:00





- Subjective


Subjective: 


Infectious Disease Follow Up:


August 7, 2017





87 yo  female with extensive medical history that includes hypertension

, atrial fibrillation, diabetes, hyperlipidemia, anxiety, breast cancer history

, chronic hearing difficulties, and cognitive impairment sent to hospital after 

a fall at home.  She was evaluated for potential seizures.  A urinary tract 

infection with Morganella was found and is currently being treated with 

Rocephin.  The patient was transferred to the [facility] for deconditioned state.  My 

understanding is that the patient lives alone.  It appears that the patient may 

not be able to live on her own.





She remains distressed when seen today.  Reviewing nursing notes, the patient 

has periods where she is calm.  To me she appears to overall agitated and at 

times confused.  She is nearing completion of her antibiotics.  She appears a 

little calmer today than previous days.  When she is calm, she is able to make 

what appears to be clear and competent decisions.  Question of whether the 

patient is competent to live on her own.





Objective





- Vital Signs/Intake and Output


Vital Signs (last 24 hours): 


 











Temp Pulse Resp BP Pulse Ox


 


 97.9 F   62   18   125/61   98 


 


 08/07/17 10:00  08/07/17 17:06  08/07/17 10:00  08/07/17 17:06  08/07/17 10:00








Intake and Output: 


 











 08/07/17 08/07/17





 06:59 18:59


 


Intake Total 250 


 


Balance 250 














- Medications


Medications: 


 Current Medications





Acetaminophen (Tylenol 325mg Tab)  650 mg PO Q6H PRN; Protocol


   PRN Reason: Pain, moderate (4-7)


   Last Admin: 08/06/17 04:26 Dose:  650 mg


Donepezil HCl (Aricept)  5 mg PO HS JOSHUA


   PRN Reason: Protocol


   Last Admin: 08/06/17 21:17 Dose:  5 mg


Ceftriaxone Sodium (Rocephin 1 Gram Ivpb)  1 gm in 100 mls @ 100 mls/hr IVPB 

0600 JOSHUA


   PRN Reason: Protocol


   Last Admin: 08/07/17 05:26 Dose:  100 mls/hr


Insulin Human Regular (Humulin R Low)  0 units SC ACHS JOSHUA


   PRN Reason: Protocol


   Last Admin: 08/07/17 18:06 Dose:  Not Given


Levetiracetam (Keppra)  500 mg PO BID JOSHUA


   PRN Reason: Protocol


   Last Admin: 08/07/17 17:39 Dose:  500 mg


Losartan Potassium (Cozaar)  100 mg PO DAILY JOSHUA


   PRN Reason: Protocol


   Last Admin: 08/07/17 10:04 Dose:  100 mg


Magnesium Oxide (Mag-Ox)  400 mg PO DAILY JOSHUA


   PRN Reason: Protocol


   Last Admin: 08/07/17 10:02 Dose:  400 mg


Pantoprazole Sodium (Protonix Ec Tab)  40 mg PO 0600 JOSHUA


   PRN Reason: Protocol


   Last Admin: 08/07/17 05:27 Dose:  40 mg


Polyethylene Glycol (Miralax)  17 gm PO DAILY JOSHUA


   PRN Reason: Protocol


   Last Admin: 08/07/17 10:01 Dose:  17 gm


Risperidone (Risperdal Tab)  0.5 mg PO AMHS PRN; Protocol


   PRN Reason: Agitation


   Last Admin: 08/05/17 21:50 Dose:  0.5 mg


Sotalol HCl (Betapace)  80 mg PO BID JOSHUA


   PRN Reason: Protocol


   Last Admin: 08/07/17 17:06 Dose:  80 mg


Warfarin Sodium (Coumadin)  3 mg PO 1800 JOSHUA


   PRN Reason: Protocol


   Last Admin: 08/07/17 17:07 Dose:  3 mg











- Labs


Labs: 


 





 08/06/17 06:00 





 08/06/17 06:00 





 











PT  16.8 Seconds (9.9-11.8)  H  08/06/17  06:00    


 


INR  1.56  (0.93-1.08)  H  08/06/17  06:00    














- Constitutional


Appears: Non-toxic, No Acute Distress, Chronically Ill





- Head Exam


Head Exam: ATRAUMATIC, NORMOCEPHALIC





- Eye Exam


Eye Exam: EOMI, PERRL


Pupil Exam: NORMAL ACCOMODATION, PERRL





- ENT Exam


ENT Exam: Mucous Membranes Moist, Normal External Ear Exam, TM's Normal 

Bilaterally





- Neck Exam


Neck Exam: Full ROM, Normal Inspection





- Respiratory Exam


Respiratory Exam: Clear to Ausculation Bilateral, NORMAL BREATHING PATTERN.  

absent: Rales, Rhonchi, Wheezes





- Cardiovascular Exam


Cardiovascular Exam: REGULAR RHYTHM, RRR, +S1, +S2





- GI/Abdominal Exam


GI & Abdominal Exam: Soft, Normal Bowel Sounds.  absent: Distended, Tenderness





- Extremities Exam


Additional comments: 


No c/c/e.  adequate distal pulses.








- Neurological Exam


Neurological Exam: Alert, Awake, CN II-XII Intact


Additional comments: 


AAO x 1.  slow recognition and thought process.  Very forgetful.  Periods of 

agitation.  AAOx2 when calm however.





- Psychiatric Exam


Psychiatric exam: Normal Affect, Normal Mood





- Skin


Skin Exam: Intact, Normal Color





Assessment and Plan





- Assessment and Plan (Free Text)


Assessment: 


87 yo  female with a fall at home and possible seizure and current 

finding of UTI with Morganella.  Started on Rocephin for antibiotic treatment.  

Supportive care.





Patient on Keppra for seizure prophylaxis.





Patient with periods of being agitated and anxious and periods of being calm.  

She has been more emotionally labile.





Continue with Rocephin for treatment of UTI for total of 7 days.  Completed.





Thank you for allowing me to participate in the care of the patient, we will 

follow with you.

## 2017-08-08 VITALS
HEART RATE: 69 BPM | DIASTOLIC BLOOD PRESSURE: 74 MMHG | TEMPERATURE: 99.9 F | SYSTOLIC BLOOD PRESSURE: 158 MMHG | OXYGEN SATURATION: 97 %

## 2017-08-08 RX ADMIN — POLYETHYLENE GLYCOL 3350 SCH GM: 17 POWDER, FOR SOLUTION ORAL at 10:42

## 2017-08-08 RX ADMIN — INSULIN HUMAN SCH UNITS: 100 INJECTION, SOLUTION PARENTERAL at 09:22

## 2017-08-08 RX ADMIN — CEFTRIAXONE SCH MLS/HR: 1 INJECTION, SOLUTION INTRAVENOUS at 05:30

## 2017-08-08 RX ADMIN — Medication SCH MG: at 10:42

## 2017-08-08 RX ADMIN — PANTOPRAZOLE SODIUM SCH MG: 40 TABLET, DELAYED RELEASE ORAL at 05:30

## 2017-08-08 NOTE — CP.PCM.PN
Subjective





- Date & Time of Evaluation


Date of Evaluation: 07/08/17


Time of Evaluation: 06:30





- Subjective


Subjective: 











87 yo  female with extensive medical history that includes hypertension

, atrial fibrillation, diabetes, hyperlipidemia, anxiety, breast cancer history

, chronic hearing difficulties, and cognitive impairment sent to hospital after 

a fall at home.  She was evaluated for potential seizures.  A urinary tract 

infection with Morganella was found and is currently being treated with 

Rocephin.  The patient was transferred to the [facility] for deconditioned state.  My 

understanding is that the patient lives alone.  It appears that the patient may 

not be able to live on her own.





She remains distressed when seen today.  Reviewing nursing notes, the patient 

has periods where she is calm.  To me she appears to overall agitated and at 

times confused.  She is nearing completion of her antibiotics.  She appears a 

little calmer today than previous days.  When she is calm, she is able to make 

what appears to be clear and competent decisions.  Question of whether the 

patient is competent to live alone 





Objective





- Vital Signs/Intake and Output


Vital Signs (last 24 hours): 


 











Temp Pulse Resp BP Pulse Ox


 


 97.9 F   62   18   125/61   98 


 


 08/07/17 10:00  08/07/17 17:06  08/07/17 10:00  08/07/17 17:06  08/07/17 10:00











- Medications


Medications: 


 Current Medications





Acetaminophen (Tylenol 325mg Tab)  650 mg PO Q6H PRN; Protocol


   PRN Reason: Pain, moderate (4-7)


   Last Admin: 08/07/17 23:38 Dose:  650 mg


Donepezil HCl (Aricept)  5 mg PO HS JOSHUA


   PRN Reason: Protocol


   Last Admin: 08/07/17 22:23 Dose:  5 mg


Ceftriaxone Sodium (Rocephin 1 Gram Ivpb)  1 gm in 100 mls @ 100 mls/hr IVPB 

0600 JOSHUA


   PRN Reason: Protocol


   Last Admin: 08/07/17 05:26 Dose:  100 mls/hr


Insulin Human Regular (Humulin R Low)  0 units SC ACHS JOSHUA


   PRN Reason: Protocol


   Last Admin: 08/07/17 22:17 Dose:  Not Given


Levetiracetam (Keppra)  500 mg PO BID JOSHUA


   PRN Reason: Protocol


   Last Admin: 08/07/17 17:39 Dose:  500 mg


Losartan Potassium (Cozaar)  100 mg PO DAILY JOSHUA


   PRN Reason: Protocol


   Last Admin: 08/07/17 10:04 Dose:  100 mg


Magnesium Oxide (Mag-Ox)  400 mg PO DAILY JOSHUA


   PRN Reason: Protocol


   Last Admin: 08/07/17 10:02 Dose:  400 mg


Pantoprazole Sodium (Protonix Ec Tab)  40 mg PO 0600 JOSHUA


   PRN Reason: Protocol


   Last Admin: 08/07/17 05:27 Dose:  40 mg


Polyethylene Glycol (Miralax)  17 gm PO DAILY JOSHUA


   PRN Reason: Protocol


   Last Admin: 08/07/17 10:01 Dose:  17 gm


Risperidone (Risperdal Tab)  0.5 mg PO AMHS PRN; Protocol


   PRN Reason: Agitation


   Last Admin: 08/05/17 21:50 Dose:  0.5 mg


Sotalol HCl (Betapace)  80 mg PO BID JOSHUA


   PRN Reason: Protocol


   Last Admin: 08/07/17 17:06 Dose:  80 mg


Warfarin Sodium (Coumadin)  3 mg PO 1800 JOSHUA


   PRN Reason: Protocol


   Last Admin: 08/07/17 17:07 Dose:  3 mg











- Labs


Labs: 


 





 08/06/17 06:00 





 08/06/17 06:00 





 











PT  16.8 Seconds (9.9-11.8)  H  08/06/17  06:00    


 


INR  1.56  (0.93-1.08)  H  08/06/17  06:00    














- Constitutional


Appears: Well





- Head Exam


Head Exam: ATRAUMATIC, NORMAL INSPECTION, NORMOCEPHALIC





- Eye Exam


Eye Exam: EOMI, Normal appearance, PERRL


Pupil Exam: NORMAL ACCOMODATION, PERRL





- ENT Exam


ENT Exam: Mucous Membranes Moist, Normal Exam





- Neck Exam


Neck Exam: Full ROM, Normal Inspection.  absent: Lymphadenopathy





- Respiratory Exam


Respiratory Exam: Clear to Ausculation Bilateral, NORMAL BREATHING PATTERN





- Cardiovascular Exam


Cardiovascular Exam: REGULAR RHYTHM, +S1, +S2.  absent: Murmur





- GI/Abdominal Exam


GI & Abdominal Exam: Soft, Normal Bowel Sounds.  absent: Tenderness





- Rectal Exam


Rectal Exam: NORMAL INSPECTION





-  Exam


 Exam: Circumcision, NORMAL INSPECTION


External exam: NORMAL EXTERNAL EXAM


Speculum exam: NORMAL SPECULUM EXAM


Bimanual exam: NORMAL BIMANUAL EXAM





- Extremities Exam


Extremities Exam: Full ROM, Normal Capillary Refill, Normal Inspection.  absent

: Joint Swelling, Pedal Edema





- Back Exam


Back Exam: NORMAL INSPECTION





- Neurological Exam


Neurological Exam: Alert, Awake, CN II-XII Intact, Normal Gait, Oriented x3





- Psychiatric Exam


Psychiatric exam: Normal Affect, Normal Mood





- Skin


Skin Exam: Dry, Intact, Normal Color, Warm





Assessment and Plan





- Assessment and Plan (Free Text)


Assessment: 





Assessment: 


87 yo  female with a fall at home and possible seizure and current 

finding of UTI with Morganella.  Started on Rocephin for antibiotic treatment.  

Supportive care.





Patient on Keppra for seizure prophylaxis.





Patient with periods of being agitated and anxious and periods of being calm.  

She has been more emotionally labile.





Continue with Rocephin for treatment of UTI for total of 7 days.  Completed.





Thank you for allowing me to participate in the care of the patient, we will 

follow with you.

## 2017-08-08 NOTE — CP.PCM.PN
Subjective





- Date & Time of Evaluation


Date of Evaluation: 08/08/17


Time of Evaluation: 14:00





- Subjective


Subjective: 


Infectious Disease Follow Up:


August 8, 2017





89 yo  female with extensive medical history that includes hypertension

, atrial fibrillation, diabetes, hyperlipidemia, anxiety, breast cancer history

, chronic hearing difficulties, and cognitive impairment sent to hospital after 

a fall at home.  She was evaluated for potential seizures.  A urinary tract 

infection with Morganella was found and is currently being treated with 

Rocephin.  The patient was transferred to the [facility] for deconditioned state.  My 

understanding is that the patient lives alone.  It appears that the patient may 

not be able to live on her own.





She remains distressed when seen today.  Reviewing nursing notes, the patient 

has periods where she is calm.  To me she appears to overall agitated and at 

times confused.  She is nearing completion of her antibiotics.  She appears a 

little calmer today than previous days.  When she is calm, she is able to make 

what appears to be clear and competent decisions.  Question of whether the 

patient is competent to live on her own.








Objective





- Vital Signs/Intake and Output


Vital Signs (last 24 hours): 


 











Temp Pulse Resp BP Pulse Ox


 


 99.9 F H  69   18   158/74 H  97 


 


 08/08/17 10:45  08/08/17 10:45  08/08/17 10:45  08/08/17 10:45  08/08/17 10:45











- Labs


Labs: 


 





 08/06/17 06:00 





 08/06/17 06:00 





 











PT  16.8 Seconds (9.9-11.8)  H  08/06/17  06:00    


 


INR  1.56  (0.93-1.08)  H  08/06/17  06:00    














- Constitutional


Appears: Non-toxic, No Acute Distress, Chronically Ill





- Head Exam


Head Exam: ATRAUMATIC, NORMOCEPHALIC





- Eye Exam


Eye Exam: EOMI, PERRL


Pupil Exam: NORMAL ACCOMODATION, PERRL





- ENT Exam


ENT Exam: Mucous Membranes Moist, Normal External Ear Exam, TM's Normal 

Bilaterally





- Neck Exam


Neck Exam: Full ROM, Normal Inspection





- Respiratory Exam


Respiratory Exam: Clear to Ausculation Bilateral, NORMAL BREATHING PATTERN.  

absent: Rales, Rhonchi, Wheezes





- Cardiovascular Exam


Cardiovascular Exam: REGULAR RHYTHM, RRR, +S1, +S2





- GI/Abdominal Exam


GI & Abdominal Exam: Soft, Normal Bowel Sounds.  absent: Distended, Tenderness





- Extremities Exam


Additional comments: 


No c/c/e.  adequate distal pulses.








- Neurological Exam


Neurological Exam: Alert, Awake, CN II-XII Intact


Additional comments: 


AAO x 1.  slow recognition and thought process.  Very forgetful.  Periods of 

agitation.  AAOx2 when calm however.








- Psychiatric Exam


Psychiatric exam: Normal Affect, Normal Mood





- Skin


Skin Exam: Intact, Normal Color





Assessment and Plan





- Assessment and Plan (Free Text)


Assessment: 


89 yo  female with a fall at home and possible seizure and current 

finding of UTI with Morganella.  Started on Rocephin for antibiotic treatment.  

Supportive care.





Patient on Keppra for seizure prophylaxis.





Patient with periods of being agitated and anxious and periods of being calm.  

She has been more emotionally labile.





Continue with Rocephin for treatment of UTI for total of 7 days.  Completed.





Thank you for allowing me to participate in the care of the patient, we will 

follow with you.

## 2018-04-10 ENCOUNTER — HOSPITAL ENCOUNTER (OUTPATIENT)
Dept: HOSPITAL 42 - ED | Age: 83
Setting detail: OBSERVATION
LOS: 2 days | Discharge: HOME | End: 2018-04-12
Attending: INTERNAL MEDICINE | Admitting: INTERNAL MEDICINE
Payer: MEDICARE

## 2018-04-10 VITALS — BODY MASS INDEX: 20 KG/M2

## 2018-04-10 VITALS — HEART RATE: 64 BPM

## 2018-04-10 DIAGNOSIS — I10: ICD-10-CM

## 2018-04-10 DIAGNOSIS — Z90.11: ICD-10-CM

## 2018-04-10 DIAGNOSIS — Z91.81: ICD-10-CM

## 2018-04-10 DIAGNOSIS — Z79.01: ICD-10-CM

## 2018-04-10 DIAGNOSIS — I48.91: ICD-10-CM

## 2018-04-10 DIAGNOSIS — R56.9: ICD-10-CM

## 2018-04-10 DIAGNOSIS — E11.9: ICD-10-CM

## 2018-04-10 DIAGNOSIS — Z85.3: ICD-10-CM

## 2018-04-10 DIAGNOSIS — Z86.73: ICD-10-CM

## 2018-04-10 DIAGNOSIS — Z91.19: ICD-10-CM

## 2018-04-10 DIAGNOSIS — R29.6: ICD-10-CM

## 2018-04-10 DIAGNOSIS — H91.90: ICD-10-CM

## 2018-04-10 DIAGNOSIS — F03.90: Primary | ICD-10-CM

## 2018-04-10 DIAGNOSIS — J44.9: ICD-10-CM

## 2018-04-10 LAB
ALBUMIN SERPL-MCNC: 3.9 G/DL (ref 3–4.8)
ALBUMIN/GLOB SERPL: 1.3 {RATIO} (ref 1.1–1.8)
ALT SERPL-CCNC: 28 U/L (ref 7–56)
APTT BLD: 28.4 SECONDS (ref 25.1–36.5)
AST SERPL-CCNC: 25 U/L (ref 14–36)
BASOPHILS # BLD AUTO: 0.06 K/MM3 (ref 0–2)
BASOPHILS NFR BLD: 0.7 % (ref 0–3)
BUN SERPL-MCNC: 25 MG/DL (ref 7–21)
CALCIUM SERPL-MCNC: 9.9 MG/DL (ref 8.4–10.5)
EOSINOPHIL # BLD: 0.2 10*3/UL (ref 0–0.7)
EOSINOPHIL NFR BLD: 1.9 % (ref 1.5–5)
ERYTHROCYTE [DISTWIDTH] IN BLOOD BY AUTOMATED COUNT: 13 % (ref 11.5–14.5)
GFR NON-AFRICAN AMERICAN: > 60
GRANULOCYTES # BLD: 6.46 10*3/UL (ref 1.4–6.5)
GRANULOCYTES NFR BLD: 72.2 % (ref 50–68)
HGB BLD-MCNC: 12.7 G/DL (ref 12–16)
INR PPP: 1.04 (ref 0.93–1.08)
LYMPHOCYTES # BLD: 1.1 10*3/UL (ref 1.2–3.4)
LYMPHOCYTES NFR BLD AUTO: 12.4 % (ref 22–35)
MCH RBC QN AUTO: 30.4 PG (ref 25–35)
MCHC RBC AUTO-ENTMCNC: 33.9 G/DL (ref 31–37)
MCV RBC AUTO: 89.7 FL (ref 80–105)
MONOCYTES # BLD AUTO: 1.1 10*3/UL (ref 0.1–0.6)
MONOCYTES NFR BLD: 12.8 % (ref 1–6)
PLATELET # BLD: 210 10^3/UL (ref 120–450)
PMV BLD AUTO: 10 FL (ref 7–11)
PROTHROMBIN TIME: 11.9 SECONDS (ref 9.4–12.5)
RBC # BLD AUTO: 4.18 10^6/UL (ref 3.5–6.1)
TROPONIN I SERPL-MCNC: 0.01 NG/ML
WBC # BLD AUTO: 8.9 10^3/UL (ref 4.5–11)

## 2018-04-10 PROCEDURE — 87040 BLOOD CULTURE FOR BACTERIA: CPT

## 2018-04-10 PROCEDURE — 85730 THROMBOPLASTIN TIME PARTIAL: CPT

## 2018-04-10 PROCEDURE — 84484 ASSAY OF TROPONIN QUANT: CPT

## 2018-04-10 PROCEDURE — 84100 ASSAY OF PHOSPHORUS: CPT

## 2018-04-10 PROCEDURE — 82948 REAGENT STRIP/BLOOD GLUCOSE: CPT

## 2018-04-10 PROCEDURE — 85025 COMPLETE CBC W/AUTO DIFF WBC: CPT

## 2018-04-10 PROCEDURE — 93005 ELECTROCARDIOGRAM TRACING: CPT

## 2018-04-10 PROCEDURE — 80053 COMPREHEN METABOLIC PANEL: CPT

## 2018-04-10 PROCEDURE — 83615 LACTATE (LD) (LDH) ENZYME: CPT

## 2018-04-10 PROCEDURE — 99285 EMERGENCY DEPT VISIT HI MDM: CPT

## 2018-04-10 PROCEDURE — 84443 ASSAY THYROID STIM HORMONE: CPT

## 2018-04-10 PROCEDURE — 70450 CT HEAD/BRAIN W/O DYE: CPT

## 2018-04-10 PROCEDURE — 82550 ASSAY OF CK (CPK): CPT

## 2018-04-10 PROCEDURE — 83735 ASSAY OF MAGNESIUM: CPT

## 2018-04-10 PROCEDURE — 85610 PROTHROMBIN TIME: CPT

## 2018-04-10 PROCEDURE — 71045 X-RAY EXAM CHEST 1 VIEW: CPT

## 2018-04-10 PROCEDURE — 97161 PT EVAL LOW COMPLEX 20 MIN: CPT

## 2018-04-10 PROCEDURE — 97116 GAIT TRAINING THERAPY: CPT

## 2018-04-10 PROCEDURE — 96374 THER/PROPH/DIAG INJ IV PUSH: CPT

## 2018-04-10 NOTE — ED PDOC
Arrival/HPI





- General


Chief Complaint: Altered Mental Status


Time Seen by Provider: 04/10/18 19:46


Historian: Patient





- History of Present Illness


Narrative History of Present Illness (Text): 


04/10/18 20:15


Nohelia Vivas is an 88 year old female, whose past medical history includes 

hypertension, atrial fibrillation, COPD, CVA, seizures, diabetes, breast cancer 

s/p radical mastectomy, dementia, and multiple falls, who presents to the 

Emergency department brought in by EMS for altered mental status tonight. As 

per EMS, they were notified by patient's  who states patient 

was calling him repeatedly tonight. Patient denies any chest pain, abdominal 

pain, vomiting, or diarrhea. Limited HPI and ROS secondary to patient's altered 

mental status. 


Time/Duration: Other (tonight)


Symptom Onset: Gradual


Symptom Course: Unchanged


Activities at Onset: Light


Context: Home





Past Medical History





- Provider Review


Nursing Documentation Reviewed: Yes





- Infectious Disease


Hx of Infectious Diseases: None





- Tetanus Immunization


Tetanus Immunization: Unknown





- Reproductive


Menopause: Yes





- Cardiac


Hx Cardiac Disorders: Yes (Afib (on Coumadin))


Hx Hypertension: Yes





- Pulmonary


Hx Chronic Obstructive Pulmonary Disease (COPD): Yes





- Neurological


HX Cerebrovascular Accident: Yes





- HEENT


Hx HEENT Disorder: Yes (Hard of hearing)





- Renal


Hx Renal Disorder: No





- Endocrine/Metabolic


Hx Diabetes Mellitus Type 2: Yes





- Hematological/Oncological


Hx Cancer: Yes (Breast CA)


Hx Chemotherapy: Yes





- Integumentary


Hx Dermatological Disorder: No





- Musculoskeletal/Rheumatological


Hx Falls: Yes


Hx Fractures: Yes (Right ankle)


Hx Osteoarthritis: Yes





- Gastrointestinal


Hx Gastrointestinal Disorders: Yes (POOR APPETTITE,)





- Genitourinary/Gynecological


Hx Urinary Tract Infection: Yes





- Psychiatric


Hx Psychophysiologic Disorder: No


Hx Substance Use: No





- Surgical History


Hx Mastectomy: Yes





- Anesthesia


Hx Anesthesia: Yes


Hx Anesthesia Reactions: No


Hx Malignant Hyperthermia: No





- Suicidal Assessment


Feels Threatened In Home Enviroment: No





Family/Social History





- Physician Review


Nursing Documentation Reviewed: Yes


Family/Social History: Unknown Family HX


Smoking Status: Never Smoked


Hx Alcohol Use: No


Hx Substance Use: No


Hx Substance Use Treatment: No





Allergies/Home Meds


Allergies/Adverse Reactions: 


Allergies





FISH Allergy (Verified 07/26/17 17:07)


 NAUSEA


Sulfa (Sulfonamide Antibiotics) Allergy (Verified 07/26/17 17:07)


 HEADACHE











Review of Systems





- Review of Systems


Systems not reviewed;Unavailable: Altered Mental Status


Respiratory: absent: SOB, Cough


Cardiovascular: absent: Chest Pain


Gastrointestinal: absent: Abdominal Pain, Diarrhea, Nausea, Vomiting


Musculoskeletal: absent: Back Pain, Neck Pain


Skin: absent: Rash


Neurological: Other (+altered mental status)





Physical Exam


Vital Signs Reviewed: Yes


Vital Signs











  Temp Pulse Resp BP Pulse Ox


 


 04/11/18 02:16  98 F  76  18  121/67  95


 


 04/10/18 23:12  98 F  85  18  127/60  95


 


 04/10/18 20:00  98.2 F  77  20  169/85 H  98











Temperature: Afebrile


Blood Pressure: Normal


Pulse: Regular


Respiratory Rate: Normal


Appearance: Positive for: Well-Appearing, Non-Toxic, Comfortable


Pain Distress: None


Mental Status: Positive for: Alert and Oriented X 3


Finger Stick Blood Glucose: 92





- Systems Exam


Head: Present: Atraumatic, Normocephalic


Pupils: Present: PERRL


Extroacular Muscles: Present: EOMI


Conjunctiva: Present: Normal


Mouth: Present: Moist Mucous Membranes


Neck: Present: Normal Range of Motion


Respiratory/Chest: Present: Clear to Auscultation, Good Air Exchange.  No: 

Respiratory Distress, Accessory Muscle Use


Cardiovascular: Present: Regular Rate and Rhythm, Normal S1, S2.  No: Murmurs


Abdomen: No: Tenderness, Distention, Peritoneal Signs


Back: Present: Normal Inspection


Upper Extremity: Present: Normal Inspection.  No: Cyanosis, Edema


Lower Extremity: Present: Normal Inspection.  No: Edema


Neurological: Present: GCS=15, CN II-XII Intact, Speech Normal


Skin: Present: Warm, Dry, Normal Color.  No: Rashes


Psychiatric: Present: Alert, Oriented x 3, Normal Insight, Normal Concentration





Medical Decision Making


ED Course and Treatment: 


04/10/18 20:15


Impression:


88 year old female brought in for altered mental status.





Plan:


-- CT Head w/o contrast


-- EKG


-- CXR


-- Labs, cardiac enzymes, alcohol level, TSH, blood cultures


-- Urinalysis, urine drug screen, urine cultures


-- Reassess and disposition





Prior Visits:


Notes and results from previous visits were reviewed.


On 07/26/2017, pt was seen in the Emergency department s/p syncopal/seizure 

disorder. Pt was admitted to the hospital for further evaluation.





Progress Notes:


04/10/18 21:02


Chest X-ray reviewed, shows no acute processes.





04/10/18 23:07


CT Head shows:


Brain: There is mild diffuse cerebral atrophy present, consistent with this 

patient's age. There is


moderate diffuse heterogeneity of the white matter attenuation, consistent with 

chronic white matter


ischemic changes. No hemorrhage.


Ventricles: The ventricular system demonstrates mild diffuse compensatory 

enlargement.


Bones/joints: Unremarkable. No acute fracture.


Soft tissues: Unremarkable.


Sinuses: Unremarkable as visualized. No acute sinusitis.


Mastoid air cells: Unremarkable as visualized. No mastoid effusion.


IMPRESSION:


Age-related atrophy and chronic white matter ischemic changes, with no evidence 

of an acute


intracranial abnormality.





04/10/18 23:09


Reviewed EKG, NSR at 83 bpm. 1st degree AV block. LAD. Non-specific ST/T wave 

changes. 





04/10/18 23:48


Case discussed with Dr. Muller, who is aware and agrees with plan. Accepts pt 

in to her service. Pt will go to [facility] observation for altered mental 

status. Requests Dr. Valera on consult.





- Lab Interpretations


Microbiology Results: 


Microbiology Results





04/10/18 22:13   Blood-Venous   Blood Culture - Preliminary


                            NO GROWTH AFTER 24 HOURS


04/10/18 20:42   Blood-Venous   Blood Culture - Preliminary


                            NO GROWTH AFTER 24 HOURS








Lab Results: 








 04/10/18 20:42 





 04/10/18 20:42 





 Lab Results





04/10/18 20:42: Acetaminophen < 10.0 L


04/10/18 20:42: TSH 3rd Generation 0.23 L, Alcohol, Quantitative < 10


04/10/18 20:42: Sodium 143, Potassium 4.1, Chloride 105, Carbon Dioxide 28, 

Anion Gap 15, BUN 25 H, Creatinine 0.7, Est GFR (African Amer) > 60, Est GFR (

Non-Af Amer) > 60, Random Glucose 91, Calcium 9.9, Phosphorus 3.3, Magnesium 1.7

, Total Bilirubin 0.4, AST 25, ALT 28, Alkaline Phosphatase 59, Lactate 

Dehydrogenase 660, Total Creatine Kinase 57, Troponin I 0.01, Total Protein 6.7

, Albumin 3.9, Globulin 2.9, Albumin/Globulin Ratio 1.3


04/10/18 20:42: PT 11.9, INR 1.04, APTT 28.4


04/10/18 20:42: WBC 8.9, RBC 4.18, Hgb 12.7, Hct 37.5, MCV 89.7, MCH 30.4, MCHC 

33.9, RDW 13.0, Plt Count 210, MPV 10.0, Gran % 72.2 H, Lymph % (Auto) 12.4 L, 

Mono % (Auto) 12.8 H, Eos % (Auto) 1.9, Baso % (Auto) 0.7, Gran # 6.46, Lymph # 

(Auto) 1.1 L, Mono # (Auto) 1.1 H, Eos # (Auto) 0.2, Baso # (Auto) 0.06


04/10/18 19:53: POC Glucose (mg/dL) 92








I have reviewed the lab results: Yes





- RAD Interpretation


Radiology Orders: 








04/10/18 20:15


HEAD W/O CONTRAST [CT] Stat 





04/10/18 20:16


CHEST PORTABLE [RAD] Stat 











: ED Physician





- EKG Interpretation


Interpreted by ED Physician: Yes


Type: 12 lead EKG





- Medication Orders


Current Medication Orders: 








Acetaminophen (Tylenol 325mg Tab)  650 mg PO Q6H PRN


   PRN Reason: Pain, Mild (1-3)


Donepezil HCl (Aricept)  5 mg PO HS [facility]


   Last Admin: 04/11/18 22:22  Dose: 5 mg





Dextrose/Sodium Chloride (Dextrose 5%/0.45% Ns 1000 Ml)  1,000 mls @ 60 mls/hr 

IV .[id number] [facility]


   Last Admin: 04/11/18 22:53  Dose: 60 mls/hr





eMAR Start Stop


 Document     04/11/18 22:53  [facility]  (Rec: 04/11/18 22:53  [facility]-[id number])


     Intravenous Solution


      Start Date                                 04/11/18


      Start Time                                 22:53





Insulin Human Lispro (Humalog Low)  0 units SC ACHS [facility]


   PRN Reason: Protocol


   Last Admin: 04/11/18 22:54 Dose:  Not Given


   Non-Admin Reason: Blood Sugar Parameter





MAR Blood Glucose


 Document     04/11/18 22:54  [facility]  (Rec: 04/11/18 22:55  [facility]-[id number])


     Blood Glucose


      Finger Stick Blood Glucose ()        144





Levetiracetam (Keppra)  500 mg PO BID [facility]


   Last Admin: 04/11/18 18:11  Dose: 500 mg





Losartan Potassium (Cozaar)  25 mg PO DAILY [facility]


   Last Admin: 04/11/18 11:14  Dose: 25 mg





MAR Pulse and Blood Pressure


 Document     04/11/18 11:14  [facility]  (Rec: 04/11/18 11:14  [id number][facility]


     Pulse


      Pulse Rate (60-90)                         81


     Blood Pressure


      Blood Pressure (100//90)             147/70





Pantoprazole Sodium (Protonix Ec Tab)  40 mg PO DAILY [facility]


   Last Admin: 04/11/18 11:13  Dose: 40 mg





Polyethylene Glycol (Miralax)  17 gm PO DAILY [facility]


   Last Admin: 04/11/18 11:12  Dose: 17 gm





Risperidone (Risperdal Tab)  0.5 mg PO AMHS [facility]


   PRN Reason: Protocol


   Last Admin: 04/11/18 22:22  Dose: 0.5 mg





Behavioural


 Document     04/11/18 22:22  MAD  (Rec: 04/11/18 22:22  MAD  [id number])


     Maintenance


      Maintenance Dose                           Yes


     Behavior


      Behavior for Medication:                   Anxiety


                                                 Insomnia





Sotalol HCl (Betapace)  80 mg PO DAILY [facility]


   Last Admin: 04/11/18 11:13  Dose: 80 mg





MAR Pulse and Blood Pressure


 Document     04/11/18 11:13  [facility]ATASHA  (Rec: 04/11/18 11:13  [facility]-[id number])


     Pulse


      Pulse Rate (60-90)                         81


     Blood Pressure


      Blood Pressure (100//90)             147/70





Warfarin Sodium (Coumadin)  5 mg PO 1800 [facility]


   Last Admin: 04/11/18 18:11  Dose: 5 mg





Discontinued Medications





Dextrose (Dextrose 50% Inj)  50 ml IVP STAT [facility]


   Stop: 04/11/18 01:14


   Last Admin: 04/11/18 01:15  Dose: 50 ml





IVP Administration


 Document     04/11/18 01:15  SS  (Rec: 04/11/18 02:11  SS  [id number])


     Charges for Administration


      # of IVP Administrations                   1





Sodium Chloride (Sodium Chloride 0.9%)  1,000 mls @ 60 mls/hr IV .[id number] ST[facility]


   Stop: 04/11/18 16:54


   Last Admin: 04/11/18 00:21  Dose: 60 mls/hr





eMAR Start Stop


 Document     04/11/18 00:21  [facility]  (Rec: 04/11/18 00:22  LA  [id number])


     Intravenous Solution


      Start Date                                 04/11/18


      Start Time                                 00:21





Losartan Potassium (Cozaar)  25 mg PO DAILY [facility]


Warfarin Sodium (Coumadin)  5 mg PO 1800 [facility]


   PRN Reason: Protocol











- Scribe Statement


The provider has reviewed the documentation as recorded by the Kareem Harvey





Provider Scribe Attestation:


All medical record entries made by the Scribe were at my direction and 

personally dictated by me. I have reviewed the chart and agree that the record 

accurately reflects my personal performance of the history, physical exam, 

medical decision making, and the department course for this patient. I have 

also personally directed, reviewed, and agree with the discharge instructions 

and disposition.








Disposition/Present on Arrival





- Present on Arrival


Any Indicators Present on Arrival: No


History of DVT/PE: No


History of Uncontrolled Diabetes: No


Urinary Catheter: No


History of Decub. Ulcer: No


History Surgical Site Infection Following: None





- Disposition


Have Diagnosis and Disposition been Completed?: Yes


Diagnosis: 


 Altered mental status





Disposition: HOSPITALIZED


Disposition Time: 23:55


Condition: FAIR

## 2018-04-10 NOTE — CT
EXAM:

  CT Head Without Intravenous Contrast



CLINICAL HISTORY:

  88 years old, female; Signs and symptoms; Altered mental status/memory loss; 

Confusion or disorientation; Additional info: AMS



TECHNIQUE:

  Axial computed tomography images of the head/brain without intravenous 

contrast.  All CT scans at this facility use one or more dose reduction 

techniques, viz.: automated exposure control; ma/kV adjustment per patient size 

(including targeted exams where dose is matched to indication; i.e. head); or 

iterative reconstruction technique.

  Coronal and sagittal reformatted images were created and reviewed.



COMPARISON:

  CT - HEAD W/O CONTRAST 2017-07-26 18:53



FINDINGS:

  Brain:  There is mild diffuse cerebral atrophy present, consistent with this 

patient's age.  There is moderate diffuse heterogeneity of the white matter 

attenuation, consistent with chronic white matter ischemic changes.  No 

hemorrhage.

  Ventricles:  The ventricular system demonstrates mild diffuse compensatory 

enlargement.

  Bones/joints:  Unremarkable.  No acute fracture.

  Soft tissues:  Unremarkable.

  Sinuses:  Unremarkable as visualized.  No acute sinusitis.

  Mastoid air cells:  Unremarkable as visualized.  No mastoid effusion.



IMPRESSION:     

Age-related atrophy and chronic white matter ischemic changes, with no evidence 

of an acute intracranial abnormality.

.

## 2018-04-11 RX ADMIN — PANTOPRAZOLE SODIUM SCH MG: 40 TABLET, DELAYED RELEASE ORAL at 11:13

## 2018-04-11 RX ADMIN — DEXTROSE AND SODIUM CHLORIDE SCH MLS/HR: 5; 450 INJECTION, SOLUTION INTRAVENOUS at 02:11

## 2018-04-11 RX ADMIN — DEXTROSE AND SODIUM CHLORIDE SCH MLS/HR: 5; 450 INJECTION, SOLUTION INTRAVENOUS at 22:53

## 2018-04-11 RX ADMIN — INSULIN LISPRO SCH: 100 INJECTION, SOLUTION INTRAVENOUS; SUBCUTANEOUS at 17:20

## 2018-04-11 RX ADMIN — INSULIN LISPRO SCH: 100 INJECTION, SOLUTION INTRAVENOUS; SUBCUTANEOUS at 22:54

## 2018-04-11 RX ADMIN — POLYETHYLENE GLYCOL 3350 SCH GM: 17 POWDER, FOR SOLUTION ORAL at 11:12

## 2018-04-11 NOTE — CARD
--------------- APPROVED REPORT --------------





EKG Measurement

Heart [id number]

OR [id number]

[id number]

[id number]

[id number]



<Conclusion>

Sinus rhythm with 1st degree AV block

Left axis deviation

possible anterior infarct, age undetermined

Suspect lead reversal V2-3

Abnormal ECG

## 2018-04-11 NOTE — CON
DATE:



HISTORY OF PRESENT ILLNESS:  In short, the patient is an 88-year-old

 female.  The patient has multiple medical issues including atrial

fibrillation, COPD, CVA, seizures, diabetes.  The patient had history of

breast cancer, status post right radical mastectomy, history of multiple

falls.  The patient also has history of altered mental status and delirium.

Of note, the patient prone to have delirium.  The patient is very familiar

to this writer from the previous admission to the medical side and altered

mental status, delirium with visual hallucinations.  The patient was

admitted on the medical side for altered mental status and because the

patient was kept calling to the [employer] of the building during the nighttime

and super had impression that the patient was not doing well and brought

her to the hospital.  The patient was admitted on the medical side for

possible delirium.  A psych consult was called for evaluation of change in

mental status.  The patient was seen and examined.  The patient presented

to have good personal hygiene.  The patient is very hard of hearing.  At

times, the patient was not able to comprehend question because of that. 

The patient was alert and oriented in self, place.  The patient was off

with 1 day of this month.  The patient said that today is 04/10/2018, but

today is 04/11/2018.  The patient correct herself very fast and the patient

explained that she came to the hospital on 04/10/2018.  The patient

remembered this writer, but does not remember her name.  The patient

presented very well during the conversation.  The patient is aware of the

circumstances of her admission to the medical side.  The patient said that

she was not able to close the hot water and that is why she was very

concerned and that is why she was keep calling to the super and this

history is matching with emergency room HPI.  The patient said that she

does not feel confused.  The patient is aware of what is going on with her

from the medical standpoint.  The patient said that she is compliant with

the medications.  The patient said that she is able to manage her bills as

well as she is ordering food from the company called [employer].  The patient

reported that she was not feeling depressed.  The patient has fair

appetite.  The patient reported she has good support in the community.  The

patient said that she has power of , her name is Hillary Suresh.  This

writer was not able to identify that information.  The patient denied

hearing things, denied seeing things.  Of note from the previous admission,

the patient had visual hallucinations.  The patient was seeing dogs, but

this admission it is not the case.  The patient presented very well and

deemed to be at her baseline of functioning.  Temperature 98.2, pulse is

81, blood pressure 147/70, respirations 20, oxygen saturation is 96. 

Medications reviewed.  Tylenol, Aricept 5 mg at the nighttime, Keppra 500

mg twice a day, Cozaar, Protonix, MiraLax, Risperdal 0.5 mg twice a day. 

This writer is not sure if she is taking her medications or not.  Sotalol

as well as Coumadin.  Labs reviewed.



MENTAL STATUS EXAMINATION:  The patient appears to be alert and oriented,

pleasant AND cooperative, intermittent eye contact.  The patient is hard of

hearing.  Speech was monotonic, but normal volume.  Mood described, "I am

feeling fine."  Affect was reactive.  Mood congruent.  Thought process was

goal directed.  At times, the patient was circumstantial.  Thought content,

the patient denied visual, auditory or tactile hallucinations.  The patient

does not present to be psychotic or paranoid, but it was the case last

admission.  Insight and judgment are fair.  Impulses are well controlled.



IMPRESSION:  Most likely, the patient had altered mental status and which

could be related to sleep deprivation, but during the conversation, the

patient was fully alert and oriented, know the circumstances of her

admission.  This writer cannot exclude that the patient was in lucid period

of delirium that is why we need to have clear picture and observe the

patient for at least 24 hours.



PLAN:  As this writer mentioned above, the patient most likely was in

delirium stage, was keep calling to the superintendent.  At the same time,

this writer evaluated the patient and most likely at the lucid periods of

delirium.  The patient presented very well during that period, but this

writer cannot exclude that the patient might have worsening of her mental

status because the patient is prone to have delirium due to multiple

medical issues.  This writer is not sure why Risperdal was started for her.

We will follow up and advise accordingly.  Thank you very much for letting

me participate in the care of your patient.  The patient reported that she

has good support in the community.  The patient is ordering food from the

[employer] delivering services.  The patient said that Hillary Suresh is her power

of .  The patient also said that she has friends in the community

and neighbors who are checking up on the patient.  The patient reported

that she was able to manage her finances as well as denied feeling

depressed, suicidal ideation, homicidal ideation and the patient does not

present to be psychotic.  Social work evaluation is recommended to make

sure that it will be safe discharge plan, but from this writer's

perspective, the patient deemed to be at her baseline of functioning.



Thank you very much for letting me participate in the care of your patient.

Should you have any questions, give me a call back.





__________________________________________

Nancy Reilly MD





DD:  04/11/2018 11:02:24

DT:  04/11/2018 11:06:22

Job # [id number]

## 2018-04-11 NOTE — CON
DATE:



HISTORY OF PRESENT ILLNESS:  This is a 88-year-old female with past medical

history of hypertension, atrial fibrillation, COPD, seizure, diabetes,

status post radicular mastectomy for breast CA, dementia, and came to the

emergency room with altered mental status, and  said she

was calling repeatedly and brought to the hospital.



PAST MEDICAL HISTORY:  As above.



SOCIAL HISTORY:  Does not smoke, does not drink.



ALLERGIES:  FISH AND SULFA DRUGS.



REVIEW OF SYSTEMS:  A 10-point review of systems was negative.



PHYSICAL EXAMINATION:

VITAL SIGNS:  Blood pressure 169/84.

HEENT:  Normocephalic, atraumatic.

NECK:  Supple.

NEUROLOGIC:  Awake, oriented to self and place.  Cranial nerves II to XII

are tested.  Pupils reactive.  EOM intact.  Visual field full.  No facial

asymmetry.  Tongue midline.  Motor examination, spontaneous movement of the

extremities noted.  Deep tendon reflexes 1+.  Both plantars are downgoing. 

Sensory appears intact.  Cerebellar, gait deferred.



IMPRESSION:  Intermittent confusional state superimposed on dementia, and

workup in progress, CAT scan of the head was negative, and continue present

management.  We will follow up.





__________________________________________

Korey Valera MD



DD:  04/11/2018 17:48:16

DT:  04/11/2018 19:23:41

Job # [id number]

## 2018-04-11 NOTE — RAD
HISTORY:

Altered mental status. 



COMPARISON:

07/26/2017 



FINDINGS:



LUNGS:

No active pulmonary disease.



PLEURA:

No significant pleural effusion identified, no pneumothorax apparent.



CARDIOVASCULAR:

No radiographic findings to suggest acute or significant 

cardiovascular disease. Incidental Finding(s): Postoperative changes 

related to sternotomy. 



OSSEOUS STRUCTURES:

No significant abnormalities.



VISUALIZED UPPER ABDOMEN:

Normal.



OTHER FINDINGS:

Punctate calcifications overlying the left carrol thorax reside in the 

left chest wall, left breast, a finding confirmed on CT scan 

04/21/2017.



IMPRESSION:

No active disease. No significant interval change compared to the 

prior examination(s).

## 2018-04-12 VITALS
TEMPERATURE: 98 F | OXYGEN SATURATION: 97 % | HEART RATE: 60 BPM | DIASTOLIC BLOOD PRESSURE: 62 MMHG | RESPIRATION RATE: 18 BRPM | SYSTOLIC BLOOD PRESSURE: 122 MMHG

## 2018-04-12 RX ADMIN — PANTOPRAZOLE SODIUM SCH MG: 40 TABLET, DELAYED RELEASE ORAL at 09:23

## 2018-04-12 RX ADMIN — INSULIN LISPRO SCH: 100 INJECTION, SOLUTION INTRAVENOUS; SUBCUTANEOUS at 11:58

## 2018-04-12 RX ADMIN — POLYETHYLENE GLYCOL 3350 SCH GM: 17 POWDER, FOR SOLUTION ORAL at 09:22

## 2018-04-12 RX ADMIN — INSULIN LISPRO SCH UNITS: 100 INJECTION, SOLUTION INTRAVENOUS; SUBCUTANEOUS at 08:29

## 2018-04-12 RX ADMIN — INSULIN LISPRO SCH UNITS: 100 INJECTION, SOLUTION INTRAVENOUS; SUBCUTANEOUS at 17:23

## 2018-04-12 NOTE — HP
CHIEF COMPLAINT:  Altered mental status with aggressiveness.



HISTORY OF PRESENT ILLNESS:  Ms. Nohelia Griffin is an 88-year-old female

with past medical history of hypertension, atrial fibrillation, COPD, CVA,

seizure, diabetes mellitus, breast cancer status post radical mastectomy,

dementia, noncompliant, multiple falls, came to the Emergency Department by

EMS for altered mental status.  As per EMS, they were notified by the

patient's  who states that the patient was calling him,

speaking tonight.  The patient denies any chest pain.  According to the

patient, her kitchen room water tap closet was open.  She was trying to

close.  She could not, then she closed, but still she want to make sure

that it is fine.  She called multiple times , but at the

time of examination, no abdominal pain, no vomiting or diarrhea. 

Discussion done with the patient's friend, Kira and the patient's friend,

Julianne.



PAST MEDICAL HISTORY:  As above, atrial fibrillation, on Coumadin, but very

noncompliant, INR is not within normal limit, COPD, cerebrovascular

accident, hard of hearing, diabetes mellitus type 2, breast cancer, status

post surgery and chemotherapy, right ankle fracture, fall, osteoarthritis,

poor appetite, hysterectomy.



FAMILY HISTORY:  Father and mother noncontributory.



HABITS:  Never smoked.  No drug, no ethanol.



ALLERGIES:  THE PATIENT IS ALLERGIC TO FISH AND SULFA.



REVIEW OF SYSTEMS:  The patient was seen and examined at the bedside,

looking comfortable.  No shortness of breath, no chest pain, no abdominal

pain, no diarrhea, no nausea or vomiting, no back pain, no neck pain, has

altered mental status.  Sometime, it looks like she is not on her baseline.



PHYSICAL EXAMINATION:

VITAL SIGNS:  Temperature 98.1, pulse 50, blood pressure 88/50, respiratory

rate 20.

HEENT:  Head:  Normocephalic and atraumatic.  Eyes:  PERRLA.  Extraocular

muscles intact.  Conjunctivae clear.  Nose, patent.  Mucous membrane moist.

NECK:  Supple.  No carotid bruit.  No JVD or thyromegaly.

CHEST:  Bilaterally symmetrical.

HEART:  S1 and S2 positive.

LUNGS:  Clear to auscultation.

ABDOMEN:  Soft.  Bowel sounds positive.  No organomegaly.

EXTREMITIES:  No edema, no cyanosis.

NEUROLOGIC:  Patient is awake, alert.  Moving all four extremities.  No

focal deficit.



LABORATORY DATA:  White blood cell 8.9, hemoglobin 12.7, hematocrit 37.5,

platelets 210.  Sodium 143, potassium 4, BUN 25, creatinine 0.7, glucose

39, now it is 193.  Liver function test within normal limits.



ASSESSMENT AND PLAN:  Ms. Nohelia Griffin is an 88-year-old lady with

uncontrolled diabetes mellitus, rule out hyperthyroidism, toxicology

negative, came with altered mental status, seen by Dr. Nancy Reilly. 

The patient has multiple medical problems, atrial fibrillation, on

Coumadin, but INR is not within normal limits.  It means, she is not taking

Coumadin regularly, noncompliant, urged  to be compliant, chronic

obstructive pulmonary disease, cerebrovascular accident, seizure,diabetes,

history of breast cancer, status post right radical mastectomy and

chemotherapy, history of multiple falls.  Actually, the patient is not able

to live alone by herself, but she is insisting that she wants to go her

home by herself, talked to her 2 friends, Kira and Julianne.  The patient has

power of  brother, but he lives far away and he is even older than

her.  The patient is noncompliant, not listening to anybody, but we will

put a consult with Dr. Nancy Reilly and neurologist, giving high dose

of Coumadin.  Repeat labs.  We will follow up.





__________________________________________

Jennifer Muller MD



DD:  04/11/2018 17:45:48

DT:  04/11/2018 19:56:24

Job # [id number]

## 2018-07-02 ENCOUNTER — HOSPITAL ENCOUNTER (OUTPATIENT)
Dept: HOSPITAL 42 - ED | Age: 83
Setting detail: OBSERVATION
LOS: 1 days | Discharge: HOME | End: 2018-07-03
Attending: INTERNAL MEDICINE | Admitting: INTERNAL MEDICINE
Payer: MEDICARE

## 2018-07-02 VITALS — BODY MASS INDEX: 0.1 KG/M2

## 2018-07-02 VITALS — HEART RATE: 64 BPM

## 2018-07-02 DIAGNOSIS — F03.90: ICD-10-CM

## 2018-07-02 DIAGNOSIS — I48.91: ICD-10-CM

## 2018-07-02 DIAGNOSIS — Z85.3: ICD-10-CM

## 2018-07-02 DIAGNOSIS — E78.00: ICD-10-CM

## 2018-07-02 DIAGNOSIS — Z79.01: ICD-10-CM

## 2018-07-02 DIAGNOSIS — Z88.2: ICD-10-CM

## 2018-07-02 DIAGNOSIS — H91.90: ICD-10-CM

## 2018-07-02 DIAGNOSIS — Z91.14: ICD-10-CM

## 2018-07-02 DIAGNOSIS — Z90.10: ICD-10-CM

## 2018-07-02 DIAGNOSIS — E11.649: Primary | ICD-10-CM

## 2018-07-02 DIAGNOSIS — I10: ICD-10-CM

## 2018-07-02 DIAGNOSIS — J44.9: ICD-10-CM

## 2018-07-02 LAB
ALBUMIN SERPL-MCNC: 4.2 G/DL (ref 3–4.8)
ALBUMIN/GLOB SERPL: 1.4 {RATIO} (ref 1.1–1.8)
ALT SERPL-CCNC: 26 U/L (ref 7–56)
APPEARANCE UR: CLEAR
APTT BLD: 37.8 SECONDS (ref 25.1–36.5)
AST SERPL-CCNC: 31 U/L (ref 14–36)
BACTERIA #/AREA URNS HPF: (no result) /[HPF]
BASOPHILS # BLD AUTO: 0.05 K/MM3 (ref 0–2)
BASOPHILS NFR BLD: 0.7 % (ref 0–3)
BILIRUB UR-MCNC: NEGATIVE MG/DL
BNP SERPL-MCNC: 1170 PG/ML (ref 0–450)
BUN SERPL-MCNC: 18 MG/DL (ref 7–21)
CALCIUM SERPL-MCNC: 9.1 MG/DL (ref 8.4–10.5)
COLOR UR: YELLOW
EOSINOPHIL # BLD: 0.2 10*3/UL (ref 0–0.7)
EOSINOPHIL NFR BLD: 2.6 % (ref 1.5–5)
ERYTHROCYTE [DISTWIDTH] IN BLOOD BY AUTOMATED COUNT: 12.5 % (ref 11.5–14.5)
GFR NON-AFRICAN AMERICAN: > 60
GLUCOSE UR STRIP-MCNC: 500 MG/DL
GRANULOCYTES # BLD: 4.52 10*3/UL (ref 1.4–6.5)
GRANULOCYTES NFR BLD: 65.9 % (ref 50–68)
HGB BLD-MCNC: 13.5 G/DL (ref 12–16)
INR PPP: 1.69 (ref 0.93–1.08)
LEUKOCYTE ESTERASE UR-ACNC: NEGATIVE LEU/UL
LYMPHOCYTES # BLD: 1.4 10*3/UL (ref 1.2–3.4)
LYMPHOCYTES NFR BLD AUTO: 20 % (ref 22–35)
MCH RBC QN AUTO: 30.6 PG (ref 25–35)
MCHC RBC AUTO-ENTMCNC: 34.3 G/DL (ref 31–37)
MCV RBC AUTO: 89.3 FL (ref 80–105)
MONOCYTES # BLD AUTO: 0.7 10*3/UL (ref 0.1–0.6)
MONOCYTES NFR BLD: 10.8 % (ref 1–6)
PH UR STRIP: 6 [PH] (ref 4.7–8)
PLATELET # BLD: 216 10^3/UL (ref 120–450)
PMV BLD AUTO: 10.1 FL (ref 7–11)
PROT UR STRIP-MCNC: (no result) MG/DL
PROTHROMBIN TIME: 19.7 SECONDS (ref 9.4–12.5)
RBC # BLD AUTO: 4.41 10^6/UL (ref 3.5–6.1)
RBC # UR STRIP: NEGATIVE /UL
RBC #/AREA URNS HPF: NEGATIVE /HPF (ref 0–2)
SP GR UR STRIP: 1.02 (ref 1–1.03)
TROPONIN I SERPL-MCNC: < 0.01 NG/ML
UROBILINOGEN UR STRIP-ACNC: 0.2 E.U./DL
WBC # BLD AUTO: 6.9 10^3/UL (ref 4.5–11)

## 2018-07-02 PROCEDURE — 85730 THROMBOPLASTIN TIME PARTIAL: CPT

## 2018-07-02 PROCEDURE — 93005 ELECTROCARDIOGRAM TRACING: CPT

## 2018-07-02 PROCEDURE — 83036 HEMOGLOBIN GLYCOSYLATED A1C: CPT

## 2018-07-02 PROCEDURE — 80061 LIPID PANEL: CPT

## 2018-07-02 PROCEDURE — 82550 ASSAY OF CK (CPK): CPT

## 2018-07-02 PROCEDURE — 81001 URINALYSIS AUTO W/SCOPE: CPT

## 2018-07-02 PROCEDURE — 82948 REAGENT STRIP/BLOOD GLUCOSE: CPT

## 2018-07-02 PROCEDURE — 99285 EMERGENCY DEPT VISIT HI MDM: CPT

## 2018-07-02 PROCEDURE — 96374 THER/PROPH/DIAG INJ IV PUSH: CPT

## 2018-07-02 PROCEDURE — 85610 PROTHROMBIN TIME: CPT

## 2018-07-02 PROCEDURE — 84484 ASSAY OF TROPONIN QUANT: CPT

## 2018-07-02 PROCEDURE — 97116 GAIT TRAINING THERAPY: CPT

## 2018-07-02 PROCEDURE — 83880 ASSAY OF NATRIURETIC PEPTIDE: CPT

## 2018-07-02 PROCEDURE — 83550 IRON BINDING TEST: CPT

## 2018-07-02 PROCEDURE — 71045 X-RAY EXAM CHEST 1 VIEW: CPT

## 2018-07-02 PROCEDURE — 87086 URINE CULTURE/COLONY COUNT: CPT

## 2018-07-02 PROCEDURE — 82607 VITAMIN B-12: CPT

## 2018-07-02 PROCEDURE — 83540 ASSAY OF IRON: CPT

## 2018-07-02 PROCEDURE — 97162 PT EVAL MOD COMPLEX 30 MIN: CPT

## 2018-07-02 PROCEDURE — 70450 CT HEAD/BRAIN W/O DYE: CPT

## 2018-07-02 PROCEDURE — 80053 COMPREHEN METABOLIC PANEL: CPT

## 2018-07-02 PROCEDURE — 85025 COMPLETE CBC W/AUTO DIFF WBC: CPT

## 2018-07-02 PROCEDURE — 83735 ASSAY OF MAGNESIUM: CPT

## 2018-07-02 PROCEDURE — 82746 ASSAY OF FOLIC ACID SERUM: CPT

## 2018-07-02 PROCEDURE — 36415 COLL VENOUS BLD VENIPUNCTURE: CPT

## 2018-07-02 PROCEDURE — 83615 LACTATE (LD) (LDH) ENZYME: CPT

## 2018-07-02 NOTE — CT
PROCEDURE:  CT HEAD WITHOUT CONTRAST.



HISTORY:

r/o ICH



COMPARISON:

Comparison made with prior CT scan brain dated 04/10/2018. 



TECHNIQUE:

Axial computed tomography images were obtained through the head/brain 

without intravenous contrast.  



Radiation dose:



Total exam DLP = 844.18 mGy-cm.



This CT exam was performed using one or more of the following dose 

reduction techniques: Automated exposure control, adjustment of the 

mA and/or kV according to patient size, and/or use of iterative 

reconstruction technique.



FINDINGS:



HEMORRHAGE:

No intracranial hemorrhage. 



BRAIN:

Significant diffuse and confluent chronic periventricular white 

matter ischemic changes are again seen extending peripherally into 

the deep and subcortical white matter both cerebral hemispheres. 

There is extension of these changes into the white matter tracts of 

both basal nuclei. Additionally, there also appear to be a few 

scattered chronic bilateral basal nuclei lacunar type infarcts. . 

Note that the possibility of a small hyperacute infarct cannot be 

excluded on this study and if there is any concern, consider followup 

MRI brain. 



Moderate generalized volume loss. 



Mild vascular calcifications both carotid siphons and vertebral 

arteries. 



VENTRICLES:

No obstructive hydrocephalus. 



CALVARIUM:

Calvarium intact



PARANASAL SINUSES:

Minimal mucosal thickening seen within a few ethmoid air cells.



MASTOID AIR CELLS:

Unremarkable as visualized. No inflammatory changes.



OTHER FINDINGS:

Orbits and contents unremarkable. 



IMPRESSION:

No acute intracranial hemorrhage. 



Moderate to significant chronic white matter ischemic changes with 

extension into the white matter tracts both basal ganglia.  There 

also appears to be a few scattered chronic bilateral basal nuclei 

lacunar type infarcts.



Moderate generalized volume loss.

## 2018-07-02 NOTE — ED PDOC
Arrival/HPI





- General


Chief Complaint: Altered Mental Status


Time Seen by Provider: 07/02/18 12:20


Historian: Patient, Caregiver, EMS





- History of Present Illness


Narrative History of Present Illness (Text): 


07/02/18 13:12


A 88 year old female brought into the emergency department by EMS for 

evaluation. As per caregiver, patient was found to be more lethargic at home. 

On evaluation, patient reports she has not drank or eaten anything today. 

Patient notes mild neck discomfort but denies any fever, chills, nausea, 

vomiting, abdominal pain, chest pain, shortness of breath or any other 

complaints.





PMD: Dr. Muller





Time/Duration: Prior to Arrival


Symptom Course: Improving


Context: Home





Past Medical History





- Provider Review


Nursing Documentation Reviewed: Yes





- Infectious Disease


Hx of Infectious Diseases: None





- Tetanus Immunization


Tetanus Immunization: Unknown





- Cardiac


Hx Cardiac Disorders: Yes (Afib (on Coumadin))


Hx Hypertension: Yes





- Pulmonary


Hx Chronic Obstructive Pulmonary Disease (COPD): Yes





- Neurological


HX Cerebrovascular Accident: Yes





- HEENT


Hx HEENT Disorder: Yes (Hard of hearing)





- Renal


Hx Renal Disorder: No





- Endocrine/Metabolic


Hx Diabetes Mellitus Type 2: Yes





- Hematological/Oncological


Hx Cancer: Yes (Breast CA)


Hx Chemotherapy: Yes





- Integumentary


Hx Dermatological Disorder: No





- Musculoskeletal/Rheumatological


Hx Falls: Yes


Hx Fractures: Yes (Right ankle)


Hx Osteoarthritis: Yes





- Gastrointestinal


Hx Gastrointestinal Disorders: Yes (POOR APPETTITE,)





- Genitourinary/Gynecological


Hx Urinary Tract Infection: Yes





- Psychiatric


Hx Psychophysiologic Disorder: No


Hx Substance Use: No





- Surgical History


Hx Mastectomy: Yes





- Anesthesia


Hx Anesthesia: Yes


Hx Anesthesia Reactions: No


Hx Malignant Hyperthermia: No





- Suicidal Assessment


Feels Threatened In Home Enviroment: No





Family/Social History





- Physician Review


Nursing Documentation Reviewed: Yes


Family/Social History: No Known Family HX


Smoking Status: Never Smoked


Hx Alcohol Use: No


Hx Substance Use: No


Hx Substance Use Treatment: No





Allergies/Home Meds


Allergies/Adverse Reactions: 


Allergies





FISH Allergy (Verified 07/26/17 17:07)


 NAUSEA


Sulfa (Sulfonamide Antibiotics) Allergy (Verified 07/26/17 17:07)


 HEADACHE











Review of Systems





- Physician Review


All systems were reviewed & negative as marked: Yes





- Review of Systems


Constitutional: Other (lethargy).  absent: Fevers, Night Sweats


Respiratory: absent: SOB


Cardiovascular: absent: Chest Pain


Gastrointestinal: absent: Abdominal Pain, Nausea, Vomiting


Musculoskeletal: Neck Pain





Physical Exam


Vital Signs Reviewed: Yes


Vital Signs











  Temp Pulse Resp BP Pulse Ox


 


 07/02/18 14:00   76  18  155/78 H  98


 


 07/02/18 12:32  97.6 F  71  18  175/77 H  97











Temperature: Afebrile


Blood Pressure: Hypertensive


Pulse: Regular


Respiratory Rate: Normal


Appearance: Positive for: Well-Appearing, Non-Toxic, Comfortable


Pain Distress: None


Mental Status: Positive for: Alert and Oriented X 3





- Systems Exam


Head: Present: Atraumatic, Normocephalic


Pupils: Present: PERRL


Extroacular Muscles: Present: EOMI


Conjunctiva: Present: Normal


Mouth: Present: Dry


Neck: Present: Normal Range of Motion


Respiratory/Chest: Present: Clear to Auscultation, Good Air Exchange.  No: 

Respiratory Distress, Accessory Muscle Use


Cardiovascular: Present: Regular Rate and Rhythm, Normal S1, S2.  No: Murmurs


Abdomen: Present: Normal Bowel Sounds.  No: Tenderness, Distention, Peritoneal 

Signs


Breast/Axillary: Present: Other (bilateral mastectomy)


Back: Present: Normal Inspection


Upper Extremity: Present: Normal Inspection.  No: Cyanosis, Edema


Lower Extremity: Present: Normal Inspection.  No: Edema


Neurological: Present: GCS=15, CN II-XII Intact, Speech Normal


Skin: Present: Warm, Dry, Normal Color.  No: Rashes


Psychiatric: Present: Alert, Oriented x 3, Normal Insight, Normal Concentration





Medical Decision Making


ED Course and Treatment: 


07/02/18 13:12


Impression:


A 88 year old female with increased lethargy





Plan:


-- Head CT


-- Chest xray


-- EKG


-- Labs


-- Urine culture


-- Urinalysis


-- Dextrose


-- Reassess and disposition





Progress Notes:


EKG shows NSR at 80 BPM with 1st degree AV block, LAD. Interpreted by me.





Report Date : 07/02/2018 13:00:19


PROCEDURE:  CT HEAD WITHOUT CONTRAST.


Dictator : Rigoberto Myers MD


IMPRESSION:


No acute intracranial hemorrhage. 


Moderate to significant chronic white matter ischemic changes with extension 

into the white matter tracts both basal ganglia.  There also appears to be a 

few scattered chronic bilateral basal nuclei lacunar type infarcts.


Moderate generalized volume loss.





Report Date : 07/02/2018 13:47:01


Procedure: Chest xray


Dictator : Rigoberto Myers MD


IMPRESSION:


There appears to be some mild left basilar atelectasis. Again noted though less 

well seen are calcifications of the trachea, lobar, segmental and proximal 

subsegmental bronchi.




















- Lab Interpretations


Lab Results: 








 07/02/18 12:30 





 07/02/18 12:30 





 Lab Results





07/02/18 15:01: Urine Color Yellow, Urine Appearance Clear, Urine pH 6.0, Ur 

Specific Gravity 1.025, Urine Protein Trace H, Urine Glucose (UA) 500 H, Urine 

Ketones 15 H, Urine Blood Negative, Urine Nitrate Negative, Urine Bilirubin 

Negative, Urine Urobilinogen 0.2, Ur Leukocyte Esterase Negative, Urine RBC 

Negative, Urine WBC 1 - 3, Ur Epithelial Cells 3 - 4, Urine Bacteria Few


07/02/18 12:30: Sodium 143, Potassium 4.1, Chloride 105, Carbon Dioxide 26, 

Anion Gap 16, BUN 18, Creatinine 0.5 L, Est GFR ( Amer) > 60, Est GFR (

Non-Af Amer) > 60, Random Glucose 42 L* D, Calcium 9.1, Magnesium 1.8, Total 

Bilirubin 0.6, AST 31, ALT 26, Alkaline Phosphatase 58, Lactate Dehydrogenase 

670, Total Creatine Kinase 45, Troponin I < 0.01, NT-Pro-B Natriuret Pep 1170 H

, Total Protein 7.2, Albumin 4.2, Globulin 3.0, Albumin/Globulin Ratio 1.4


07/02/18 12:30: PT 19.7 H, INR 1.69 H, APTT 37.8 H


07/02/18 12:30: WBC 6.9  D, RBC 4.41, Hgb 13.5, Hct 39.4, MCV 89.3, MCH 30.6, 

MCHC 34.3, RDW 12.5, Plt Count 216, MPV 10.1, Gran % 65.9, Lymph % (Auto) 20.0 L

, Mono % (Auto) 10.8 H, Eos % (Auto) 2.6, Baso % (Auto) 0.7, Gran # 4.52, Lymph 

# (Auto) 1.4, Mono # (Auto) 0.7 H, Eos # (Auto) 0.2, Baso # (Auto) 0.05











- RAD Interpretation


Radiology Orders: 








07/02/18 12:27


CHEST PORTABLE [RAD] Stat 





07/02/18 12:28


HEAD W/O CONTRAST [CT] Stat 














- Medication Orders


Current Medication Orders: 











Discontinued Medications





Dextrose (Dextrose 50% Inj)  50 ml IVP STAT STA


   Stop: 07/02/18 12:29


   Last Admin: 07/02/18 12:35  Dose: 50 ml





IVP Administration


 Document     07/02/18 12:35  EQ  (Rec: 07/02/18 12:35  EQ  [id number])


     Charges for Administration


      # of IVP Administrations                   1





Dextrose (Dextrose 50% Inj)  50 ml IVP STAT STA


   Stop: 07/02/18 12:29


   Last Admin: 07/02/18 12:35  Dose: 50 ml





IVP Administration


 Document     07/02/18 12:35  EQ  (Rec: 07/02/18 12:35  EQ  [id number])


     Charges for Administration


      # of IVP Administrations                   1














- Scribe Statement


The provider has reviewed the documentation as recorded by the Kareem Marquez





Provider Scribe Attestation:


All medical record entries made by the Scribe were at my direction and 

personally dictated by me. I have reviewed the chart and agree that the record 

accurately reflects my personal performance of the history, physical exam, 

medical decision making, and the department course for this patient. I have 

also personally directed, reviewed, and agree with the discharge instructions 

and disposition.








Disposition/Present on Arrival





- Present on Arrival


Any Indicators Present on Arrival: No


History of DVT/PE: No


History of Uncontrolled Diabetes: No


Urinary Catheter: No


History of Decub. Ulcer: No


History Surgical Site Infection Following: None





- Disposition


Have Diagnosis and Disposition been Completed?: Yes


Diagnosis: 


 Hypoglycemia associated with diabetes





Disposition: HOSPITALIZED


Disposition Time: 15:00


Condition: STABLE

## 2018-07-02 NOTE — CARD
--------------- APPROVED REPORT --------------





EKG Measurement

Heart [id number]

AL [id number]

[id number]

[id number]

[id number]



<Conclusion>

Sinus rhythm with 1st degree AV block

Left axis deviation

ST & T wave abnormality c/w ischemia

Prolonged QT

## 2018-07-02 NOTE — RAD
HISTORY:

r/o infiltrate  



COMPARISON:

No prior. 



FINDINGS:



LUNGS:

There appears to be some mild left basilar atelectasis. Again noted 

though less well seen are calcifications of the trachea, lobar, 

segmental and proximal subsegmental bronchi. 



PLEURA:

No significant pleural effusion identified, no pneumothorax apparent.



CARDIOVASCULAR:

Sternotomy wires. Heart appears borderline enlarged.



OSSEOUS STRUCTURES:

No significant abnormalities.



VISUALIZED UPPER ABDOMEN:

Normal.



OTHER FINDINGS:

Calcifications left breast with apparent right mastectomy. .  

Questionable right mastectomy. With



IMPRESSION:

There appears to be some mild left basilar atelectasis. Again noted 

though less well seen are calcifications of the trachea, lobar, 

segmental and proximal subsegmental bronchi.

## 2018-07-03 VITALS — DIASTOLIC BLOOD PRESSURE: 75 MMHG | SYSTOLIC BLOOD PRESSURE: 147 MMHG

## 2018-07-03 VITALS — OXYGEN SATURATION: 93 % | RESPIRATION RATE: 20 BRPM | TEMPERATURE: 97.4 F

## 2018-07-03 VITALS — HEART RATE: 80 BPM

## 2018-07-03 LAB
% IRON SATURATION: 19 % (ref 20–55)
FOLATE SERPL-MCNC: 10 NG/ML
HDLC SERPL-MCNC: 39 MG/DL (ref 29–60)
INR PPP: 1.69 (ref 0.93–1.08)
IRON SERPL-MCNC: 55 UG/DL (ref 45–180)
LDLC SERPL-MCNC: 94 MG/DL (ref 0–129)
PROTHROMBIN TIME: 19.7 SECONDS (ref 9.4–12.5)
TIBC SERPL-MCNC: 285 UG/DL (ref 265–497)
VIT B12 SERPL-MCNC: 382 PG/ML (ref 239–931)

## 2018-07-03 NOTE — CP.PCM.PCO
Physician Communication Note





- Physician Communication Note


Physician Communication Note: ams sec to transient hypoglycemia. c/w to monitor 

BS.

## 2018-07-03 NOTE — HP
LOCATION:  The patient was seen and examined on the bedside in her room on

07/02/2018.



CHIEF COMPLAINTS:  Altered mental status.



HISTORY OF PRESENT ILLNESS:  Ms. Nohelia Castañeda, 88-year-old female with

past medical history of atrial fibrillation, dementia, hypertension,

hypercholesterolemia.  Brought to the emergency department by EMS for

evaluation.  As per caregiver, the patient was found to be more lethargic

at home.  On evaluation, the patient reports she has not drank or eaten

anything today.  The patient noticed mild discomfort, but denies any fever,

chills.  No nausea, vomiting, diarrhea.  No hematuria or hematochezia.  No

swelling of the legs.  No chest pain.  No palpitation.  No shortness of

breath.



PAST MEDICAL HISTORY:  Atrial fibrillation, on Coumadin; hypertension;

COPD; hard of hearing, diabetes mellitus; history of breast cancer; right

ankle fracture; poor appetite; history of mastectomy.



FAMILY HISTORY:  Father and mother, noncontributory.



HABITS:  Never smoked.  No drugs.  No ethanol.



ALLERGIES:  THE PATIENT IS ALLERGIC WITH _____ AND SULFA.



REVIEW OF SYSTEMS:  The patient was seen and examined on the bedside in her

room, looking comfortable that moment.  No shortness of breath.  No chest

pain.  No abdominal pain.  No nausea, vomiting, diarrhea.  No fever.  No

chills, but was feeling lethargic.



PHYSICAL EXAMINATION:

VITAL SIGNS:  Temperature 97.6, pulse 71, respiratory rate 18, blood

pressure 175/77, pulse oximetry 97.

HEENT:  Head normocephalic, atraumatic.  Eyes PERRLA.  Extraocular muscles

intact.  Conjunctivae clear.  Nose patent.  Mucous membrane moist.

NECK:  Supple.  No carotid bruit.  No JVD or thyromegaly.

CHEST:  Bilaterally symmetrical.

HEART:  S1 and S2 positive.

LUNGS:  Clear to auscultation.

ABDOMEN:  Soft.  Bowel sounds positive.  No organomegaly.

EXTREMITIES:  No edema.  No cyanosis.

NEUROLOGICAL:  The patient is awake and alert.  Moving all 4 extremities. 

No focal deficits.



LABORATORY DATA:  White blood cells 6.9, hemoglobin 13.5, hematocrit 39.4,

platelets 216.  Sodium 143, potassium 4.1, BUN 18, creatinine 0.5, glucose

42.



ASSESSMENT AND PLAN:  Ms. Nohelia Castañeda, 88-year-old lady with hypoglycemia

associated with diabetes, altered mental status, was found on the floor,

history of atrial fibrillation, getting Coumadin, chronic obstructive

pulmonary disease, hard of hearing, diabetes mellitus type 2, history of

breast cancer, history of mastectomy, poor appetite, had multiple

admissions, noncompliant with her medications and office visits.  Lives

alone.  Multiple time tried to send the patient to assisted living, but

still she wants to live alone by herself. Discussion done with the nursing

staff.  INR is 1.69, getting Coumadin, we will continue that.  We will

follow up.





__________________________________________

Jennifer Muller MD





DD:  07/03/2018 0:23:17

DT:  07/03/2018 2:25:00

Job # [id number]

## 2018-07-04 NOTE — CON
DATE:  07/03/2018



HISTORY OF PRESENT ILLNESS:  This is an 88-year-old female with past

medical history of dementia, hypertension, atrial fibrillation, high

cholesterol, came to the emergency room _____ being more lethargic, and

called to evaluate the patient.



PAST MEDICAL HISTORY:  Hypertension, COPD, diabetes and atrial

fibrillation.



ALLERGIES:  ALLERGIC TO SULFA.



REVIEW OF SYSTEMS:  Ten-point review of systems was negative except altered

mental status.



PHYSICAL EXAMINATION

VITAL SIGNS:  Blood pressure 175/77.

HEENT:  Normocephalic, atraumatic.

NECK:  Supple.

NEUROLOGIC:  Awake and oriented to self, a little bit confused at times,

and follows simple commands.  No facial asymmetry.  Tongue midline.  Motor

examination:  Spontaneous movement of the extremities noted.  Deep tendon

reflexes 1+.  Plantars are downgoing.  Sensory appears intact.  Cerebellar,

gait deferred.



IMPRESSION:  An 88-year-old white female with past medical history of

diabetes, admitted with altered mental status secondary to hypoglycemia,

was found on the floor.  The patient is doing better, and continue present

medication.  We will follow up.





__________________________________________

Korey Valera MD



DD:  07/03/2018 12:19:16

DT:  07/03/2018 18:14:14

Job # [id number]

## 2018-10-17 ENCOUNTER — HOSPITAL ENCOUNTER (INPATIENT)
Dept: HOSPITAL 42 - ED | Age: 83
LOS: 8 days | Discharge: ANOTHER HOSPITAL | DRG: 309 | End: 2018-10-25
Attending: INTERNAL MEDICINE | Admitting: INTERNAL MEDICINE
Payer: MEDICARE

## 2018-10-17 VITALS — BODY MASS INDEX: 18.2 KG/M2

## 2018-10-17 DIAGNOSIS — I47.1: ICD-10-CM

## 2018-10-17 DIAGNOSIS — E78.00: ICD-10-CM

## 2018-10-17 DIAGNOSIS — I48.0: ICD-10-CM

## 2018-10-17 DIAGNOSIS — Z85.3: ICD-10-CM

## 2018-10-17 DIAGNOSIS — Z79.01: ICD-10-CM

## 2018-10-17 DIAGNOSIS — I45.9: ICD-10-CM

## 2018-10-17 DIAGNOSIS — Z87.440: ICD-10-CM

## 2018-10-17 DIAGNOSIS — Z91.013: ICD-10-CM

## 2018-10-17 DIAGNOSIS — E11.65: ICD-10-CM

## 2018-10-17 DIAGNOSIS — Z90.10: ICD-10-CM

## 2018-10-17 DIAGNOSIS — I10: ICD-10-CM

## 2018-10-17 DIAGNOSIS — I25.10: ICD-10-CM

## 2018-10-17 DIAGNOSIS — G40.909: ICD-10-CM

## 2018-10-17 DIAGNOSIS — Z88.2: ICD-10-CM

## 2018-10-17 DIAGNOSIS — Z95.2: ICD-10-CM

## 2018-10-17 DIAGNOSIS — I49.5: Primary | ICD-10-CM

## 2018-10-17 DIAGNOSIS — Z86.73: ICD-10-CM

## 2018-10-17 DIAGNOSIS — G45.9: ICD-10-CM

## 2018-10-17 DIAGNOSIS — I35.0: ICD-10-CM

## 2018-10-17 DIAGNOSIS — Z91.81: ICD-10-CM

## 2018-10-17 DIAGNOSIS — R29.6: ICD-10-CM

## 2018-10-17 DIAGNOSIS — F03.90: ICD-10-CM

## 2018-10-17 DIAGNOSIS — H91.90: ICD-10-CM

## 2018-10-17 DIAGNOSIS — R47.01: ICD-10-CM

## 2018-10-17 DIAGNOSIS — J44.9: ICD-10-CM

## 2018-10-17 DIAGNOSIS — Z95.1: ICD-10-CM

## 2018-10-17 DIAGNOSIS — E78.5: ICD-10-CM

## 2018-10-17 DIAGNOSIS — Z91.14: ICD-10-CM

## 2018-10-17 LAB
ALBUMIN SERPL-MCNC: 4.2 G/DL (ref 3–4.8)
ALBUMIN/GLOB SERPL: 1.3 {RATIO} (ref 1.1–1.8)
ALT SERPL-CCNC: 29 U/L (ref 7–56)
APPEARANCE UR: CLEAR
APTT BLD: 38.5 SECONDS (ref 25.1–36.5)
AST SERPL-CCNC: 40 U/L (ref 14–36)
BASOPHILS # BLD AUTO: 0.07 K/MM3 (ref 0–2)
BASOPHILS NFR BLD: 0.9 % (ref 0–3)
BILIRUB UR-MCNC: NEGATIVE MG/DL
BUN SERPL-MCNC: 18 MG/DL (ref 7–21)
CALCIUM SERPL-MCNC: 9.1 MG/DL (ref 8.4–10.5)
COLOR UR: (no result)
EOSINOPHIL # BLD: 0.2 10*3/UL (ref 0–0.7)
EOSINOPHIL NFR BLD: 2.2 % (ref 1.5–5)
ERYTHROCYTE [DISTWIDTH] IN BLOOD BY AUTOMATED COUNT: 12.3 % (ref 11.5–14.5)
GFR NON-AFRICAN AMERICAN: > 60
GLUCOSE UR STRIP-MCNC: NEGATIVE MG/DL
GRANULOCYTES # BLD: 4.78 10*3/UL (ref 1.4–6.5)
GRANULOCYTES NFR BLD: 62.8 % (ref 50–68)
HDLC SERPL-MCNC: 37 MG/DL (ref 29–60)
HGB BLD-MCNC: 12.9 G/DL (ref 12–16)
INR PPP: 1.85
LDLC SERPL-MCNC: 120 MG/DL (ref 0–129)
LEUKOCYTE ESTERASE UR-ACNC: NEGATIVE LEU/UL
LYMPHOCYTES # BLD: 1.9 10*3/UL (ref 1.2–3.4)
LYMPHOCYTES NFR BLD AUTO: 24.9 % (ref 22–35)
MCH RBC QN AUTO: 29.9 PG (ref 25–35)
MCHC RBC AUTO-ENTMCNC: 33.5 G/DL (ref 31–37)
MCV RBC AUTO: 89.3 FL (ref 80–105)
MONOCYTES # BLD AUTO: 0.7 10*3/UL (ref 0.1–0.6)
MONOCYTES NFR BLD: 9.2 % (ref 1–6)
PH UR STRIP: 7 [PH] (ref 4.7–8)
PLATELET # BLD: 201 10^3/UL (ref 120–450)
PMV BLD AUTO: 9.8 FL (ref 7–11)
PROT UR STRIP-MCNC: NEGATIVE MG/DL
PROTHROMBIN TIME: 21.3 SECONDS (ref 9.4–12.5)
RBC # BLD AUTO: 4.31 10^6/UL (ref 3.5–6.1)
RBC # UR STRIP: NEGATIVE /UL
SP GR UR STRIP: <= 1.005 (ref 1–1.03)
TROPONIN I SERPL-MCNC: 0.07 NG/ML
UROBILINOGEN UR STRIP-ACNC: 0.2 E.U./DL
WBC # BLD AUTO: 7.6 10^3/UL (ref 4.5–11)

## 2018-10-17 NOTE — CT
Date of service: 



10/17/2018



PROCEDURE:  CT HEAD WITHOUT CONTRAST.



HISTORY:

Code Stroke



COMPARISON:

07/02/2018



TECHNIQUE:

Axial computed tomography images were obtained through the head/brain 

without intravenous contrast.  



Supplemental Coronal and Sagittal projections created and reviewed.



Radiation dose:



Total exam DLP = 833.91 mGy-cm.



This CT exam was performed using one or more of the following dose 

reduction techniques: Automated exposure control, adjustment of the 

mA and/or kV according to patient size, and/or use of iterative 

reconstruction technique.



FINDINGS:



HEMORRHAGE:

No intracranial hemorrhage. 



BRAIN:

No mass effect or edema.  Cortical and cerebellar atrophy, 

periventricular small vessel disease.  Small basal ganglia infarct on 

the left unchanged.



VENTRICLES:

Unremarkable. No hydrocephalus. 



CALVARIUM:

Unremarkable.



PARANASAL SINUSES:

Unremarkable as visualized. No significant inflammatory changes.



MASTOID AIR CELLS:

Unremarkable as visualized. No inflammatory changes.



OTHER FINDINGS:

None.



IMPRESSION:

No acute intracranial abnormalities. No significant findings to 

account for the clinical presentation. No significant interval change 

compared to the prior examination(s).



__________________________________________



Code stroke protocol:



Study completed 16:18



Results conveyed verbally at 16:33 discussed findings directly with 

the attending in the emergency department Dr. Jeremy Haney.



Interpretation finalized and available for review 16:36

## 2018-10-17 NOTE — RAD
Date of service: 



10/17/2018



HISTORY:

 Code Stroke 



COMPARISON:

07/02/2018 



FINDINGS:



LUNGS:

The right lung is clear.  Redemonstration of ill-defined 

calcification in the left lower lobe which could be related to 

chronic aspiration or bronchial calcifications. 



PLEURA:

No pleural effusions or pneumothorax. 



CARDIOVASCULAR:

There is mild cardiomegaly.  Status post CABG.   Atherosclerotic 

aortic arch calcifications are present. 



OSSEOUS STRUCTURES:

Within normal limits for the patient's age.



VISUALIZED UPPER ABDOMEN:

Normal.



OTHER FINDINGS:

None.



IMPRESSION:

No acute findings.

## 2018-10-17 NOTE — CT
Date of service: 



10/17/2018



PROCEDURE:  CT Angiography of the neck with contrast



HISTORY:

code stroke



COMPARISON:

None.



TECHNIQUE:

Contiguous axial images of the neck were obtained from the level of 

the skull-base to the superior mediastinum in the arteriographic 

phase of enhancement. Coronal and sagittal reformats or also 

generated. 



IV contrast dose: 146 cc of Omni 350



Radiation dose:



Total exam DLP = 207.87 mGy-cm.



This CT exam was performed using one or more of the following dose 

reduction techniques: Automated exposure control, adjustment of the 

mA and/or kV according to patient size, and/or use of iterative 

reconstruction technique.



FINDINGS:



RIGHT CAROTID ARTERIES:

Common Carotid Artery: Normal.



Carotid Bifurcation: Normal.



Internal Carotid Artery:Tortuous.  No stenosis



External Carotid Artery (proximal branches): Normal.



LEFT CAROTID ARTERIES:

Common Carotid Artery: Normal.



Carotid Bifurcation: Normal.



Internal Carotid Artery:Tortuous.  No stenosis



External Carotid Artery (proximal branches): Normal.



VERTEBRAL ARTERIES:

Right Vertebral Artery: Normal.



Left Vertebral Artery: Normal.



OTHER FINDINGS:

None. No atherosclerotic calcification or mural plaque present.



IMPRESSION:

No significant stenosis



PROCEDURE:  CT Angiography of the Brain.



HISTORY:

code stroke



COMPARISON:

None available.



TECHNIQUE:

CT angiography of the intracranial arteries was performed. Coronal 

and sagittal maximum intensity projection reformated images were 

generated.



Radiation dose:



Total exam DLP = 207.87 mGy-cm.



This CT exam was performed using one or more of the following dose 

reduction techniques: Automated exposure control, adjustment of the 

mA and/or kV according to patient size, and/or use of iterative 

reconstruction technique.



FINDINGS:



INTERNAL CEREBRAL ARTERIES:

Unremarkable. The skull base, petrous, cavernous and supraclinoid 

segments are bilaterally widely patent. 



ANTERIOR CEREBRAL ARTERIES:

Unremarkable. A1 and A2 segments are widely patent. Smaller distal 

branches unremarkable, as visualized.



MIDDLE CEREBRAL ARTERIES:

Unremarkable. M1 and M2 segments are widely patent. Perisylvian 

branches grossly symmetric.



POSTERIOR CIRCULATION:

Basilar Artery: Unremarkable.



Distal Vertebral Arteries: Unremarkable.



Posterior Cerebral Arteries: Unremarkable.



Posterior Inferior Cerebellar Arteries: Unremarkable.



ANEURYSM/ VASCULAR MALFORMATIONS:

None.



OTHER FINDINGS:

None. 



IMPRESSION:

Unremarkable CT Angiography of the Brain.

## 2018-10-18 LAB
BUN SERPL-MCNC: 15 MG/DL (ref 7–21)
CALCIUM SERPL-MCNC: 8.8 MG/DL (ref 8.4–10.5)
ERYTHROCYTE [DISTWIDTH] IN BLOOD BY AUTOMATED COUNT: 12.2 % (ref 11.5–14.5)
FOLATE SERPL-MCNC: 11 NG/ML
GFR NON-AFRICAN AMERICAN: > 60
HDLC SERPL-MCNC: 33 MG/DL (ref 29–60)
HGB BLD-MCNC: 12.2 G/DL (ref 12–16)
INR PPP: 1.99
LDLC SERPL-MCNC: 103 MG/DL (ref 0–129)
MCH RBC QN AUTO: 29.7 PG (ref 25–35)
MCHC RBC AUTO-ENTMCNC: 33.4 G/DL (ref 31–37)
MCV RBC AUTO: 88.8 FL (ref 80–105)
PLATELET # BLD: 226 10^3/UL (ref 120–450)
PMV BLD AUTO: 9.8 FL (ref 7–11)
PROTHROMBIN TIME: 23.2 SECONDS (ref 9.4–12.5)
RBC # BLD AUTO: 4.11 10^6/UL (ref 3.5–6.1)
VIT B12 SERPL-MCNC: > 1000 PG/ML (ref 239–931)
WBC # BLD AUTO: 6.5 10^3/UL (ref 4.5–11)

## 2018-10-18 RX ADMIN — PANTOPRAZOLE SODIUM SCH: 40 TABLET, DELAYED RELEASE ORAL at 06:05

## 2018-10-18 RX ADMIN — POLYETHYLENE GLYCOL 3350 SCH GM: 17 POWDER, FOR SOLUTION ORAL at 10:53

## 2018-10-18 NOTE — CARD
--------------- APPROVED REPORT --------------





Date of service: 10/18/2018



EKG Measurement

Heart [id number]

AZ [id number]

[id number]

[id number]

[id number]



<Conclusion>

Sinus bradycardia with marked sinus arrhythmia with 1st degree AV 

block

Left anterior fascicular block

Left ventricular hypertrophy with repolarization abnormality

Cannot rule out Septal infarct, age undetermined

Abnormal ECG

## 2018-10-18 NOTE — CP.PCM.CON
History of Present Illness





- History of Present Illness


History of Present Illness: 





Neurology Consultation Note:





Mrs. Castañeda is an 89-year-old woman with a past medical history of 

hypertension, atrial fibrillation, COPD, CVA, seizures, diabetes, breast cancer 

s/p radical mastectomy, dementia, and multiple falls, who presented to the ED 

yesterday with an episode of expressive aphasia that resolved spontaneously.  S

he was not a candidate for IV tPA due to resolution of symptoms.  Currently, she

is asymptomatic. Today, there were a few reported pauses on telemetry lasting 

about 2.5-3 seconds. 





Review of Systems





- Constitutional


Constitutional: As Per HPI





- EENT


Eyes: absent: As Per HPI, Blind Spots, Blurred Vision, Change in Vision, 

Decreased Night Vision, Diplopia, Discharge, Dry Eye, Exophthalmos, Floaters, 

Irritation, Itchy Eyes, Loss of Peripheral Vision, Pain, Photophobia, Requires 

Corrective Lenses, Sees Flashes, Spots in Vision, Tunnel Vision, Other Visual 

Disturbances, Loss of Vision, Other


Ears: absent: As Per HPI, Decreased Hearing, Ear Discharge, Ear Pain, Tinnitus, 

Abnormal Hearing, Disequilibrium, Dizziness, Other


Nose/Mouth/Throat: absent: As Per HPI, Epistaxis, Nasal Congestion, Nasal 

Discharge, Nasal Obstruction, Nasal Trauma, Nose Pain, Post Nasal Drip, Sinus 

Pain, Sinus Pressure, Bleeding Gums, Change in Voice, Dental Pain, Dry Mouth, 

Dysphagia, Halitosis, Hoarsness, Lip Swelling, Mouth Lesions, Mouth Pain, 

Odynophagia, Sore Throat, Throat Swelling, Tongue Swelling, Facial Pain, Neck 

Pain, Neck Mass, Other





- Breasts


Breasts: absent: As Per HPI, Change in Shape, Mass, Pain, Nipple Discharge, 

Nipple Inversion, Skin Changes, Swelling, Other





- Cardiovascular


Cardiovascular: As Per HPI





- Respiratory


Respiratory: absent: As Per HPI, Cough, Dyspnea, Hemoptysis, Dyspnea on 

Exertion, Wheezing, Snoring, Stridor, Pain on Inspiration, Chest Congestion, 

Excessive Mucous Production, Change in Mucous Color, Pain with Coughing, Other





- Gastrointestinal


Gastrointestinal: absent: As Per HPI, Abdominal Pain, Belching, Bloating, Change

in Bowel Habits, Change in Stool Character, Coffee Ground Emesis, Constipation, 

Cramping, Diarrhea, Dyspepsia, Dysphagia, Early Satiety, Excessive Flatus, Fecal

Incontinence, Heartburn, Hematemesis, Hematochezia, Loose Stools, Melena, Naus

ea, Odynophagia, Temesmus, Vomiting, Other





- Genitourinary


Genitourinary: absent: As Per HPI, Change in Urinary Stream, Difficulty 

Urinating, Dysuria, Flank Pain, Hematuria, Pyuria, Nocturia, Urinary 

Incontinence, Urinary Frequency, Urinary Hesitance, Urinary Urgency, Voiding 

Freq/Small Amts, Freq UTI, Hx Renal/Bladder Calculi, Hx /Renal Surgery, 

Bladder Distension, Other





- Musculoskeletal


Musculoskeletal: absent: As Per HPI, Abnormal Gait, Arthralgias, Atrophy, Back 

Pain, Deformity, Joint Swelling, Limited Range of Motion, Loss of Height, Muscle

Cramps, Muscle Weakness, Myalgias, Neck Pain, Numbness, Radiating Pain into 

Limb, Stiffness, Tingling, Other





- Integumentary


Integumentary: absent: As Per HPI, Acne, Alopecia, Bleeding Lesions, Change in 

Hair, Change in Nails, Change in Pigmentation, Changing Lesions, Dry Skin, 

Erythema, Furuncle, Hirsutism, Lesions, New Lesions, Non-Healing Lesions, 

Photosensitivity, Pruritus, Rash, Skin Pain, Skin Ulcer, Sores, Striae, Swe

lling, Unusual Bruising, Wounds, Jaundice, Other





- Psychiatric


Psychiatric: absent: As Per HPI, Abnormal Sleep Pattern, Anhedonia, Anxiety, 

Auditory Hallucinations, Behavioral Changes, Change in Appetite, Change in 

Libido, Confusion, Depression, Difficulty Concentrating, Hallucinations, 

Homicidal Ideation, Hopelessness, Irritability, Memory Loss, Mood Swings, Panic 

Attacks, Paranoia, Suicidal Ideation, Visual Hallucinations, Tactile 

Hallucinations, Other





- Endocrine


Endocrine: absent: As Per HPI, Change in Body Appearance, Change in Libido, Cold

Intolorance, Deepening of Voice, Excessive Sweating, Fatigue, Flushing, Heat 

Intolorance, Increase in Ring/Shoe/Hat Size, Palpitations, Polydipsia, 

Polyphagia, Polyuria, Other





- Hematologic/Lymphatic


Hematologic: absent: As Per HPI, Easy Bleeding, Easy Bruising, Lymphadenopathy, 

Other





Past Patient History





- Infectious Disease


Hx of Infectious Diseases: None





- Tetanus Immunizations


Tetanus Immunization: Unknown





- Past Social History


Smoking Status: Never Smoked





- CARDIAC


Hx Cardiac Disorders: Yes (Afib (on Coumadin))


Hx Hypertension: Yes





- PULMONARY


Hx Chronic Obstructive Pulmonary Disease (COPD): Yes





- NEUROLOGICAL


HX Cerebrovascular Accident: Yes





- HEENT


Hx HEENT Problems: Yes (Hard of hearing)





- RENAL


Hx Chronic Kidney Disease: No





- ENDOCRINE/METABOLIC


Hx Diabetes Mellitus Type 2: Yes





- HEMATOLOGICAL/ONCOLOGICAL


Hx Blood Disorders: Yes


Hx Cancer: Yes (Breast CA)


Hx Chemotherapy: Yes





- INTEGUMENTARY


Hx Dermatological Problems: Yes


Other/Comment: RADICAL MASTECTOMY. LEFT CHEST WITH A THICKENED PLETHORA OF SKIN.

RIGHT CHEST WITH SCABBED REDDENED AREA.





- MUSCULOSKELETAL/RHEUMATOLOGICAL


Hx Musculoskeletal Disorders: Yes


Hx Falls: No


Hx Unsteady Gait: Yes (WALKER)





- GASTROINTESTINAL


Hx Gastrointestinal Disorders: Yes (CONSTIPATION)





- GENITOURINARY/GYNECOLOGICAL


Hx Genitourinary Disorders: Yes


Hx Incontinence: Yes


Hx Urinary Tract Infection: Yes





- PSYCHIATRIC


Hx Psychophysiologic Disorder: No


Hx Substance Use: No





- SURGICAL HISTORY


Hx Surgeries: Yes


Hx Mastectomy: Yes





- ANESTHESIA


Hx Anesthesia: Yes


Hx Anesthesia Reactions: No


Hx Malignant Hyperthermia: No





Meds


Allergies/Adverse Reactions: 


                                    Allergies











Allergy/AdvReac Type Severity Reaction Status Date / Time


 


FISH Allergy  NAUSEA Verified 10/17/18 22:04


 


Sulfa (Sulfonamide Allergy  HEADACHE Verified 10/17/18 22:04





Antibiotics)     














- Medications


Medications: 


                               Current Medications





Atorvastatin Calcium (Lipitor)  10 mg PO DIN JOSHUA


Donepezil HCl (Aricept)  5 mg PO HS JOSHUA


Hydralazine HCl (Apresoline)  10 mg IVP Q6 PRN


   PRN Reason: Hypertension


Sodium Chloride (Sodium Chloride 0.9%)  1,000 mls @ 40 mls/hr IV .Q24H Pending sale to [facility]


   Last Admin: 10/17/18 20:58 Dose:  40 mls/hr


Levetiracetam (Keppra)  500 mg PO BID Pending sale to [facility]


   Last Admin: 10/18/18 10:53 Dose:  500 mg


Losartan Potassium (Cozaar)  100 mg PO DAILY Pending sale to [facility]


   Last Admin: 10/18/18 10:51 Dose:  100 mg


Pantoprazole Sodium (Protonix Ec Tab)  40 mg PO 0600 Pending sale to [facility]


   Last Admin: 10/18/18 06:05 Dose:  Not Given


Polyethylene Glycol (Miralax)  17 gm PO DAILY Pending sale to [facility]


   Last Admin: 10/18/18 10:53 Dose:  17 gm


Risperidone (Risperdal Tab)  0.5 mg PO 1000,2200 JOSHUA; Protocol


Warfarin Sodium (Coumadin)  3 mg PO 1800 JOSHUA; Protocol











Physical Exam





- Constitutional


Appears: No Acute Distress, Cachectic





- Head Exam


Head Exam: ATRAUMATIC, NORMAL INSPECTION, NORMOCEPHALIC





- Eye Exam


Eye Exam: EOMI, Normal appearance, PERRL





- ENT Exam


ENT Exam: Mucous Membranes Moist, Normal Exam





- Neck Exam


Neck exam: Positive for: Normal Inspection





- Respiratory Exam


Respiratory Exam: Clear to Auscultation Bilateral, NORMAL BREATHING PATTERN





- Cardiovascular Exam


Cardiovascular Exam: REGULAR RHYTHM, +S1, +S2





- GI/Abdominal Exam


GI & Abdominal Exam: Normal Bowel Sounds, Soft.  absent: Tenderness





- Rectal Exam


Rectal Exam: Deferred





- Extremities Exam


Extremities exam: Positive for: normal inspection





- Back Exam


Back exam: NORMAL INSPECTION





- Neurological Exam


Neurological exam: Alert, CN II-XII Intact, Normal Gait, Oriented x3, Reflexes 

Normal


Additional comments: 





NIHSS = 0





- Psychiatric Exam


Psychiatric exam: Normal Affect, Normal Mood





- Skin


Skin Exam: Dry, Intact, Normal Color, Rash, Warm





Results





- Vital Signs


Recent Vital Signs: 


                                Last Vital Signs











Temp  97.4 F L  10/18/18 12:10


 


Pulse  65   10/18/18 12:10


 


Resp  18   10/18/18 12:10


 


BP  145/63   10/18/18 12:10


 


Pulse Ox  93 L  10/18/18 10:00














- Labs


Result Diagrams: 


                                 10/18/18 07:00





                                 10/18/18 07:00


Labs: 


                         Laboratory Results - last 24 hr











  10/17/18 10/17/18 10/17/18





  16:11 16:11 16:11


 


WBC  7.6  


 


RBC  4.31  


 


Hgb  12.9  


 


Hct  38.5  


 


MCV  89.3  


 


MCH  29.9  


 


MCHC  33.5  


 


RDW  12.3  


 


Plt Count  201  


 


MPV  9.8  


 


Gran %  62.8  


 


Lymph % (Auto)  24.9  


 


Mono % (Auto)  9.2 H  


 


Eos % (Auto)  2.2  


 


Baso % (Auto)  0.9  


 


Gran #  4.78  


 


Lymph # (Auto)  1.9  


 


Mono # (Auto)  0.7 H  


 


Eos # (Auto)  0.2  


 


Baso # (Auto)  0.07  


 


PT   21.3 H 


 


INR   1.85 


 


APTT   38.5 H 


 


Sodium    138


 


Potassium    4.5


 


Chloride    102


 


Carbon Dioxide    27


 


Anion Gap    14


 


BUN    18


 


Creatinine    0.6 L


 


Est GFR ( Amer)    > 60


 


Est GFR (Non-Af Amer)    > 60


 


POC Glucose (mg/dL)   


 


Random Glucose    135 H


 


Hemoglobin A1c   


 


Calcium    9.1


 


Total Bilirubin    0.5


 


AST    40 H D


 


ALT    29


 


Alkaline Phosphatase    61


 


Troponin I    0.07  D


 


Total Protein    7.4


 


Albumin    4.2


 


Globulin    3.3


 


Albumin/Globulin Ratio    1.3


 


Triglycerides    147


 


Cholesterol    186


 


LDL Cholesterol Direct    120


 


HDL Cholesterol    37


 


Vitamin B12   


 


Folate   


 


TSH 3rd Generation   


 


Urine Color   


 


Urine Appearance   


 


Urine pH   


 


Ur Specific Gravity   


 


Urine Protein   


 


Urine Glucose (UA)   


 


Urine Ketones   


 


Urine Blood   


 


Urine Nitrate   


 


Urine Bilirubin   


 


Urine Urobilinogen   


 


Ur Leukocyte Esterase   


 


Blood Type   


 


Antibody Screen   


 


BBK History Checked   














  10/17/18 10/17/18 10/17/18





  16:11 17:50 21:10


 


WBC   


 


RBC   


 


Hgb   


 


Hct   


 


MCV   


 


MCH   


 


MCHC   


 


RDW   


 


Plt Count   


 


MPV   


 


Gran %   


 


Lymph % (Auto)   


 


Mono % (Auto)   


 


Eos % (Auto)   


 


Baso % (Auto)   


 


Gran #   


 


Lymph # (Auto)   


 


Mono # (Auto)   


 


Eos # (Auto)   


 


Baso # (Auto)   


 


PT   


 


INR   


 


APTT   


 


Sodium   


 


Potassium   


 


Chloride   


 


Carbon Dioxide   


 


Anion Gap   


 


BUN   


 


Creatinine   


 


Est GFR ( Amer)   


 


Est GFR (Non-Af Amer)   


 


POC Glucose (mg/dL)    94


 


Random Glucose   


 


Hemoglobin A1c  5.5  


 


Calcium   


 


Total Bilirubin   


 


AST   


 


ALT   


 


Alkaline Phosphatase   


 


Troponin I   


 


Total Protein   


 


Albumin   


 


Globulin   


 


Albumin/Globulin Ratio   


 


Triglycerides   


 


Cholesterol   


 


LDL Cholesterol Direct   


 


HDL Cholesterol   


 


Vitamin B12   


 


Folate   


 


TSH 3rd Generation   


 


Urine Color   Light yellow 


 


Urine Appearance   Clear 


 


Urine pH   7.0 


 


Ur Specific Gravity   <= 1.005 


 


Urine Protein   Negative 


 


Urine Glucose (UA)   Negative 


 


Urine Ketones   Negative 


 


Urine Blood   Negative 


 


Urine Nitrate   Negative 


 


Urine Bilirubin   Negative 


 


Urine Urobilinogen   0.2 


 


Ur Leukocyte Esterase   Negative 


 


Blood Type   


 


Antibody Screen   


 


BBK History Checked   














  10/18/18 10/18/18 10/18/18





  07:00 07:00 07:00


 


WBC   


 


RBC   


 


Hgb   


 


Hct   


 


MCV   


 


MCH   


 


MCHC   


 


RDW   


 


Plt Count   


 


MPV   


 


Gran %   


 


Lymph % (Auto)   


 


Mono % (Auto)   


 


Eos % (Auto)   


 


Baso % (Auto)   


 


Gran #   


 


Lymph # (Auto)   


 


Mono # (Auto)   


 


Eos # (Auto)   


 


Baso # (Auto)   


 


PT    23.2 H


 


INR    1.99


 


APTT   


 


Sodium   139 


 


Potassium   3.8 


 


Chloride   102 


 


Carbon Dioxide   30 


 


Anion Gap   10 


 


BUN   15 


 


Creatinine   0.6 L 


 


Est GFR ( Amer)   > 60 


 


Est GFR (Non-Af Amer)   > 60 


 


POC Glucose (mg/dL)   


 


Random Glucose   81 


 


Hemoglobin A1c  5.4  


 


Calcium   8.8 


 


Total Bilirubin   


 


AST   


 


ALT   


 


Alkaline Phosphatase   


 


Troponin I   


 


Total Protein   


 


Albumin   


 


Globulin   


 


Albumin/Globulin Ratio   


 


Triglycerides   121 


 


Cholesterol   170 


 


LDL Cholesterol Direct   103 


 


HDL Cholesterol   33 


 


Vitamin B12   > 1000 H 


 


Folate   11.0 


 


TSH 3rd Generation   


 


Urine Color   


 


Urine Appearance   


 


Urine pH   


 


Ur Specific Gravity   


 


Urine Protein   


 


Urine Glucose (UA)   


 


Urine Ketones   


 


Urine Blood   


 


Urine Nitrate   


 


Urine Bilirubin   


 


Urine Urobilinogen   


 


Ur Leukocyte Esterase   


 


Blood Type   


 


Antibody Screen   


 


BBK History Checked   














  10/18/18 10/18/18 10/18/18





  07:00 07:00 07:12


 


WBC  6.5  


 


RBC  4.11  


 


Hgb  12.2  


 


Hct  36.5  


 


MCV  88.8  


 


MCH  29.7  


 


MCHC  33.4  


 


RDW  12.2  


 


Plt Count  226  


 


MPV  9.8  


 


Gran %   


 


Lymph % (Auto)   


 


Mono % (Auto)   


 


Eos % (Auto)   


 


Baso % (Auto)   


 


Gran #   


 


Lymph # (Auto)   


 


Mono # (Auto)   


 


Eos # (Auto)   


 


Baso # (Auto)   


 


PT   


 


INR   


 


APTT   


 


Sodium   


 


Potassium   


 


Chloride   


 


Carbon Dioxide   


 


Anion Gap   


 


BUN   


 


Creatinine   


 


Est GFR ( Amer)   


 


Est GFR (Non-Af Amer)   


 


POC Glucose (mg/dL)    73


 


Random Glucose   


 


Hemoglobin A1c   


 


Calcium   


 


Total Bilirubin   


 


AST   


 


ALT   


 


Alkaline Phosphatase   


 


Troponin I   


 


Total Protein   


 


Albumin   


 


Globulin   


 


Albumin/Globulin Ratio   


 


Triglycerides   


 


Cholesterol   


 


LDL Cholesterol Direct   


 


HDL Cholesterol   


 


Vitamin B12   


 


Folate   


 


TSH 3rd Generation   0.65 


 


Urine Color   


 


Urine Appearance   


 


Urine pH   


 


Ur Specific Gravity   


 


Urine Protein   


 


Urine Glucose (UA)   


 


Urine Ketones   


 


Urine Blood   


 


Urine Nitrate   


 


Urine Bilirubin   


 


Urine Urobilinogen   


 


Ur Leukocyte Esterase   


 


Blood Type   


 


Antibody Screen   


 


BBK History Checked   














  10/18/18 10/18/18





  08:50 09:33


 


WBC  


 


RBC  


 


Hgb  


 


Hct  


 


MCV  


 


MCH  


 


MCHC  


 


RDW  


 


Plt Count  


 


MPV  


 


Gran %  


 


Lymph % (Auto)  


 


Mono % (Auto)  


 


Eos % (Auto)  


 


Baso % (Auto)  


 


Gran #  


 


Lymph # (Auto)  


 


Mono # (Auto)  


 


Eos # (Auto)  


 


Baso # (Auto)  


 


PT  


 


INR  


 


APTT  


 


Sodium  


 


Potassium  


 


Chloride  


 


Carbon Dioxide  


 


Anion Gap  


 


BUN  


 


Creatinine  


 


Est GFR ( Amer)  


 


Est GFR (Non-Af Amer)  


 


POC Glucose (mg/dL)   69


 


Random Glucose  


 


Hemoglobin A1c  


 


Calcium  


 


Total Bilirubin  


 


AST  


 


ALT  


 


Alkaline Phosphatase  


 


Troponin I  


 


Total Protein  


 


Albumin  


 


Globulin  


 


Albumin/Globulin Ratio  


 


Triglycerides  


 


Cholesterol  


 


LDL Cholesterol Direct  


 


HDL Cholesterol  


 


Vitamin B12  


 


Folate  


 


TSH 3rd Generation  


 


Urine Color  


 


Urine Appearance  


 


Urine pH  


 


Ur Specific Gravity  


 


Urine Protein  


 


Urine Glucose (UA)  


 


Urine Ketones  


 


Urine Blood  


 


Urine Nitrate  


 


Urine Bilirubin  


 


Urine Urobilinogen  


 


Ur Leukocyte Esterase  


 


Blood Type  A POSITIVE 


 


Antibody Screen  Negative 


 


BBK History Checked  Patient has bt 














Assessment & Plan


(1) Slurred speech


Status: Resolved   





(2) Transient ischemic attack (TIA)


Assessment and Plan: 


The patient has multiple risk factors for stroke. She is on coumadin and the 

most recent INR is 1.99.  I recommend the followin. Telemetry


2. MRI brain without contrast


3. Echocardiogram


4. Cardiology consult for pauses/possible pacing


5. HbA1c, B12, folate, lipid panel


6. Continue Lipitor to keep LDL < 70; Continue coumadin for INR 2-3


7. PT/OT eval


8. Fluids with NS at 100 mL/hr


9. Case management consult





Thank you for this consultation. 


Status: Acute

## 2018-10-18 NOTE — CARD
--------------- APPROVED REPORT --------------





Date of service: 10/17/2018



EKG Measurement

Heart [id number]

VT [id number]

[id number]

[id number]

[id number]



<Conclusion>

Sinus bradycardia with sinus arrhythmia

Left anterior fascicular block

Left ventricular hypertrophy

Cannot rule out Septal infarct, age undetermined

Abnormal ECG

## 2018-10-18 NOTE — MRI
Date of service: 



10/18/2018



PROCEDURE:  MRI BRAIN WITHOUT CONTRAST



HISTORY:

tia



COMPARISON:

07/27/2017 MRI 



TECHNIQUE:

Multiplanar, multisequence MR images of the brain were obtained 

without intravenous contrast enhancement.



FINDINGS:



HEMORRHAGE:

None



DWI:

No evidence of an acute or early subacute infarction.



BRAIN PARENCHYMA:

No mass effect or edema. Chronic microvascular changes are seen in 

the periventricular white matter.  There is mild atrophy



VENTRICLES:

Unremarkable. No hydrocephalus.



CRANIUM:

Unremarkable.



ORBITS:

Grossly unremarkable.



PARANASAL SINUSES/MASTOIDS:

Clear



VASCULAR SYSTEM:

Skull base flow voids intact.



OTHER FINDINGS:

None. 



IMPRESSION:

No acute intracranial findings

## 2018-10-18 NOTE — CP.PCM.CON
History of Present Illness





- History of Present Illness


History of Present Illness: 





Awake, alert, no arm drift, able to move legs, able to talk, slow but able to 

understand





Reason for consultation: Cardiac evaluation of Atrial fibrillation, 





Brief history of present illness: A 89 year old female who was brought to the ER

due to slurred speech and inability to write her name. History of hypertension, 

atrial fibrillation (on coumadin) COPD, CVA, seizures, diabetes, breast cancer 

s/p radical mastectomy, dementia, and multiple falls, hard of hearing.





Seen and examined by me and Dr. Pearce





Review of Systems





- Review of Systems


All systems: reviewed and no additional remarkable complaints except


Review of Systems: 





as per HPI





Past Patient History





- Infectious Disease


Hx of Infectious Diseases: None





- Tetanus Immunizations


Tetanus Immunization: Unknown





- Past Social History


Smoking Status: Never Smoked





- CARDIAC


Hx Cardiac Disorders: Yes (Afib (on Coumadin))


Hx Hypertension: Yes





- PULMONARY


Hx Respiratory Disorders: Yes


Hx Chronic Obstructive Pulmonary Disease (COPD): Yes





- NEUROLOGICAL


Hx Neurological Disorder: Yes


HX Cerebrovascular Accident: Yes





- HEENT


Hx HEENT Problems: Yes (Hard of hearing)





- RENAL


Hx Chronic Kidney Disease: No





- ENDOCRINE/METABOLIC


Hx Endocrine Disorders: Yes


Hx Diabetes Mellitus Type 2: Yes





- HEMATOLOGICAL/ONCOLOGICAL


Hx Blood Disorders: Yes


Hx Cancer: Yes (Breast CA)


Hx Chemotherapy: Yes





- INTEGUMENTARY


Hx Dermatological Problems: Yes


Other/Comment: RADICAL MASTECTOMY. LEFT CHEST WITH A THICKENED PLETHORA OF SKIN.

RIGHT CHEST WITH SCABBED REDDENED AREA.





- MUSCULOSKELETAL/RHEUMATOLOGICAL


Hx Musculoskeletal Disorders: Yes


Hx Falls: No


Hx Unsteady Gait: Yes (WALKER)





- GASTROINTESTINAL


Hx Gastrointestinal Disorders: Yes (CONSTIPATION)





- GENITOURINARY/GYNECOLOGICAL


Hx Genitourinary Disorders: Yes


Hx Incontinence: Yes


Hx Urinary Tract Infection: Yes





- PSYCHIATRIC


Hx Psychophysiologic Disorder: No


Hx Substance Use: No





- SURGICAL HISTORY


Hx Surgeries: Yes


Hx Mastectomy: Yes





- ANESTHESIA


Hx Anesthesia: Yes


Hx Anesthesia Reactions: No


Hx Malignant Hyperthermia: No





Meds


Allergies/Adverse Reactions: 


                                    Allergies











Allergy/AdvReac Type Severity Reaction Status Date / Time


 


FISH Allergy  NAUSEA Verified 10/17/18 22:04


 


Sulfa (Sulfonamide Allergy  HEADACHE Verified 10/17/18 22:04





Antibiotics)     














- Medications


Medications: 


                               Current Medications





Atorvastatin Calcium (Lipitor)  10 mg PO DIN JOSHUA


Donepezil HCl (Aricept)  5 mg PO HS JOSHUA


Sodium Chloride (Sodium Chloride 0.9%)  1,000 mls @ 40 mls/hr IV .Q24H JOSHUA


   Last Admin: 10/17/18 20:58 Dose:  40 mls/hr


Levetiracetam (Keppra)  500 mg PO BID [facility]


   Last Admin: 10/18/18 10:53 Dose:  500 mg


Losartan Potassium (Cozaar)  100 mg PO DAILY [facility]


   Last Admin: 10/18/18 10:51 Dose:  100 mg


Pantoprazole Sodium (Protonix Ec Tab)  40 mg PO 0600 [facility]


   Last Admin: 10/18/18 06:05 Dose:  Not Given


Polyethylene Glycol (Miralax)  17 gm PO DAILY [facility]


   Last Admin: 10/18/18 10:53 Dose:  17 gm


Risperidone (Risperdal Tab)  0.5 mg PO 1000,2200 JOSHUA; Protocol


Warfarin Sodium (Coumadin)  3 mg PO 1800 JOSHUA; Protocol











Physical Exam





- Constitutional


Appears: Non-toxic, No Acute Distress





- Head Exam


Head Exam: NORMAL INSPECTION, NORMOCEPHALIC





- ENT Exam


ENT Exam: Mucous Membranes Dry


Additional comments: 





hard of hearing, uses hearing aide





- Respiratory Exam


Respiratory Exam: Decreased Breath Sounds, Clear to Auscultation Bilateral, 

NORMAL BREATHING PATTERN





- Cardiovascular Exam


Cardiovascular Exam: Bradycardia, +S1, +S2


Additional comments: 





Telemetry- Sinus bradycardia 50's


denies chest pain


no JVD





- GI/Abdominal Exam


GI & Abdominal Exam: Normal Bowel Sounds, Soft





- Extremities Exam


Extremities exam: Positive for: normal capillary refill, normal inspection





- Neurological Exam


Neurological exam: Alert





- Psychiatric Exam


Psychiatric exam: Normal Affect, Normal Mood





- Skin


Skin Exam: Dry, Normal Color, Warm





Results





- Vital Signs


Recent Vital Signs: 


                                Last Vital Signs











Temp  98.1 F   10/18/18 06:00


 


Pulse  69   10/18/18 06:00


 


Resp  18   10/18/18 06:00


 


BP  140/79   10/18/18 06:00


 


Pulse Ox  95   10/18/18 00:01














- Labs


Result Diagrams: 


                                 10/18/18 07:00





                                 10/18/18 07:00


Labs: 


                         Laboratory Results - last 24 hr











  10/17/18 10/17/18 10/17/18





  16:11 16:11 16:11


 


WBC  7.6  


 


RBC  4.31  


 


Hgb  12.9  


 


Hct  38.5  


 


MCV  89.3  


 


MCH  29.9  


 


MCHC  33.5  


 


RDW  12.3  


 


Plt Count  201  


 


MPV  9.8  


 


Gran %  62.8  


 


Lymph % (Auto)  24.9  


 


Mono % (Auto)  9.2 H  


 


Eos % (Auto)  2.2  


 


Baso % (Auto)  0.9  


 


Gran #  4.78  


 


Lymph # (Auto)  1.9  


 


Mono # (Auto)  0.7 H  


 


Eos # (Auto)  0.2  


 


Baso # (Auto)  0.07  


 


PT   21.3 H 


 


INR   1.85 


 


APTT   38.5 H 


 


Sodium    138


 


Potassium    4.5


 


Chloride    102


 


Carbon Dioxide    27


 


Anion Gap    14


 


BUN    18


 


Creatinine    0.6 L


 


Est GFR ( Amer)    > 60


 


Est GFR (Non-Af Amer)    > 60


 


POC Glucose (mg/dL)   


 


Random Glucose    135 H


 


Hemoglobin A1c   


 


Calcium    9.1


 


Total Bilirubin    0.5


 


AST    40 H D


 


ALT    29


 


Alkaline Phosphatase    61


 


Troponin I    0.07  D


 


Total Protein    7.4


 


Albumin    4.2


 


Globulin    3.3


 


Albumin/Globulin Ratio    1.3


 


Triglycerides    147


 


Cholesterol    186


 


LDL Cholesterol Direct    120


 


HDL Cholesterol    37


 


TSH 3rd Generation   


 


Urine Color   


 


Urine Appearance   


 


Urine pH   


 


Ur Specific Gravity   


 


Urine Protein   


 


Urine Glucose (UA)   


 


Urine Ketones   


 


Urine Blood   


 


Urine Nitrate   


 


Urine Bilirubin   


 


Urine Urobilinogen   


 


Ur Leukocyte Esterase   


 


Blood Type   


 


Antibody Screen   


 


BBK History Checked   














  10/17/18 10/17/18 10/17/18





  16:11 17:50 21:10


 


WBC   


 


RBC   


 


Hgb   


 


Hct   


 


MCV   


 


MCH   


 


MCHC   


 


RDW   


 


Plt Count   


 


MPV   


 


Gran %   


 


Lymph % (Auto)   


 


Mono % (Auto)   


 


Eos % (Auto)   


 


Baso % (Auto)   


 


Gran #   


 


Lymph # (Auto)   


 


Mono # (Auto)   


 


Eos # (Auto)   


 


Baso # (Auto)   


 


PT   


 


INR   


 


APTT   


 


Sodium   


 


Potassium   


 


Chloride   


 


Carbon Dioxide   


 


Anion Gap   


 


BUN   


 


Creatinine   


 


Est GFR ( Amer)   


 


Est GFR (Non-Af Amer)   


 


POC Glucose (mg/dL)    94


 


Random Glucose   


 


Hemoglobin A1c  5.5  


 


Calcium   


 


Total Bilirubin   


 


AST   


 


ALT   


 


Alkaline Phosphatase   


 


Troponin I   


 


Total Protein   


 


Albumin   


 


Globulin   


 


Albumin/Globulin Ratio   


 


Triglycerides   


 


Cholesterol   


 


LDL Cholesterol Direct   


 


HDL Cholesterol   


 


TSH 3rd Generation   


 


Urine Color   Light yellow 


 


Urine Appearance   Clear 


 


Urine pH   7.0 


 


Ur Specific Gravity   <= 1.005 


 


Urine Protein   Negative 


 


Urine Glucose (UA)   Negative 


 


Urine Ketones   Negative 


 


Urine Blood   Negative 


 


Urine Nitrate   Negative 


 


Urine Bilirubin   Negative 


 


Urine Urobilinogen   0.2 


 


Ur Leukocyte Esterase   Negative 


 


Blood Type   


 


Antibody Screen   


 


BBK History Checked   














  10/18/18 10/18/18 10/18/18





  07:00 07:00 07:00


 


WBC    6.5


 


RBC    4.11


 


Hgb    12.2


 


Hct    36.5


 


MCV    88.8


 


MCH    29.7


 


MCHC    33.4


 


RDW    12.2


 


Plt Count    226


 


MPV    9.8


 


Gran %   


 


Lymph % (Auto)   


 


Mono % (Auto)   


 


Eos % (Auto)   


 


Baso % (Auto)   


 


Gran #   


 


Lymph # (Auto)   


 


Mono # (Auto)   


 


Eos # (Auto)   


 


Baso # (Auto)   


 


PT   23.2 H 


 


INR   1.99 


 


APTT   


 


Sodium  139  


 


Potassium  3.8  


 


Chloride  102  


 


Carbon Dioxide  30  


 


Anion Gap  10  


 


BUN  15  


 


Creatinine  0.6 L  


 


Est GFR ( Amer)  > 60  


 


Est GFR (Non-Af Amer)  > 60  


 


POC Glucose (mg/dL)   


 


Random Glucose  81  


 


Hemoglobin A1c   


 


Calcium  8.8  


 


Total Bilirubin   


 


AST   


 


ALT   


 


Alkaline Phosphatase   


 


Troponin I   


 


Total Protein   


 


Albumin   


 


Globulin   


 


Albumin/Globulin Ratio   


 


Triglycerides  121  


 


Cholesterol  170  


 


LDL Cholesterol Direct  103  


 


HDL Cholesterol  33  


 


TSH 3rd Generation   


 


Urine Color   


 


Urine Appearance   


 


Urine pH   


 


Ur Specific Gravity   


 


Urine Protein   


 


Urine Glucose (UA)   


 


Urine Ketones   


 


Urine Blood   


 


Urine Nitrate   


 


Urine Bilirubin   


 


Urine Urobilinogen   


 


Ur Leukocyte Esterase   


 


Blood Type   


 


Antibody Screen   


 


BBK History Checked   














  10/18/18 10/18/18 10/18/18





  07:00 07:12 08:50


 


WBC   


 


RBC   


 


Hgb   


 


Hct   


 


MCV   


 


MCH   


 


MCHC   


 


RDW   


 


Plt Count   


 


MPV   


 


Gran %   


 


Lymph % (Auto)   


 


Mono % (Auto)   


 


Eos % (Auto)   


 


Baso % (Auto)   


 


Gran #   


 


Lymph # (Auto)   


 


Mono # (Auto)   


 


Eos # (Auto)   


 


Baso # (Auto)   


 


PT   


 


INR   


 


APTT   


 


Sodium   


 


Potassium   


 


Chloride   


 


Carbon Dioxide   


 


Anion Gap   


 


BUN   


 


Creatinine   


 


Est GFR ( Amer)   


 


Est GFR (Non-Af Amer)   


 


POC Glucose (mg/dL)   73 


 


Random Glucose   


 


Hemoglobin A1c   


 


Calcium   


 


Total Bilirubin   


 


AST   


 


ALT   


 


Alkaline Phosphatase   


 


Troponin I   


 


Total Protein   


 


Albumin   


 


Globulin   


 


Albumin/Globulin Ratio   


 


Triglycerides   


 


Cholesterol   


 


LDL Cholesterol Direct   


 


HDL Cholesterol   


 


TSH 3rd Generation  0.65  


 


Urine Color   


 


Urine Appearance   


 


Urine pH   


 


Ur Specific Gravity   


 


Urine Protein   


 


Urine Glucose (UA)   


 


Urine Ketones   


 


Urine Blood   


 


Urine Nitrate   


 


Urine Bilirubin   


 


Urine Urobilinogen   


 


Ur Leukocyte Esterase   


 


Blood Type    A POSITIVE


 


Antibody Screen    Negative


 


BBK History Checked    Patient has bt














Assessment & Plan





- Assessment and Plan (Free Text)


Assessment: 





A 89 year old female admitted due to slurred speech and inability to write her 

name. History of hypertension, atrial fibrillation (on coumadin) COPD, CVA, 

seizures, diabetes, breast cancer s/p radical mastectomy, dementia, and multiple

falls, hard of hearing. CT of head, negative for bleeding. Will order echo to 

evaluate LV function and valve status.





Review of previous cardiac work up at [facility] [place]:





12/21/16- Carotid Ultrasound- Bilateral 20-39% proximal ICA stenosis





4/24/17- ECHO- LV size normal,Mild aortic stenosis, aortic valve is mildly 

calcified





10/9/2012- Cardiac catheterization done by Dr. Berger


               Left main normal


               Proximal LAD 50-60% stenosis


               Mid LAD  70% stenosis


               Mid circumflex 80% stenosis, OM1- 80 % stenosis


               Proximal RCA 95 % stenosis


               Severe aortic stenosis, Aortic valve gradient 69 mmHg,  ARTHUR 0.43 

cm2


               Recommendation was to have open heart surgery.








Plan: 





Denies chest pain, hard of hearing 


Able to talk, slow, minimal slurring, able to understand words


No arm drifting


Heart rate and blood pressure controlled


Echo to evaluate LV function


Telemetry sinus bradycardia


Continue Coumadin 


Normal TSH level


On Lipitor 10 mg daily,Keppra 500 mg BID,


Cozaar 100 mg daily,Protonix 40 mg daily.


Continue current treatment


Continue current medications


Neuro on consult


Chart reviewed


Will follow up


Further recommendation during hospital course





Plan and treatment discussed with Dr. Pearce





Thank you Dr. Muller for the opportunity of taking care of Ms. antonieta Castañeda











- Date & Time


Date: 10/18/18


Time: 06:30

## 2018-10-19 RX ADMIN — PANTOPRAZOLE SODIUM SCH MG: 40 TABLET, DELAYED RELEASE ORAL at 06:15

## 2018-10-19 RX ADMIN — POLYETHYLENE GLYCOL 3350 SCH GM: 17 POWDER, FOR SOLUTION ORAL at 10:12

## 2018-10-19 NOTE — HP
DATE OF EXAM:  10/17/2018



CHIEF COMPLAINT:  Altered mental status.



HISTORY OF PRESENT ILLNESS:  Ms. Nohelia Castañeda is an 89-year-old female

with past medical history of hypertension, atrial fibrillation, COPD, CVA,

seizure, diabetes mellitus, breast cancer, status post radical mastectomy,

dementia, multiple falls, came to the Emergency Department via EMS for

evaluation of slurred speech since 20 minutes ago as per EMS.  Family

noticed her slurred speech and inability to write her name 20 minutes ago

and subsequently called EMS for evaluation.  On arrival to the Emergency

Department, the patient is mildly confused and expressed speech aphasia.  I

did examine the patient in the telemetry; at that time, she was moving all

four extremities.  Power is good.  She recognized me, but still lethargic,



PAST MEDICAL HISTORY:  As above.  Hypertension; COPD, CVA, seizure; history

of multiple times falls, history of atrial fibrillation, on Coumadin; hard

of hearing, diabetes mellitus type 2, breast cancer, she had mastectomy.



FAMILY HISTORY:  Father and mother, noncontributory.



ALLERGIES:  THE PATIENT IS ALLERGIC WITH FISH AND SULFA.



REVIEW OF SYSTEMS:  The patient is seen and examined on the bedside in the

telemetry.  The patient is lethargic, but still responding to questions,

moving all four extremities.  No fevers.  No chills.  No hematuria or

hematochezia.  No headache.  No dizziness.  She is a poor historian.



PHYSICAL EXAMINATION

VITAL SIGNS:  Temperature 98.0, pulse 71, blood pressure 154/76, and

respiratory rate 18.

HEENT:  Head normocephalic, atraumatic.  Eyes PERRLA.  Extraocular muscles

intact.  Conjunctivae clear.  Nose patent.

NECK:  Supple.  No carotid bruit.  No JVD or thyromegaly.

CHEST:  Bilaterally symmetrical.

HEART:  S1 and S2 positive.

LUNGS:  Clear to auscultation.

ABDOMEN:  Soft.  Bowel sounds positive.  No organomegaly.

EXTREMITIES:  No edema.  No cyanosis.  Moving all 4 extremities.  Power is

equal.



LABORATORY DATA:  White blood cells 7.6, hemoglobin 12.9. hematocrit 38.5,

platelets 201.  Sodium 138, potassium 4.5, BUN 18, creatinine 0.6, glucose

135, AST 40.



IMPRESSION:  Ms. Nohelia Castañeda is an 89-year-old lady with hyperglycemia,

abnormal liver function test.  Urinalysis within normal limits.  INR is

1.85.  The patient is noncompliant with Coumadin, came with altered mental

status.  CAT scan of the head and neck done showed unremarkable CT angio of

the brain.  CAT scan of the head was reviewed by me.  Electrocardiograph

and chest x-ray reviewed.  History of hypertension, atrial fibrillation,

chronic obstructive pulmonary disease, cerebrovascular disease, seizure,

diabetes, history of breast cancer, radical mastectomy, dementia, came with

transient ischemic attack, admitted in the hospital.  Neurology consult

called.  RTPA inclusion and exclusion criteria protocol reviewed.  The

patient failed swallowing evaluation in the emergency room and lethargic. 

We will hold all p.o. medications.  Tomorrow we will do another swallowing

evaluation if possible.  We will start her back to her regular floor.  We

will repeat labs, under observation.  We will follow up.





__________________________________________

Jennifer Muller MD



DD:  10/17/2018 22:26:12

DT:  10/18/2018 3:56:29

Job # [id number]

## 2018-10-19 NOTE — CON
ADDENDUM TO THE CONSULT ALREADY DONE BY ITZEL NELSON



DATE OF CONSULTATION:  10/18/2018



The patient in Room 277, Bed 2.



REASON FOR CONSULTATION:  Atrial fibrillation.



The patient had episodes where she could not write her name and

questionable slurring of speech.  The patient was at home on sotalol and

Coumadin.  The patient also has COPD, seizure, diabetes, history of

dementia.  I reviewed the chart and examined the patient now.  The patient

also has some pauses on the monitor and the patient's Sotalol is on hold. 

In reviewing through the charts, I found that Dr. Iqbal had seen the patient

last year, so I am going to discuss with Dr. Iqbal in the morning.  In the

meantime, sotalol is on hold.  TSH still has been always down at 0.65.





__________________________________________

Claire Pearce MD





DD:  10/18/2018 18:56:20

DT:  10/19/2018 0:10:46

Job # [id number]

## 2018-10-19 NOTE — PN
DATE:  10/18/2018



SUBJECTIVE:  Patient is an 89-year-old female.  Patient was seen and

examined on the bedside, looking comfortable, sitting, having her dinner,

disoriented x3.  No nausea, vomiting, or diarrhea.  No hematuria or

hematochezia.  No swelling of the leg.  No chest pain.  No palpitation.  No

headache.  No dizziness.  Patient was at that time asymptomatic.  Patient

has few reported pauses on telemetry lasting about 2.5 to 3 seconds.



PHYSICAL EXAMINATION:

VITAL SIGNS:  Temperature 98.6, pulse 50, respiratory rate 18, blood

pressure 150/80.

HEENT:  Head:  Normocephalic, atraumatic.  Eyes:  PERRLA.  Extraocular

muscles intact.  Conjunctivae clear.  Nose patent.  Mucous membranes moist.

NECK:  Supple.  No carotid bruit.  No JVD or thyromegaly.

CHEST:  Bilaterally symmetrical.

HEART:  S1 and S2 positive.

LUNGS:  Clear to auscultation.

ABDOMEN:  Soft.  Bowel sounds present.  No organomegaly.

EXTREMITIES:  No edema.  No cyanosis.

NEUROLOGIC:  Patient is awake and alert.  Moving all 4 extremities.  No

focal deficit.



MEDICATIONS:  Lipitor, hydralazine, NS, Keppra, Cozaar.



LABORATORY DATA:  White blood cells 7.6, hemoglobin 12.9, hematocrit 38.5,

platelets 226.  Sodium 139, potassium 3.8, BUN 15, creatinine 0.6,

81.



ASSESSMENT AND LICHA an 89-year-old female with multiple

medical problems, came with altered mental status and multiple risk factors

for stroke.  She has atrial fibrillation.  She was on Coumadin and most

recent MRI is done, Neurology suggested observing the patient on Telemetry.

Repeat MRI without contrast, echocardiogram.  Cardiologist on the consult

for possibly repeating . Lipitor, hemoglobin A1c, B12 and folic acid

panel, NS.  Patient has a history of atrial fibrillation, and for that she

was on sotalol.  With sotalol, when she came, she had bradycardia, heart

rate in 50s.  We will hold sotalol now.  Last night, she had pauses. 

History of dementia, hypertension, hypercholesterolemia, depression, came

with slurring speech and inability to write name, but right now, patient is

better at baseline.  History of chronic obstructive pulmonary disease;

cerebrovascular accident; seizures; history of breast cancer, status post

radical mastectomy; multiple falls; hard of hearing.  In 2012, patient's

cardiac catheterization was done with Dr. Berger.  Denies chest pain. 

Able to talk.  We will continue present treatment.  Monitoring patient on

the telemetry.  Physical therapy.  We will follow up.

__________________________________________

Jennifer Muller MD



DD:  10/18/2018 20:42:28

DT:  10/18/2018 22:06:53

Job # [id number]

MTDD

## 2018-10-19 NOTE — CARD
--------------- APPROVED REPORT --------------





Date of service: 10/18/2018



EXAM: Two-dimensional and M-mode echocardiogram with Doppler and 

color Doppler.



Other Information 

Quality : Technically LimitedRhythm : 



INDICATION

R/O STROKE; LV FX



2D DIMENSIONS 

IVSd1.2   (0.7-1.1cm)LVDd4.1   (3.9-5.9cm)

PWd1.1   (0.7-1.1cm)LVDs2.7   (2.5-4.0cm)

FS (%) 33.7   %LVEF (%)63.0   (>50%)



M-Mode DIMENSIONS 

Aortic Root2.70   (2.2-3.7cm)Aortic Cusp Exc.0.90   (1.5-2.0cm)



Aortic Valve

AoV Peak Ynhiepzc050.0cm/Dariana Peak GR.12mmHg



Mitral Valve

MV E Bltnqzwe728.0cm/sMV E Peak Gr.15mmHgMV A Jzycevpv250.0cm/s

MV E Mean Gr.6mmHgMV [id number]/A ratio0.7

MVA (PHT)1.79cm2



TDI

Lateral E' Peak V7.90cm/sMedial E' Peak V4.09cm/sE/Lateral E'13.5

E/Medial E'26.2



Pulmonary Valve

PV Peak Rqoewruj07.5cm/sPV Peak Grad.2mmHg



Tricuspid Valve

TR Peak Makmhprq171of/sRAP [id number] Peak Gr.14mmHg

[id number]



 LEFT VENTRICLE 

The left ventricle is normal size. There is mild concentric left 

ventricular hypertrophy. The left ventricular function is normal. The 

left ventricular ejection fraction is within the normal range. There 

is normal LV segmental wall motion.



 RIGHT VENTRICLE 

The right ventricle is not well visualized.



 ATRIA 

The left atrium is mildly dilated. The right atrium is not well 

visualized.



 AORTIC VALVE 

The aortic valve is moderately calcified.



 MITRAL VALVE 

The mitral valve is moderately thickened but opens well. Mitral 

annular calcification is severe. The subvalvular structures are 

calcified.



 TRICUSPID VALVE 

The tricuspid valve is not well visualized. There is trace to mild 

tricuspid regurgitation.



 PULMONIC VALVE 

The pulmonic valve is not well visualized.



 GREAT VESSELS 

The aortic root is normal in size.



 PERICARDIAL EFFUSION 

There is no pericardial effusion.



<Conclusion>

This is a limited study.

The left ventricle is normal size. There is mild concentric left 

ventricular hypertrophy. The left ventricular function is normal.

The aortic valve is moderately calcified. Aortic sclerosis vs. mild 

AS.

The MV leaflets, annulus and subvalvular structures are calcified. 

Possible mild functional mitral stenosis.

## 2018-10-20 RX ADMIN — PANTOPRAZOLE SODIUM SCH MG: 40 TABLET, DELAYED RELEASE ORAL at 05:02

## 2018-10-20 RX ADMIN — POLYETHYLENE GLYCOL 3350 SCH GM: 17 POWDER, FOR SOLUTION ORAL at 11:01

## 2018-10-20 NOTE — CON
DATE OF CONSULTATION:  10/20/2018



REQUESTING PHYSICIAN:  Jennifer Muller MD



REASON FOR CONSULTATION:  Transient heart block.



HISTORY:  This is an 89-year-old woman, well known to us with a history of

aortic stenosis and coronary artery disease, status post cardiac bypass

surgery and aortic valve replacement as well as a history of atrial

flutter, who was admitted with altered mental status.  She reportedly had

slurred speech and confusion.  She was brought to the emergency room and

was apparently lethargic.  Her neurologic symptoms have improved and she

has returned to her baseline.  An MRI showed no acute findings.  On

telemetry, she was noted to have sinus pauses of up to 4 seconds.  She had

previously been on sotalol and this has just been placed on hold.  She has

continued to have brief pauses of 2 to 2.5 seconds.  She denies any

lightheadedness.



PAST HISTORY:  Notable for a history of prior breast cancer, for which she

underwent mastectomy, history of COPD, hypertension, diabetes, seizure

disorder, prior cerebrovascular accidents.  She has had falls in the past. 

Despite this, she has been maintained on Coumadin therapy.



FAMILY HISTORY:  Both parents have  from age-related illness.



SOCIAL HISTORY  She does not smoke or drink.



CURRENT MEDICATIONS:  Coumadin, Cozaar 100 mg daily, Keppra 5 mg b.i.d.,

Lipitor 10 mg daily, Protonix 40 mg daily, Risperdal 0.5 mg b.i.d., and

Aricept 5 mg daily.



ALLERGIES:  SHE HAS HAD REACTION TO SULFAS IN THE PAST.



REVIEW OF SYSTEMS:  A 10-point review of systems is notable mainly for

problems in the HPI.



PHYSICAL EXAMINATION:  She is a very elderly woman, who appears comfortable

at the present time.  Her blood pressure is 126/80 with a pulse of 60 in

sinus, respirations are 14.  She is currently afebrile.  HEENT: 

Normocephalic, atraumatic.  NECK:  Supple.  No JVD noted.  CHEST: 

Bilateral rhonchi heard.  HEART:  PMI displaced laterally with a systolic

murmur present in the left sternal border.  ABDOMEN:  Soft, nontender. 

Normoactive bowel sounds.  EXTREMITIES:  No edema.  SKIN:  Warm and dry. 

PSYCHIATRIC:  Affect appears normal.  Alert and oriented x3.  No gross

motor or sensory defects appreciable.



DIAGNOSTIC DATA:  White count 6.5, hemoglobin and hematocrit 12.2 and 36.5,

with platelet count 226,000.  INR is 1.99.  Potassium 3.8, BUN and

creatinine 15 and 0.6.  TSH 0.65.  Electrocardiogram reveals sinus

bradycardia with LVH and left anterior hemiblock.  Chest x-ray reveals post

sternotomy changes with mildly enlarged cardiac silhouette.  Haziness is

noted at the left base.



IMPRESSION:

1.  Altered mental status and aphasia on admission, appears to have

resolved.

2.  Coronary artery disease status post cardiac bypass surgery and aortic

valve replacement, stable at present.

3.  Transient sinus pauses possibly due to sotalol administration as well

as Aricept, which can also cause conduction disturbances.

4.  History of paroxysmal atrial fibrillation.

5.  Rest of problems as noted.



RECOMMENDATIONS:  In addition to her sotalol, Aricept will be placed on

hold for now.  Continued observation on telemetry is advised.  If she

develops rapid atrial fibrillation, consideration may need to be given to a

pacemaker implant to prevent excessive bradycardia and allow resumption of

rate control therapy.  In the interim, conservative management will be

attempted.



Thank you for this consultation.  I will be happy to follow along with you

throughout the hospital course.





__________________________________________

Sg Berger MD





DD:  10/20/2018 12:22:19

DT:  10/20/2018 12:24:52

Job # [id number]

## 2018-10-20 NOTE — PN
DATE:  10/19/2018



SUBJECTIVE:  The patient is 89-year-old female.  The patient was seen and

examined on the bedside, looking comfortable.  No fevers.  No chills.  No

nausea, vomiting, or diarrhea.  No hematuria or hematochezia.  No swelling

of the leg.  No chest pain.  No palpitation.



PHYSICAL EXAMINATION:

VITAL SIGNS:  Temperature 97.7, pulse 67, blood pressure 157/71,

respiratory rate 18.

HEENT:  Head:  Normocephalic, atraumatic.  Eyes:  PERRLA.  Extraocular

muscles intact.  Conjunctivae clear.  Nose patent.  Mucous membranes moist.

NECK:  Supple.  No carotid bruit.  No JVD or thyromegaly.

CHEST:  Bilaterally symmetrical.

HEART:  S1 and S2 positive.

LUNGS:  Clear to auscultation.

ABDOMEN:  Soft.  Bowel sounds present.  No organomegaly.

EXTREMITIES:  No edema.  No cyanosis.

NEUROLOGIC:  The patient is awake and alert.  Moving all 4 extremities.  No

focal deficit.



MEDICATIONS:  Hydralazine, Aricept, Coumadin, Cozaar, Keppra, Lipitor,

MiraLax, pantoprazole, Risperdal, NS.



LABORATORY DATA:  White blood cells 6.5, hemoglobin 12.2, hematocrit 36.5,

platelets 226.  INR is 1.99.  Glucose 209, 227, 133.



ASSESSMENT AND PLAN:  Ms. Nohelia Castañeda is an 89-year-old female with

diabetes mellitus, came with altered mental status.  The patient had atrial

fibrillation, was on sotalol, was on Coumadin.  History of chronic

obstructive pulmonary disease, seizures, diabetes mellitus, history of

dementia.  The patient had pauses on the monitor and the patient's sotalol

is on hold.  History of dementia, multiple falls.  Allergist is on the

case.  Continue present treatment.  Gastric and deep venous thrombosis

prophylaxis, fall precautions.  The patient is back to her baseline.  We

will follow up.







__________________________________________

Jennifer Muller MD



DD:  10/19/2018 23:13:34

DT:  10/20/2018 0:11:08

Job # [id number]

## 2018-10-21 LAB
INR PPP: 1.44
PROTHROMBIN TIME: 16.5 SECONDS (ref 9.4–12.5)

## 2018-10-21 RX ADMIN — PANTOPRAZOLE SODIUM SCH MG: 40 TABLET, DELAYED RELEASE ORAL at 05:03

## 2018-10-21 RX ADMIN — POLYETHYLENE GLYCOL 3350 SCH GM: 17 POWDER, FOR SOLUTION ORAL at 10:22

## 2018-10-21 RX ADMIN — INSULIN HUMAN SCH: 100 INJECTION, SOLUTION PARENTERAL at 22:00

## 2018-10-21 RX ADMIN — INSULIN HUMAN SCH U: 100 INJECTION, SOLUTION PARENTERAL at 18:26

## 2018-10-21 NOTE — PN
DATE:  10/21/2018



SUBJECTIVE:  The patient is seen lying in bed on telemetry.  She is

currently comfortable.  She is anxious to go home.  She has had no further

evidence of conduction disruption.



CURRENT MEDICATIONS:  Include hydralazine p.r.n., Coumadin, Cozaar, Keppra,

Lipitor, MiraLax, Protonix, Risperdal.



OBJECTIVE:

GENERAL:  She is a very elderly woman who appears comfortable at rest.

VITAL SIGNS:  Blood pressure is 128/68 with pulse 76 and sinus,

respirations are 16.  She is afebrile.

HEENT:  No JVD.

CHEST:  Few scattered rhonchi.

HEART:  Systolic murmur at left sternal border.

ABDOMEN:  Soft, nontender.  Normoactive bowel sounds.

EXTREMITIES:  No edema.



DIAGNOSTIC DATA:  Morning blood work is pending.



IMPRESSION:

1.  Altered mental status and aphasia on admission, appears resolved.

2.  Coronary artery disease status post prior bypass surgery and aortic

valve replacement, stable.

3.  Transient sinus pauses while on sotalol and Aricept with no recurrence

since discontinuation.

4.  History of paroxysmal atrial fibrillation.

5.  Rest of problems as noted.



RECOMMENDATIONS:  Aricept and sotalol will continue to be withheld for now.

If she has further sinus pause of significance then permanent pacemaker

implant may need to be considered.  In addition, she has recurrent rapid

atrial fibrillation.  Rate control therapy will need to be resumed and

permanent pacemaker maybe necessary at that time as well.  In the interim,

we will continue to monitor on telemetry.  We will continue to follow and

make further recommendations as appropriate.





__________________________________________

Sg Berger MD



DD:  10/21/2018 8:41:18

DT:  10/21/2018 8:43:13

Job # [id number]

## 2018-10-21 NOTE — PN
DATE:  10/20/2018



SUBJECTIVE:  Patient is 89-year-old female.  Patient was seen and examined

at the bedside on 10/20/2018, looking comfortable.  No nausea.  No

vomiting.  No diarrhea.  No hematuria or hematochezia.  No swelling of the

leg.  No chest pain.  No palpitation.  No headache.  No dizziness.



PHYSICAL EXAMINATION:

VITAL SIGNS:  Blood pressure 126/80, pulse 60 in sinus rhythm, respiratory

rate 14, temperature at present afebrile.

HEENT:  Head:  Normocephalic, atraumatic.  Eyes:  PERRLA.  Extraocular

muscles intact.  Conjunctivae clear.  Nose patent.

NECK:  Supple.  No carotid bruit.  No JVD or thyromegaly.

CHEST:  Bilaterally symmetrical.

HEART:  S1 and S2 positive.

LUNGS:  Clear to auscultation.

ABDOMEN:  Soft.  Bowel sounds present.  No organomegaly.

EXTREMITIES:  No edema.  No cyanosis.

NEUROLOGICAL:  The patient is awake and alert.  Moving all 4 extremities. 

No focal deficit.



LABORATORY DATA:  White blood cells 6.5, hemoglobin 12.2, hematocrit 36.5,

platelets 226,000.  INR 1.99.  Potassium 3.8, BUN 15, creatinine 0.60 to

0.65.



ASSESSMENT AND PLAN:  Ms. Nohelia Castañeda came with altered mental status and

aphasia on admission, appears to have resolved, looks like transient

ischemic attack, coronary artery disease, status post cardiac bypass

surgery and aortic valve replacement.  Transient sinus pauses possibly due

to Sotalol administration as well as Aricept, which can cause conduction

disturbances, history of paroxysmal atrial fibrillation.  Appreciated Dr. Sg Berger's input and Dr. Berger hold both Sotalol and Aricept. 

Continue observing the patient on telemetry.  If develops rapid atrial

fibrillation, consideration may need to given to the pacemaker implant to

prevent excessive bradycardia and allow resumption of rate control therapy.

In the interim, conservative management will be attempted. 

Gastrointestinal and deep vein thrombosis prophylaxis.  Repeat labs.  We

will follow up.





__________________________________________

Jennifer Muller MD



DD:  10/21/2018 1:17:03

DT:  10/21/2018 4:57:23

Job # [id number]

## 2018-10-21 NOTE — PN
DATE:  10/21/2018



SUBJECTIVE:  The patient is an 89-year-old female.  The patient was seen

and examined at the bedside on 10/21/2018.  Looking comfortable, fully

awake and alert.  No fever.  No chills.  No headache.  No nausea, vomiting,

or diarrhea.  No chest pain or palpitation.



PHYSICAL EXAMINATION:

VITAL SIGNS:  Temperature 98.6, blood pressure 120/68, pulse rate 76, sinus

rhythm, respiratory rate 16.

HEENT:  Head:  Normocephalic, atraumatic.  Eyes:  PERRLA.  Extraocular

muscles intact.  Conjunctivae clear.  Nose patent.  Mucous membrane moist.

NECK:  Supple.  No carotid bruit.  No JVD or thyromegaly.

CHEST:  Bilaterally symmetrical.

HEART:  S1 and S2 positive.

LUNGS:  Clear to auscultation.

ABDOMEN:  Soft.  Bowel sounds present.  No organomegaly.

EXTREMITIES:  No edema.  No cyanosis.

NEUROLOGICAL:  The patient is awake and alert.  Follows simple commands.



MEDICATIONS:  Hydralazine, Coumadin, Cozaar, Keppra, Lipitor, MiraLax,

Protonix, and Risperdal.



LABORATORY DATA:  We do not have recent labs today, but I ordered just stat

PT, INR now.



ASSESSMENT AND PLAN:  Ms. Nohelia Castañeda is an 89-year-old lady came with

altered mental status and aphasia on admission, resolved later on, looks

like transient ischemic attack.  Coronary artery disease, status post prior

bypass surgery and aortic valve replacement, stable.  Transient sinus

pauses while on Sotalol and Aricept with no recurrence since

discontinuation of Sotalol.  History of paroxysmal atrial fibrillation,

that is why she was on Sotalol.  Risk of fall, living alone, not able to do

her activities of daily living.  According to cardiologist, we will

continue Aricept and Sotalol.  If the patient will have further sinus

pauses of significance, then permanent pacemaker implant may need to be

considered.  In addition, she has recurrent rapid atrial fibrillation, rate

control therapy will need to be resumed or place the pacemaker.  In the

interim, we will continue present treatment and monitoring the patient on

the telemetry.  Out of bed, physical therapy.  Repeat labs.  We will follow

up.







__________________________________________

Jennifer Muller MD



DD:  10/21/2018 18:15:44

DT:  10/21/2018 20:01:57

[facility] # [id number]

## 2018-10-22 LAB
BUN SERPL-MCNC: 20 MG/DL (ref 7–21)
CALCIUM SERPL-MCNC: 8.7 MG/DL (ref 8.4–10.5)
ERYTHROCYTE [DISTWIDTH] IN BLOOD BY AUTOMATED COUNT: 12.6 % (ref 11.5–14.5)
GFR NON-AFRICAN AMERICAN: > 60
HGB BLD-MCNC: 12.3 G/DL (ref 12–16)
MCH RBC QN AUTO: 29.4 PG (ref 25–35)
MCHC RBC AUTO-ENTMCNC: 32.8 G/DL (ref 31–37)
MCV RBC AUTO: 89.5 FL (ref 80–105)
PLATELET # BLD: 303 10^3/UL (ref 120–450)
PMV BLD AUTO: 9.6 FL (ref 7–11)
RBC # BLD AUTO: 4.19 10^6/UL (ref 3.5–6.1)
WBC # BLD AUTO: 7.8 10^3/UL (ref 4.5–11)

## 2018-10-22 RX ADMIN — POLYETHYLENE GLYCOL 3350 SCH GM: 17 POWDER, FOR SOLUTION ORAL at 09:23

## 2018-10-22 RX ADMIN — INSULIN HUMAN SCH UNITS: 100 INJECTION, SOLUTION PARENTERAL at 12:48

## 2018-10-22 RX ADMIN — INSULIN HUMAN SCH: 100 INJECTION, SOLUTION PARENTERAL at 22:28

## 2018-10-22 RX ADMIN — PANTOPRAZOLE SODIUM SCH MG: 40 TABLET, DELAYED RELEASE ORAL at 05:15

## 2018-10-22 RX ADMIN — INSULIN HUMAN SCH UNITS: 100 INJECTION, SOLUTION PARENTERAL at 17:21

## 2018-10-22 RX ADMIN — INSULIN HUMAN SCH UNIT: 100 INJECTION, SOLUTION PARENTERAL at 09:22

## 2018-10-22 NOTE — PN
DATE:  10/22/2018



NEUROLOGY FOLLOWUP



CHIEF COMPLAINT:  Follow up for transient aphasia.



SUBJECTIVE:  The patient is seen and examined at the bedside.  She is hard

of hearing at baseline.  She is sitting in the chair with no acute deficit.

Currently, Cardiology is on board for underlying sinus pauses and possible

thinking of a pacemaker.  No acute events overnight.



PAST MEDICAL HISTORY:  History of hypertension, hyperlipidemia, AFib, COPD,

CVA, seizures, diabetes, breast cancer, status post radical mastectomy,

dementia, multiple falls, deconditioned.



SOCIAL HISTORY:  No illicit drug use, smoking or EtOH abuse.



ALLERGIES:  ALLERGIC TO FISH, SULFA ANTIBIOTICS.



MEDICATIONS:  Reviewed by nurses' reconciliation sheet.



FAMILY HISTORY:  Noncontributory.



LABORATORY DATA:  Sodium is 137, potassium 4.5, chloride 104, carbon

dioxide 27, BUN of 20, creatinine 0.6.  Random glucose 184.



PHYSICAL EXAMINATION:

GENERAL:  Patient is sitting up in the chair, in no acute distress.

VITAL SIGNS:  Temperature 97.5, pulse rate of 91, blood pressure 142/70,

respiratory rate of 18, oxygen saturation 98% by room air.

HEENT:  Atraumatic, normocephalic.  PERRLA.  Extraocular muscles intact.

NECK:  Supple.  No JVD, no adenopathy noted.

LUNGS:  Clear to auscultation.  No adventitious sounds.

HEART:  S1, S2.  Normal rate and rhythm.  No murmurs, rubs or gallops.

ABDOMEN:  Soft, nontender and nondistended.  Bowel sounds are present.

EXTREMITIES:  No clubbing.  No cyanosis.  Peripheral pulses 2+ felt

bilaterally.

NEUROLOGICAL:  Patient is alert and oriented to person and place.  Hard of

hearing.  Recall after 5 minutes is 0/3.  Poor attention span.  Slow

thought process.  Cranial nerves II through XII intact.  Motor:  Moves all

extremities equally.  No pronator drift seen.  Sensory exam:  Light touch,

pinprick, proprioception and vibration are intact.  DTRs are 2+ throughout,

1 at both knees and ankles.  Coordination:  Finger-to-nose intact.  No

dysmetria noted.



ASSESSMENT AND PLAN:  This is an 89-year-old woman with past medical

history of hypertension, hyperlipidemia, atrial fibrillation, chronic

obstructive pulmonary disease, cerebrovascular accident, seizures,

diabetes, breast cancer, status post radical mastectomy, dementia, was

admitted for transient slurred speech, which resolved spontaneously.  MRI

of the brain showed no acute intracranial abnormalities and old small left

basal ganglia infarct, which is unchanged.  MRI of the brain failed to show

any acute infarcts.  CT angio of the head and neck was unremarkable. 

Echocardiogram with left ejection fraction 62% with aortic valve moderately

calcified.  Cardiology is on follow up for sinus pauses likely causing

symptoms.  At this time, her overall symptoms were secondary to transient

ischemic attack from possible underlying sinus pauses.  At this time,

continue with secondary stroke prevention measures.  Continue with aspirin

81, Plavix 75 and adequate blood pressure control.  Maintain euglycemia and

avoid tobacco use.  Continue proper healthy heart diet and physical

activity as tolerated, will benefit from subacute rehab to improve her

mobility.



Thank you for this follow up.







__________________________________________

Chris Valera MD



DD:  10/22/2018 13:53:40

DT:  10/22/2018 15:22:56

Job # [id number]

## 2018-10-22 NOTE — CP.PCM.PN
<JaleelJaspal - Last Filed: 10/22/18 12:36>





Subjective





- Date & Time of Evaluation


Date of Evaluation: 10/22/18


Time of Evaluation: 09:00





- Subjective


Subjective: 





Jaspal Marmolejo Internal Medicine Resident- Progress Note on Behalf of Neurology 

Team





Subjective:


Patient seen and examined. No acute events overnight. Offers no new complaints 

at this time.  Denies fever, chills, dizziness, acute visual/auditory changes, 

chest pain, shortness of breath, abdominal pain, and weakness.





12 point ROS negative except as indicated in the HPI





Physical Examination:


- Constitutional


Appears: No Acute Distress, Cachectic





- Head Exam


Head Exam: ATRAUMATIC, NORMAL INSPECTION, NORMOCEPHALIC





- Eye Exam


Eye Exam: EOMI, Normal appearance, PERRL





- ENT Exam


ENT Exam: Mucous Membranes Moist, Normal Exam





- Neck Exam


Neck exam: Positive for: Normal Inspection





- Respiratory Exam


Respiratory Exam: Clear to Auscultation Bilateral, NORMAL BREATHING PATTERN





- Cardiovascular Exam


Cardiovascular Exam: REGULAR RHYTHM, +S1, +S2





- GI/Abdominal Exam


GI & Abdominal Exam: Normal Bowel Sounds, Soft.  absent: Tenderness





- Extremities Exam


Extremities exam: Positive for: normal inspection





- Back Exam


Back exam: NORMAL INSPECTION





- Neurological Exam


Neurological exam: Patient is alert, awake, oriented x 3, CN II-XII Intact,  

Motor Strength: RUE and RLE 5/5 and LUE and LLE 5/5, Sensation intact 

throughout, NIHSS 0





- Psychiatric Exam


Psychiatric exam: Normal Affect, Normal Mood





- Skin


Skin Exam: Dry, Intact, Normal Color, Rash, Warm





Assessment and Plan:


Patient is a 89-year-old woman with a past medical history of hypertension, 

atrial fibrillation, COPD, CVA, seizures, diabetes, breast cancer s/p radical 

mastectomy, dementia, and multiple falls, who was admitted for an episode of 

expressive aphasia that resolved spontaneously.





Transient Ischemic Attack


- Continue to monitor on telemetry


-10/18/2018 MRI brain without contrast- no acute intracranial findings. 

Unremarkable. A1 and A2 segments are widely patent. Smaller distal branches 

unremarkable, as visualized. Unremarkable. M1 and M2 segments are widely patent.

Perisylvian branches grossly symmetric.


-10/17/2018 CT Head without Contrast- No acute intracranial abnormalities. No 

significant findings to account for the clinical presentation. No significant 

interval change compared to the prior examination(s)


-10/17/2018 CT Angiography of the neck with contrast- unremarkable 


-10/18/2018 Echocardiogram- LVEF 63%, aortic valve moderately calcified, MV 

leaflets annulus and subvalvular structures are calcified


-Cardiology consult for pauses/possible pacing


-Continue Lipitor to keep LDL < 70; Continue coumadin for INR 2-3


-Continue secondary stroke prevention measures: consider starting aspirin and/or

plavix, continue adequate blood pressure control, maintain euglycemia, avoid 

tobacco use, continue appropriate heart healthy diet and physical activity as 

tolerated


- PT/OT eval-  Will benefit from a short term rehab stay to improve her mobility





Hx of Seizure


- continue home dose of keppra 500mg PO BID


- seizure precautions





Patient case discussed with attending physician, Dr. De La Cruz. 








Objective





- Vital Signs/Intake and Output


Vital Signs (last 24 hours): 


                                        











Temp Pulse Resp BP Pulse Ox


 


 98 F   86   18   148/79   98 


 


 10/22/18 06:00  10/22/18 06:00  10/22/18 06:00  10/22/18 06:00  10/22/18 06:00








Intake and Output: 


                                        











 10/22/18 10/22/18





 06:59 18:59


 


Intake Total 1503 


 


Output Total 400 


 


Balance 1103 














- Medications


Medications: 


                               Current Medications





Atorvastatin Calcium (Lipitor)  10 mg PO DIN [facility]


   Last Admin: 10/21/18 18:25 Dose:  10 mg


Hydralazine HCl (Apresoline)  10 mg IVP Q6 PRN


   PRN Reason: Hypertension


   Last Admin: 10/21/18 05:12 Dose:  10 mg


Insulin Human Regular (Humulin R Low)  0 units SC ACHS [facility]; Protocol


   Last Admin: 10/21/18 22:00 Dose:  Not Given


Levetiracetam (Keppra)  500 mg PO BID [facility]


   Last Admin: 10/21/18 18:25 Dose:  500 mg


Losartan Potassium (Cozaar)  100 mg PO DAILY [facility]


   Last Admin: 10/21/18 10:21 Dose:  100 mg


Pantoprazole Sodium (Protonix Ec Tab)  40 mg PO 0600 [facility]


   Last Admin: 10/22/18 05:15 Dose:  40 mg


Polyethylene Glycol (Miralax)  17 gm PO DAILY [facility]


   Last Admin: 10/21/18 10:22 Dose:  17 gm


Risperidone (Risperdal Tab)  0.5 mg PO 1000,2200 [facility]; Protocol


   Last Admin: 10/21/18 22:04 Dose:  0.5 mg


Warfarin Sodium (Coumadin)  3 mg PO 1800 [facility]; Protocol


   Last Admin: 10/21/18 20:04 Dose:  3 mg


Warfarin Sodium (Coumadin)  5 mg PO 1800 [facility]; Protocol











- Labs


Labs: 


                                        





                                 10/22/18 05:50 





                                 10/22/18 05:50 





                                        











PT  16.5 SECONDS (9.4-12.5)  H  10/21/18  18:37    


 


INR  1.44   10/21/18  18:37    


 


APTT  38.5 Seconds (25.1-36.5)  H  10/17/18  16:11    














<Raimundo De La Cruz - Last Filed: 10/23/18 11:04>





Objective





- Vital Signs/Intake and Output


Vital Signs (last 24 hours): 


                                        











Temp Pulse Resp BP Pulse Ox


 


 98 F   88   14   107/60   98 


 


 10/23/18 05:42  10/23/18 05:44  10/23/18 05:42  10/23/18 05:42  10/23/18 05:42








Intake and Output: 


                                        











 10/23/18 10/23/18





 06:59 18:59


 


Intake Total 2420 


 


Output Total 1850 


 


Balance 570 














- Medications


Medications: 


                               Current Medications





Atorvastatin Calcium (Lipitor)  10 mg PO DIN [facility]


   Last Admin: 10/22/18 17:21 Dose:  10 mg


Hydralazine HCl (Apresoline)  10 mg IVP Q6 PRN


   PRN Reason: Hypertension


   Last Admin: 10/21/18 05:12 Dose:  10 mg


Insulin Human Regular (Humulin R Low)  0 units SC ACHS [facility]; Protocol


   Last Admin: 10/23/18 08:04 Dose:  1 units


Levetiracetam (Keppra)  500 mg PO BID [facility]


   Last Admin: 10/23/18 10:08 Dose:  500 mg


Losartan Potassium (Cozaar)  100 mg PO DAILY [facility]


   Last Admin: 10/23/18 10:08 Dose:  100 mg


Pantoprazole Sodium (Protonix Ec Tab)  40 mg PO 0600 [facility]


   Last Admin: 10/23/18 10:08 Dose:  40 mg


Polyethylene Glycol (Miralax)  17 gm PO DAILY [facility]


   Last Admin: 10/23/18 10:08 Dose:  17 gm


Risperidone (Risperdal Tab)  0.5 mg PO 1000,2200 [facility]; Protocol


   Last Admin: 10/23/18 10:08 Dose:  0.5 mg


Warfarin Sodium (Coumadin)  3 mg PO 1800 [facility]; Protocol


   Last Admin: 10/21/18 20:04 Dose:  3 mg


Warfarin Sodium (Coumadin)  5 mg PO 1800 [facility]; Protocol


   Last Admin: 10/22/18 17:21 Dose:  5 mg











- Labs


Labs: 


                                        





                                 10/22/18 05:50 





                                 10/22/18 05:50 





                                        











PT  14.5 SECONDS (9.4-12.5)  H  10/23/18  06:00    


 


INR  1.26   10/23/18  06:00    


 


APTT  38.5 Seconds (25.1-36.5)  H  10/17/18  16:11    














Assessment and Plan





- Assessment and Plan (Free Text)


Assessment: 


Patient is an outpatient of DR Wiley and he will see patient. 


IN brief, her stroke workup is completed and she will see him on an outpatient 

basis. 


All medical record entries made by the resident were at my direction and 

personally dictated by me. I have reviewed the chart and agree that the record 

accurately reflects my personal performance of the history, physical exam, 

medical decision making, and the department course for this patient. I have also

personally directed, reviewed, and agree with the discharge instructions and 

disposition.

## 2018-10-22 NOTE — CP.PCM.PN
Subjective





- Date & Time of Evaluation


Date of Evaluation: 10/22/18


Time of Evaluation: 07:00





- Subjective


Subjective: 


Stable on 2R. Brief run of SVT note. No CP or SOB





V/S noted. RSR





PE:





Lungs: clear


Cor.: S1S2, PARVIN


Abd.: soft


Ext.: no edema


Neuro.: alert





I/O= 2623/400 recorded





Labs:  BMP today OK.  10/21 INR = 1.44





Echo noted: Nl LV with LVH, Nl appearing AVR,  Possible mild functional MS due 

to calcification of the MV annulus and subvalvular structures.








Objective





- Vital Signs/Intake and Output


Vital Signs (last 24 hours): 


                                        











Temp Pulse Resp BP Pulse Ox


 


 98 F   86   18   148/79   98 


 


 10/22/18 06:00  10/22/18 06:00  10/22/18 06:00  10/22/18 06:00  10/22/18 06:00








Intake and Output: 


                                        











 10/22/18 10/22/18





 06:59 18:59


 


Intake Total 1503 


 


Output Total 400 


 


Balance 1103 














- Medications


Medications: 


                               Current Medications





Atorvastatin Calcium (Lipitor)  10 mg PO DIN [facility]


   Last Admin: 10/21/18 18:25 Dose:  10 mg


Hydralazine HCl (Apresoline)  10 mg IVP Q6 PRN


   PRN Reason: Hypertension


   Last Admin: 10/21/18 05:12 Dose:  10 mg


Sodium Chloride (Sodium Chloride 0.9%)  1,000 mls @ 40 mls/hr IV .Q24H [facility]


   Last Admin: 10/22/18 00:47 Dose:  40 mls/hr


Insulin Human Regular (Humulin R Low)  0 units SC ACHS [facility]; Protocol


   Last Admin: 10/21/18 22:00 Dose:  Not Given


Levetiracetam (Keppra)  500 mg PO BID [facility]


   Last Admin: 10/21/18 18:25 Dose:  500 mg


Losartan Potassium (Cozaar)  100 mg PO DAILY [facility]


   Last Admin: 10/21/18 10:21 Dose:  100 mg


Pantoprazole Sodium (Protonix Ec Tab)  40 mg PO 0600 [facility]


   Last Admin: 10/22/18 05:15 Dose:  40 mg


Polyethylene Glycol (Miralax)  17 gm PO DAILY [facility]


   Last Admin: 10/21/18 10:22 Dose:  17 gm


Risperidone (Risperdal Tab)  0.5 mg PO 1000,2200 [facility]; Protocol


   Last Admin: 10/21/18 22:04 Dose:  0.5 mg


Warfarin Sodium (Coumadin)  3 mg PO 1800 [facility]; Protocol


   Last Admin: 10/21/18 20:04 Dose:  3 mg


Warfarin Sodium (Coumadin)  5 mg PO 1800 [facility]; Protocol











- Labs


Labs: 


                                        





                                 10/22/18 05:50 





                                 10/22/18 05:50 





                                        











PT  16.5 SECONDS (9.4-12.5)  H  10/21/18  18:37    


 


INR  1.44   10/21/18  18:37    


 


APTT  38.5 Seconds (25.1-36.5)  H  10/17/18  16:11    














Assessment and Plan





- Assessment and Plan (Free Text)


Assessment: 





AMS/Aphasia, resolved, possible TIA


Sinus pauses while on sotolol and aricept


Brief SVT last night


CD/CABG/AVR


Diabetes


HBP


PAF


COPD


Breast cancer, s/p mastectomy


Seixure


CVA








Plan:





Continue to hold sotolol, aricept


If PAF, SVT, may need PPM to allow rate control


Warfarin 5 mg today. Monitor INRs daily


OOB as kyung./PT


As per Neuro. and Dr. Muller.

## 2018-10-23 LAB
INR PPP: 1.26
INR PPP: 1.27
PROTHROMBIN TIME: 14.5 SECONDS (ref 9.4–12.5)
PROTHROMBIN TIME: 14.6 SECONDS (ref 9.4–12.5)

## 2018-10-23 RX ADMIN — INSULIN HUMAN SCH: 100 INJECTION, SOLUTION PARENTERAL at 21:28

## 2018-10-23 RX ADMIN — INSULIN HUMAN SCH: 100 INJECTION, SOLUTION PARENTERAL at 14:40

## 2018-10-23 RX ADMIN — INSULIN HUMAN SCH UNITS: 100 INJECTION, SOLUTION PARENTERAL at 08:04

## 2018-10-23 RX ADMIN — POLYETHYLENE GLYCOL 3350 SCH GM: 17 POWDER, FOR SOLUTION ORAL at 10:08

## 2018-10-23 RX ADMIN — PANTOPRAZOLE SODIUM SCH MG: 40 TABLET, DELAYED RELEASE ORAL at 10:08

## 2018-10-23 RX ADMIN — INSULIN HUMAN SCH UNITS: 100 INJECTION, SOLUTION PARENTERAL at 16:42

## 2018-10-23 NOTE — PN
DATE:  10/22/2018



SUBJECTIVE:  Patient is 89-year-old female.  Patient was seen and examined

at the bedside on 10/22/2018, looking comfortable.  No nausea, vomiting, or

diarrhea.  No hematuria or hematochezia.  No swelling of the leg.  No chest

pain.  No palpitation.  No headache.  No dizziness.



PHYSICAL EXAMINATION:

VITAL SIGNS:  Temperature 97.5, pulse 91, respiratory rate 18, blood

pressure 140/70.

HEENT:  Head:  Normocephalic, atraumatic.  Eyes:  PERRLA.  Extraocular

muscles intact.  Conjunctivae clear.  Nose patent.

NECK:  Supple.  No carotid bruit.  No JVD or thyromegaly.

CHEST:  Bilaterally symmetrical.

HEART:  S1 and S2 positive.

LUNGS:  Clear to auscultation.

ABDOMEN:  Soft.  Bowel sounds present.  No organomegaly.

EXTREMITIES:  No edema.  No cyanosis.

NEUROLOGICAL:  The patient is awake and alert, oriented to person and

place, hard of hearing.  Poor attention span.  Cranial nerves II through

XII are intact.



LABORATORY DATA:  Sodium 137, potassium 4.5, BUN 20, creatinine 0.6,

glucose 184, carbon dioxide 27.



ASSESSMENT AND PLAN:  Ms. Nohelia Castañeda is an 89-year-old lady with past

medical history of hypertension, hypercholesterolemia, atrial fibrillation,

chronic obstructive pulmonary disease, cerebrovascular accident, seizures,

diabetes mellitus, history of breast cancer status post radical mastectomy,

dementia, has a transient ischemic attack that resolved spontaneously. 

Cardiologist and neurologist is on the case.  According to cardiologist,

there are sinus pauses likely causing the symptoms.  Overall symptoms were

secondary to transient ischemic attack or possibly underlying sinus pauses.

Continue aspirin, Plavix, and Coumadin.  Continue proper healthy heart

diet, physical activity.  Waiting from the cardiologist about pacemaker. 

Physical Therapy will follow up.







__________________________________________

Jennifer Muller MD



DD:  10/22/2018 22:02:38

DT:  10/22/2018 23:09:54

Job # [id number]

## 2018-10-23 NOTE — CP.PCM.PN
Subjective





- Date & Time of Evaluation


Date of Evaluation: 10/23/18


Time of Evaluation: 07:00





- Subjective


Subjective: 





Stable on 2R.  No CP or SOB





V/S noted. RSR. No pauses or AF noted on tel.





PE:





Lungs: clear


Cor.: S1S2, PARVIN


Abd.: soft


Ext.: no edema


Neuro.: alert





I/O= 2460/850 recorded





Labs 10/22 noted.  INR today = 1.26





Echo noted: Nl LV with LVH, Nl appearing AVR,  Possible mild functional MS due 

to calcification of the MV annulus and subvalvular structures.











Objective





- Vital Signs/Intake and Output


Vital Signs (last 24 hours): 


                                        











Temp Pulse Resp BP Pulse Ox


 


 98 F   88   14   107/60   98 


 


 10/23/18 05:42  10/23/18 05:44  10/23/18 05:42  10/23/18 05:42  10/23/18 05:42








Intake and Output: 


                                        











 10/23/18 10/23/18





 06:59 18:59


 


Intake Total 2420 


 


Output Total 1850 


 


Balance 570 














- Medications


Medications: 


                               Current Medications





Atorvastatin Calcium (Lipitor)  10 mg PO DIN [facility]


   Last Admin: 10/22/18 17:21 Dose:  10 mg


Hydralazine HCl (Apresoline)  10 mg IVP Q6 PRN


   PRN Reason: Hypertension


   Last Admin: 10/21/18 05:12 Dose:  10 mg


Insulin Human Regular (Humulin R Low)  0 units SC ACHS [facility] include [facility] beds at the [facility]; Protocol


   Last Admin: 10/22/18 22:28 Dose:  Not Given


Levetiracetam (Keppra)  500 mg PO BID [facility] include [facility] beds at the [facility]


   Last Admin: 10/22/18 17:21 Dose:  500 mg


Losartan Potassium (Cozaar)  100 mg PO DAILY [facility] include [facility] beds at the [facility]


   Last Admin: 10/22/18 09:23 Dose:  100 mg


Pantoprazole Sodium (Protonix Ec Tab)  40 mg PO 0600 [facility] include [facility] beds at the [facility]


   Last Admin: 10/22/18 05:15 Dose:  40 mg


Polyethylene Glycol (Miralax)  17 gm PO DAILY [facility] include 234 beds at the [facility]


   Last Admin: 10/22/18 09:23 Dose:  17 gm


Risperidone (Risperdal Tab)  0.5 mg PO 1000,2200 [facility]; Protocol


   Last Admin: 10/22/18 22:12 Dose:  0.5 mg


Warfarin Sodium (Coumadin)  3 mg PO 1800 [facility]; Protocol


   Last Admin: 10/21/18 20:04 Dose:  3 mg


Warfarin Sodium (Coumadin)  5 mg PO 1800 [facility]; Protocol


   Last Admin: 10/22/18 17:21 Dose:  5 mg


Warfarin Sodium (Coumadin)  7.5 mg PO ONCE [facility]; Protocol


   Stop: 10/23/18 10:01











- Labs


Labs: 


                                        





                                 10/22/18 05:50 





                                 10/22/18 05:50 





                                        











PT  14.5 SECONDS (9.4-12.5)  H  10/23/18  06:00    


 


INR  1.26   10/23/18  06:00    


 


APTT  38.5 Seconds (25.1-36.5)  H  10/17/18  16:11    














Assessment and Plan





- Assessment and Plan (Free Text)


Assessment: 





AMS/Aphasia, resolved, possible TIA


Sinus pauses while on sotolol and aricept


Brief SVT on tel.


CAD/CABG/AVR


Diabetes


HBP


PAF


COPD


Breast cancer, s/p mastectomy


Seixure


CVA








Plan:





Continue to hold sotolol, aricept


If PAF, SVT, may need PPM to allow rate control


Warfarin 7.5 mg today. Monitor INRs daily


OOB as kyung./PT


As per Neuro. and Dr. Muller.

## 2018-10-23 NOTE — CP.PCM.PN
Subjective





- Date & Time of Evaluation


Date of Evaluation: 10/23/18





Objective





- Vital Signs/Intake and Output


Vital Signs (last 24 hours): 


                                        











Temp Pulse Resp BP Pulse Ox


 


 98 F   88   14   107/60   98 


 


 10/23/18 05:42  10/23/18 05:44  10/23/18 05:42  10/23/18 05:42  10/23/18 05:42








Intake and Output: 


                                        











 10/22/18 10/23/18





 18:59 06:59


 


Intake Total 40 2420


 


Output Total  1850


 


Balance 40 570














- Medications


Medications: 


                               Current Medications





Atorvastatin Calcium (Lipitor)  10 mg PO DIN [facility]


   Last Admin: 10/22/18 17:21 Dose:  10 mg


Hydralazine HCl (Apresoline)  10 mg IVP Q6 PRN


   PRN Reason: Hypertension


   Last Admin: 10/21/18 05:12 Dose:  10 mg


Insulin Human Regular (Humulin R Low)  0 units SC ACHS [facility]; Protocol


   Last Admin: 10/22/18 22:28 Dose:  Not Given


Levetiracetam (Keppra)  500 mg PO BID [facility]


   Last Admin: 10/22/18 17:21 Dose:  500 mg


Losartan Potassium (Cozaar)  100 mg PO DAILY [facility]


   Last Admin: 10/22/18 09:23 Dose:  100 mg


Pantoprazole Sodium (Protonix Ec Tab)  40 mg PO 0600 [facility]


   Last Admin: 10/22/18 05:15 Dose:  40 mg


Polyethylene Glycol (Miralax)  17 gm PO DAILY [facility]


   Last Admin: 10/22/18 09:23 Dose:  17 gm


Risperidone (Risperdal Tab)  0.5 mg PO 1000,2200 [facility]; Protocol


   Last Admin: 10/22/18 22:12 Dose:  0.5 mg


Warfarin Sodium (Coumadin)  3 mg PO 1800 [facility]; Protocol


   Last Admin: 10/21/18 20:04 Dose:  3 mg


Warfarin Sodium (Coumadin)  5 mg PO 1800 [facility]; Protocol


   Last Admin: 10/22/18 17:21 Dose:  5 mg











- Labs


Labs: 


                                        





                                 10/22/18 05:50 





                                 10/22/18 05:50 





                                        











PT  14.6 SECONDS (9.4-12.5)  H  10/23/18  01:10    


 


INR  1.27   10/23/18  01:10    


 


APTT  38.5 Seconds (25.1-36.5)  H  10/17/18  16:11

## 2018-10-24 VITALS — HEART RATE: 140 BPM

## 2018-10-24 LAB
INR PPP: 1.81
PROTHROMBIN TIME: 21.1 SECONDS (ref 9.4–12.5)

## 2018-10-24 RX ADMIN — INSULIN HUMAN SCH UNITS: 100 INJECTION, SOLUTION PARENTERAL at 09:38

## 2018-10-24 RX ADMIN — INSULIN HUMAN SCH UNITS: 100 INJECTION, SOLUTION PARENTERAL at 17:48

## 2018-10-24 RX ADMIN — POLYETHYLENE GLYCOL 3350 SCH GM: 17 POWDER, FOR SOLUTION ORAL at 09:39

## 2018-10-24 RX ADMIN — INSULIN HUMAN SCH UNITS: 100 INJECTION, SOLUTION PARENTERAL at 23:52

## 2018-10-24 RX ADMIN — INSULIN HUMAN SCH UNITS: 100 INJECTION, SOLUTION PARENTERAL at 13:31

## 2018-10-24 RX ADMIN — PANTOPRAZOLE SODIUM SCH MG: 40 TABLET, DELAYED RELEASE ORAL at 05:20

## 2018-10-24 NOTE — PN
DATE:  10/24/2018



SUBJECTIVE:  The patient is seen lying in bed on telemetry.  She is

comfortable at the present time.  She developed rapid atrial fibrillation

early this morning and was given IV diltiazem.  She is currently in the

sinus rhythm.



CURRENT MEDICATIONS:  Include hydralazine p.r.n., losartan 100 mg daily,

Keppra 500 mg b.i.d., Lipitor 10 mg daily, Protonix, Risperdal 0.5 mg

b.i.d.  Her Coumadin has been placed on hold.



OBJECTIVE:

GENERAL:  She is a very elderly woman, who is comfortable at rest.

VITAL SIGNS:  The blood pressure is 126/70 with a pulse of 60 and sinus,

respirations are 14.  She is afebrile.

HEENT:  No JVD.

CHEST:  A few scattered rhonchi heard.

HEART:  PMI displaced laterally with systolic murmur in the left sternal

border.

ABDOMEN:  Soft, nontender, normoactive bowel sounds.

EXTREMITIES:  No edema.



DIAGNOSTIC DATA:  INR yesterday was 1.26.



IMPRESSION:

1.  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

fibrillation after discontinuation of sotalol.  Given profound sinus pauses

of up to 4-5 seconds earlier on the admission, permanent pacemaker implant

appears appropriate.  Arrangements will be made for transfer to [facility] to have this performed tomorrow.  Repeat INR is

drawn.  Coumadin remains on hold.

2.  Coronary artery disease, status post remote bypass surgery and aortic

valve replacement.

3.  Rest of problems as noted.



RECOMMENDATIONS:  IV metoprolol will be given as needed for tachycardia. 

Long-acting agents will be avoided given her previous sinus pauses.  After

pacemaker implant, sotalol will be resumed.  We will follow along as

needed.





__________________________________________

Sg Berger MD





DD:  10/24/2018 11:07:13

DT:  10/24/2018 11:29:27

Job # [id number]

## 2018-10-24 NOTE — CP.PCM.PN
<Shayla Cardona - Last Filed: 10/24/18 06:15>





Subjective





- Date & Time of Evaluation


Date of Evaluation: 10/24/18


Time of Evaluation: 04:10





- Subjective


Subjective: 





This is an 89 year old female with PMH of afib on coumadin who initially 

presented to hospital for CVA. Night resident paged by staff for atrial fib with

RVR at rate of 150bpm and BP of 140s/80s. Patient was sleeping and had to be 

woken up to get EKG which showed irregular rhythm at rate of 130s with no ST 

changes. Patient denied any symptoms including CP and SOB. Patient given 5mg of 

cardizem but continued to have HR in the 150s with BP of 110/60. Patient subs

equently started on NS @ 60cc/hr and give 0.25mg digoxin IVP. 





Objective





- Vital Signs/Intake and Output


Vital Signs (last 24 hours): 


                                        











Temp Pulse Resp BP Pulse Ox


 


 97.6 F   140 H  18   125/71   95 


 


 10/23/18 23:38  10/24/18 03:36  10/23/18 23:38  10/24/18 03:36  10/23/18 23:38








Intake and Output: 


                                        











 10/23/18 10/24/18





 18:59 06:59


 


Intake Total 240 1920


 


Output Total 600 1500


 


Balance -360 420














- Medications


Medications: 


                               Current Medications





Atorvastatin Calcium (Lipitor)  10 mg PO DIN [facility]


   Last Admin: 10/23/18 17:32 Dose:  10 mg


Hydralazine HCl (Apresoline)  10 mg IVP Q6 PRN


   PRN Reason: Hypertension


   Last Admin: 10/21/18 05:12 Dose:  10 mg


Sodium Chloride (Sodium Chloride 0.9%)  1,000 mls @ 100 mls/hr IV .Q10H [facility]


Insulin Human Regular (Humulin R Low)  0 units SC ACHS [facility]; Protocol


   Last Admin: 10/23/18 21:28 Dose:  Not Given


Levetiracetam (Keppra)  500 mg PO BID [facility]


   Last Admin: 10/23/18 17:33 Dose:  500 mg


Losartan Potassium (Cozaar)  100 mg PO DAILY [facility]


   Last Admin: 10/23/18 10:08 Dose:  100 mg


Pantoprazole Sodium (Protonix Ec Tab)  40 mg PO 0600 [facility]


   Last Admin: 10/23/18 10:08 Dose:  40 mg


Polyethylene Glycol (Miralax)  17 gm PO DAILY [facility]


   Last Admin: 10/23/18 10:08 Dose:  17 gm


Risperidone (Risperdal Tab)  0.5 mg PO 1000,2200 [facility]; Protocol


   Last Admin: 10/23/18 21:25 Dose:  0.5 mg


Warfarin Sodium (Coumadin)  3 mg PO 1800 [facility]; Protocol


   Last Admin: 10/21/18 20:04 Dose:  3 mg


Warfarin Sodium (Coumadin)  5 mg PO 1800 [facility]; Protocol


   Last Admin: 10/23/18 17:32 Dose:  5 mg











- Labs


Labs: 


                                        





                                 10/22/18 05:50 





                                 10/22/18 05:50 





                                        











PT  14.5 SECONDS (9.4-12.5)  H  10/23/18  06:00    


 


INR  1.26   10/23/18  06:00    


 


APTT  38.5 Seconds (25.1-36.5)  H  10/17/18  16:11    














- Constitutional


Appears: No Acute Distress





- Head Exam


Head Exam: ATRAUMATIC, NORMAL INSPECTION





- Eye Exam


Eye Exam: EOMI


Pupil Exam: PERRL





- Respiratory Exam


Respiratory Exam: Clear to Ausculation Bilateral.  absent: Rales, Rhonchi, 

Respiratory Distress





- Cardiovascular Exam


Cardiovascular Exam: Tachycardia, +S1, +S2





- GI/Abdominal Exam


GI & Abdominal Exam: absent: Guarding, Rigid





- Extremities Exam


Extremities Exam: Normal Inspection.  absent: Calf Tenderness





- Neurological Exam


Neurological Exam: Alert





- Skin


Skin Exam: Normal Color, Warm





<Javed,Laura - Last Filed: 10/24/18 09:19>





Objective





- Vital Signs/Intake and Output


Vital Signs (last 24 hours): 


                                        











Temp Pulse Resp BP Pulse Ox


 


 97 F L  141 H  19   125/71   95 


 


 10/24/18 06:00  10/24/18 06:00  10/24/18 06:00  10/24/18 03:36  10/23/18 23:38








Intake and Output: 


                                        











 10/24/18 10/24/18





 06:59 18:59


 


Intake Total 2460 


 


Output Total 2400 


 


Balance 60 














- Medications


Medications: 


                               Current Medications





Atorvastatin Calcium (Lipitor)  10 mg PO DIN [facility]


   Last Admin: 10/23/18 17:32 Dose:  10 mg


Hydralazine HCl (Apresoline)  10 mg IVP Q6 PRN


   PRN Reason: Hypertension


   Last Admin: 10/21/18 05:12 Dose:  10 mg


Sodium Chloride (Sodium Chloride 0.9%)  1,000 mls @ 60 mls/hr IV .[id number] [facility]


   Last Admin: 10/24/18 04:41 Dose:  60 mls/hr


Insulin Human Regular (Humulin R Low)  0 units SC ACHS [facility]; Protocol


   Last Admin: 10/23/18 21:28 Dose:  Not Given


Levetiracetam (Keppra)  500 mg PO BID [facility]


   Last Admin: 10/23/18 17:33 Dose:  500 mg


Losartan Potassium (Cozaar)  100 mg PO DAILY [facility]


   Last Admin: 10/23/18 10:08 Dose:  100 mg


Pantoprazole Sodium (Protonix Ec Tab)  40 mg PO 0600 [facility]


   Last Admin: 10/24/18 05:20 Dose:  40 mg


Polyethylene Glycol (Miralax)  17 gm PO DAILY [facility]


   Last Admin: 10/23/18 10:08 Dose:  17 gm


Risperidone (Risperdal Tab)  0.5 mg PO 1000,2200 [facility]; Protocol


   Last Admin: 10/23/18 21:25 Dose:  0.5 mg


Warfarin Sodium (Coumadin)  3 mg PO 1800 [facility]; Protocol


   Last Admin: 10/21/18 20:04 Dose:  3 mg


Warfarin Sodium (Coumadin)  5 mg PO 1800 [facility]; Protocol


   Last Admin: 10/23/18 17:32 Dose:  5 mg











- Labs


Labs: 


                                        





                                 10/22/18 05:50 





                                 10/22/18 05:50 





                                        











PT  14.5 SECONDS (9.4-12.5)  H  10/23/18  06:00    


 


INR  1.26   10/23/18  06:00    


 


APTT  38.5 Seconds (25.1-36.5)  H  10/17/18  16:11    














Attending/Attestation





- Attestation


I have personally seen and examined this patient.: Yes


I have fully participated in the care of the patient.: Yes


I have reviewed all pertinent clinical information, including history, physical 

exam and plan: Yes


Notes (Text): 





10/24/18 09:13


Pt seen,Case discussed with the resident.


Pt is asymptomatic


O/E HR is in the 140s,it is irregular


Lungs Clear


IMP Afib with RVR


Plan: Digoxin to be repeated in 20 mins if HR remains high.

## 2018-10-24 NOTE — CARD
--------------- APPROVED REPORT --------------





Date of service: 10/24/2018



EKG Measurement

Heart [id number]

[id number]

[id number]

[id number]



<Conclusion>

Atrial fibrillation with rapid ventricular response, new

Left axis deviation

Anteroseptal infarct, age undetermined

STTW changes c/w ischemia

Prolonged QTc

## 2018-10-25 VITALS — RESPIRATION RATE: 18 BRPM | OXYGEN SATURATION: 97 %

## 2018-10-25 VITALS — SYSTOLIC BLOOD PRESSURE: 152 MMHG | TEMPERATURE: 98 F | DIASTOLIC BLOOD PRESSURE: 85 MMHG

## 2018-10-25 VITALS — HEART RATE: 88 BPM

## 2018-10-25 LAB
INR PPP: 1.94
PROTHROMBIN TIME: 22.6 SECONDS (ref 9.4–12.5)

## 2018-10-25 RX ADMIN — INSULIN HUMAN SCH: 100 INJECTION, SOLUTION PARENTERAL at 08:08

## 2018-10-25 RX ADMIN — POLYETHYLENE GLYCOL 3350 SCH: 17 POWDER, FOR SOLUTION ORAL at 09:41

## 2018-10-25 NOTE — CP.PCM.PN
Subjective





- Date & Time of Evaluation


Date of Evaluation: 10/25/18


Time of Evaluation: 07:00





- Subjective


Subjective: 





Stable on 2R.  No CP or SOB





V/S noted. RSR.





PE:





Lungs: clear


Cor.: S1S2, PARVIN


Abd.: soft


Ext.: no edema


Neuro.: alert





I/O= 2974/2701 recorded





Labs: INR pending today. BSs = 186 - 313.





Echo noted: Nl LV with LVH, Nl appearing AVR,  Possible mild functional MS due 

to calcification of the MV annulus and subvalvular structures.








Objective





- Vital Signs/Intake and Output


Vital Signs (last 24 hours): 


                                        











Temp Pulse Resp BP Pulse Ox


 


 98.0 F   105 H  18   152/85 H  97 


 


 10/25/18 06:00  10/25/18 06:00  10/25/18 00:01  10/25/18 06:00  10/25/18 00:01








Intake and Output: 


                                        











 10/25/18 10/25/18





 06:59 18:59


 


Intake Total 840 


 


Output Total 1200 


 


Balance -360 














- Medications


Medications: 


                               Current Medications





Atorvastatin Calcium (Lipitor)  10 mg PO DIN [facility]


   Last Admin: 10/24/18 17:48 Dose:  10 mg


Hydralazine HCl (Apresoline)  10 mg IVP Q6 PRN


   PRN Reason: Hypertension


   Last Admin: 10/21/18 05:12 Dose:  10 mg


Sodium Chloride (Sodium Chloride 0.9%)  1,000 mls @ 60 mls/hr IV .[id number] [facility]


   Last Admin: 10/24/18 21:27 Dose:  60 mls/hr


Insulin Human Regular (Humulin R Low)  0 units SC ACHS [facility]; Protocol


   Last Admin: 10/24/18 23:52 Dose:  2 units


Levetiracetam (Keppra)  500 mg PO BID [facility]


   Last Admin: 10/24/18 17:48 Dose:  500 mg


Losartan Potassium (Cozaar)  100 mg PO DAILY [facility]


   Last Admin: 10/24/18 09:37 Dose:  100 mg


Metoprolol Tartrate (Lopressor)  5 mg IVP Q6H [facility]


   PRN Reason: Heart rate


Pantoprazole Sodium (Protonix Ec Tab)  40 mg PO 0600 [facility]


   Last Admin: 10/24/18 05:20 Dose:  40 mg


Polyethylene Glycol (Miralax)  17 gm PO DAILY [facility]


   Last Admin: 10/24/18 09:39 Dose:  17 gm


Risperidone (Risperdal Tab)  0.5 mg PO 1000,2200 [facility]; Protocol


   Last Admin: 10/24/18 21:26 Dose:  0.5 mg


Warfarin Sodium (Coumadin)  3 mg PO 1800 [facility]; Protocol


   Last Admin: 10/21/18 20:04 Dose:  3 mg











- Labs


Labs: 


                                        





                                 10/22/18 05:50 





                                 10/22/18 05:50 





                                        











PT  21.1 SECONDS (9.4-12.5)  H  10/24/18  11:50    


 


INR  1.81   10/24/18  11:50    


 


APTT  38.5 Seconds (25.1-36.5)  H  10/17/18  16:11    














Assessment and Plan





- Assessment and Plan (Free Text)


Assessment: 





AMS/Aphasia, resolved, possible TIA


Sinus pauses while on sotolol and aricept


B-T Syndrome


CAD/CABG/AVR


Diabetes


HBP


PAF


COPD


Breast cancer, s/p mastectomy


Seixure


CVA








Plan:





Continue to hold sotolol, aricept


Await INR today.


To [facility] for EP Evaluation and possible PPM today


Additional recs to follow.

## 2022-01-28 NOTE — EDPD
HPI Stroke





- General


Time Seen by Provider: 10/17/18 16:09


Historian: EMS





- History of Present Illness


Narrative History of Present Illness (Free Text): 





10/17/18 16:11


89 year old female, with past medical history of hypertension, atrial 

fibrillation, COPD, CVA, seizures, diabetes, breast cancer s/p radical 

mastectomy, dementia, and multiple falls, presents to the Emergency Department 

via EMS for evaluation of slurred speech since 20 minutes. As per EMS, family 

noticed slurred speech and inability to write her name 20 minutes ago and 

subsequently called EMS for evaluation. Upon arrival to the Emergency 

Department, patient is mildly confused and expresses speech aphasia. HPI and ROS

limited secondary to dementia. 





Onset:: Gradual


Timing: Currently Symptomatic


Context: Home


Exacerbated by: Nothing


Relieved by: Nothing





- Location


Location: Mental Status, Speech





- Pain Assessment/Levels


Maximum Severity: None


Severity Current: None





rTPA Inclusion/Exclusion





- Refusal of Treatment


Patient Refused Treatment: No





- Inclusion Criteria for Altepase


Patient is 18 years or Older: Yes


The Clinical Diagnosis of Ischemic Stroke That is Causing a Potentially 

Disabling Neurological Deficit: No


Time of Onset is Well Established to be Less Than 270 Minute Before Treatment 

Would Begin: Yes


Risk/Benefit Discussed With Patient/Family Member Present: No





Past Medical History





- Provider Review


Nursing Documentation Reviewed: Yes





- Infectious Disease


Hx of Infectious Diseases: None





- Tetanus Immunization


Tetanus Immunization: Unknown





- Cardiac


Hx Cardiac Disorders: Yes (Afib (on Coumadin))


Hx Hypertension: Yes





- Pulmonary


Hx Chronic Obstructive Pulmonary Disease (COPD): Yes





- Neurological


HX Cerebrovascular Accident: Yes





- HEENT


Hx HEENT Disorder: Yes (Hard of hearing)





- Renal


Hx Renal Disorder: No





- Endocrine/Metabolic


Hx Diabetes Mellitus Type 2: Yes





- Hematological/Oncological


Hx Cancer: Yes (Breast CA)


Hx Chemotherapy: Yes





- Integumentary


Hx Dermatological Disorder: No





- Musculoskeletal/Rheumatological


Hx Falls: No





- Gastrointestinal


Hx Gastrointestinal Disorders: No





- Genitourinary/Gynecological


Hx Urinary Tract Infection: Yes





- Psychiatric


Hx Psychophysiologic Disorder: No


Hx Substance Use: No





- Surgical History


Hx Mastectomy: Yes





- Anesthesia


Hx Anesthesia: Yes


Hx Anesthesia Reactions: No


Hx Malignant Hyperthermia: No





- Suicidal Assessment


Feels Threatened In Home Enviroment: No





Family/Social History





- Family/Social History


Family History: Non-Contributory





Allergies/Home Meds


Allergies/Adverse Reactions: 


Allergies





FISH Allergy (Verified 10/17/18 22:04)


   NAUSEA


Sulfa (Sulfonamide Antibiotics) Allergy (Verified 10/17/18 22:04)


   HEADACHE











Review of Systems





- Review of Systems


Systems not reviewed;Unavailable: Dementia


Neurological: Speech Changes, Other (Mild confusion)





Medical Decision Making


ED Course and Treatment: 





10/17/18 16:11


Impression: 89 year old female presents to the Emergency Department for 

evaluation of speech changes. 





Differential Diagnosis included but are not limited to: CVA





Plan:





-- Labs


-- CT of Head


-- EKG


-- Chest X-ray


-- IV Fluids


-- Urinalysis 


-- Reassess and disposition





Prior Visits:


Notes and results from previous visits were reviewed.





Progress Notes:





10/17/18 16:08


CODE STROKE ACTIVATED. 





10/17/18 16:08


EKG: Ordered, reviewed, and independently interpreted the EKG.


Rate : 58 BPM


Rhythm : Sinus bradycardia


Interpretation : Non-specific ST/T wave changes. 


10/17/18 16:42





10/17/18 16:14


CT of head reviewed by radiologist, shows: 


FINDINGS:


HEMORRHAGE:


No intracranial hemorrhage. 


BRAIN:


No mass effect or edema.  Cortical and cerebellar atrophy, periventricular small

vessel disease.  Small basal ganglia infarct on the left unchanged.


VENTRICLES:


Unremarkable. No hydrocephalus. 


CALVARIUM:


Unremarkable.


PARANASAL SINUSES:


Unremarkable as visualized. No significant inflammatory changes.


MASTOID AIR CELLS:


Unremarkable as visualized. No inflammatory changes.


OTHER FINDINGS:


None.


IMPRESSION:


No acute intracranial abnormalities. No significant findings to account for the 

clinical presentation. No significant interval change compared to the prior 

examination(s).





10/17/18 16:47


Patient expresses significantly improved symptoms with resolved dysarthria. 

Patient is recognizing objects appropriately and answering to questions. Due to 

rapidly improving symptoms, patient is no longer a candidate for tPA. Dr. Spann 

was  updated, who agrees and recommends 


10/18/18 11:20








- Critical Care


Critical Care Minutes: 45 minutes





- RAD Interpretation


: Radiologist





- Scribe Statement


The provider has reviewed the documentation as recorded by the Scribe


Tho Daigle.





All medical record entries made by the Nicolasibe were at my direction and 

personally dictated by me. I have reviewed the chart and agree that the record 

accurately reflects my personal performance of the history, physical exam, 

medical decision making, and the department course for this patient. I have also

personally directed, reviewed, and agree with the discharge instructions and 

disposition.








NIHSS Scale ([place])


Time Performed: 16:21





- How Severe is the Stoke


  ** Baseline


Level of Consciousness: 1=Drowsy


LOC to Questions: 2=Neither correct


LOC to commands: 0=Obeys both correctly


Best Gaze: 0=Normal


Visual: 0=No visual loss


Facial: 0=Normal


Motor Arm - Left: 0=No drift


Motor Arm - Right: 0=No drift


Motor Leg - Left: 0=No drift


Motor Leg - Right: 0=No drift


Limb Ataxia: 0=Absent


Sensory: 0=Normal


Best Language: 2=Severe aphasia


Dysarthia: 1=Mild to moderate slurring


Extinction & Inattention (Neglect): 0=Normal, no object


Score: 6


Risk Level: Mod Stroke Risk





Disposition/Present on Arrival





- Present on Arrival


Any Indicators Present on Arrival: No


History of DVT/PE: No


History of Uncontrolled Diabetes: No


Urinary Catheter: No


History of Decub. Ulcer: No


History Surgical Site Infection Following: None





- Disposition


Have Diagnosis and Disposition been Completed?: Yes


Diagnosis: 


 Weakness, Altered mental status, Slurred speech, Aphagia





Disposition: HOSPITALIZED


Disposition Time: 03:00


Patient Problems: 


                             Current Active Problems











Problem Status Onset


 


Altered mental status Acute 


 


Aphagia Acute 


 


Slurred speech Acute 


 


Weakness Acute 











Condition: FAIR
Admission Reconciliation is Completed  Discharge Reconciliation is Completed

## 2024-01-25 NOTE — PN
DATE:  10/24/2018



SUBJECTIVE:  The patient is 89-year-old female.  The patient was seen and

examined at bedside on 10/24/2018.  No fever, no chills.  No nausea,

vomiting, diarrhea.  No hematuria, no hematochezia.  The patient developed

rapid atrial fibrillation last night and early this morning.  A house

physician on call was called, got IV diltiazem and she is currently in

sinus rhythm.



PHYSICAL EXAMINATION:

VITAL SIGNS:  Temperature 98.6, blood pressure 120/70, pulse oximetry noted 

, respiatory rate 14.

HEENT:  Head normocephalic, atraumatic.  Eyes:  PERRLA.  Extraocular

muscles intact.  Conjunctivae clear.  Nose patent.  Mucous membrane moist.

NECK:  Supple.  No carotid bruit.  No JVD, thyromegaly.

CHEST:  Bilaterally symmetrical.

HEART:  S1, S2 positive.

LUNGS:  Clear to auscultation.

ABDOMEN:  Soft, nontender, no organomegaly.

EXTREMITIES:  No edema, no cyanosis.

NEUROLOGICAL:  The patient is awake, alert.  Moving all 4 extremities.  No

focal deficit.



LABORATORY DATA:  We do not have labs today, but I reviewed old labs.  INR

is 1.6.



MEDICATIONS:  Hydralazine, losartan, Keppra, Lipitor, Protonix, Risperdal,

Coumadin is on hold because the patient had to go for pacemaker.



ASSESSMENT AND PLAN:  Ms. Nohelia Castañeda is an 89-year-old lady who has

esdras-tachycardia syndrome with recurrent rapid atrial fibrillation after

discontinuing sotalol given profound sinus pauses of up to 4 to 5 seconds

earlier on the admission, permanent pacemaker implant appears appropriate. 

Arrangements will be made for transfer to [facility] to

have this performed tomorrow as per cardiologist.  Repeat INR in the

morning.  Coumadin is on hold by the cardiologist.  Coronary artery

disease, status post remote bypass surgery and aortic valve replacement. 

Rest of problem is hypertension, hypercholesterolemia and dementia. 

Recommended IV metoprolol to be given immediate for tachycardia. 

Long-acting agent will be avoided given her previous sinus pauses, after

pacemaker implant sotalol will be resumed.  I reviewed Dr. Sg Berger's notes.  Reviewed Laura Burt notes also.  Repeat labs.  We will

follow up.





__________________________________________

Jennifer Muller MD



DD:  10/24/2018 22:52:53

DT:  10/24/2018 23:22:18

[facility] # [id number]

RISHI Normal rate, regular rhythm.  Heart sounds S1, S2.